# Patient Record
Sex: MALE | Race: BLACK OR AFRICAN AMERICAN | Employment: OTHER | URBAN - METROPOLITAN AREA
[De-identification: names, ages, dates, MRNs, and addresses within clinical notes are randomized per-mention and may not be internally consistent; named-entity substitution may affect disease eponyms.]

---

## 2017-02-08 PROCEDURE — 86255 FLUORESCENT ANTIBODY SCREEN: CPT | Performed by: OTHER

## 2017-02-08 PROCEDURE — 82607 VITAMIN B-12: CPT | Performed by: OTHER

## 2017-02-08 PROCEDURE — 84238 ASSAY NONENDOCRINE RECEPTOR: CPT | Performed by: OTHER

## 2017-02-08 PROCEDURE — 83516 IMMUNOASSAY NONANTIBODY: CPT | Performed by: OTHER

## 2017-02-08 PROCEDURE — 86038 ANTINUCLEAR ANTIBODIES: CPT | Performed by: OTHER

## 2017-02-08 PROCEDURE — 83519 RIA NONANTIBODY: CPT | Performed by: OTHER

## 2017-02-08 PROCEDURE — 82746 ASSAY OF FOLIC ACID SERUM: CPT | Performed by: OTHER

## 2017-02-08 PROCEDURE — 82085 ASSAY OF ALDOLASE: CPT | Performed by: OTHER

## 2017-05-02 PROBLEM — M65.331 TRIGGER FINGER, RIGHT MIDDLE FINGER: Status: ACTIVE | Noted: 2017-05-02

## 2017-07-25 ENCOUNTER — HOSPITAL ENCOUNTER (EMERGENCY)
Facility: HOSPITAL | Age: 62
Discharge: HOME | End: 2017-07-25
Attending: EMERGENCY MEDICINE
Payer: MEDICARE

## 2017-07-25 ENCOUNTER — APPOINTMENT (OUTPATIENT)
Dept: ULTRASOUND IMAGING | Facility: HOSPITAL | Age: 62
End: 2017-07-25
Attending: EMERGENCY MEDICINE
Payer: MEDICARE

## 2017-07-25 ENCOUNTER — APPOINTMENT (OUTPATIENT)
Dept: NUCLEAR MEDICINE | Facility: HOSPITAL | Age: 62
End: 2017-07-25
Attending: EMERGENCY MEDICINE
Payer: MEDICARE

## 2017-07-25 ENCOUNTER — APPOINTMENT (OUTPATIENT)
Dept: GENERAL RADIOLOGY | Facility: HOSPITAL | Age: 62
End: 2017-07-25
Attending: EMERGENCY MEDICINE
Payer: MEDICARE

## 2017-07-25 VITALS
RESPIRATION RATE: 18 BRPM | HEART RATE: 60 BPM | BODY MASS INDEX: 23.82 KG/M2 | HEIGHT: 65 IN | WEIGHT: 143 LBS | DIASTOLIC BLOOD PRESSURE: 52 MMHG | SYSTOLIC BLOOD PRESSURE: 146 MMHG | OXYGEN SATURATION: 100 % | TEMPERATURE: 97 F

## 2017-07-25 DIAGNOSIS — R20.2 ARM PARESTHESIA, LEFT: ICD-10-CM

## 2017-07-25 DIAGNOSIS — N28.9 RENAL INSUFFICIENCY: ICD-10-CM

## 2017-07-25 DIAGNOSIS — E86.0 DEHYDRATION: Primary | ICD-10-CM

## 2017-07-25 LAB
ANION GAP SERPL CALC-SCNC: 11 MMOL/L (ref 0–18)
BASOPHILS # BLD: 0 K/UL (ref 0–0.2)
BASOPHILS NFR BLD: 1 %
BNP SERPL-MCNC: 20 PG/ML (ref 0–100)
BUN SERPL-MCNC: 31 MG/DL (ref 8–20)
BUN/CREAT SERPL: 13 (ref 10–20)
CALCIUM SERPL-MCNC: 9.1 MG/DL (ref 8.5–10.5)
CHLORIDE SERPL-SCNC: 105 MMOL/L (ref 95–110)
CO2 SERPL-SCNC: 17 MMOL/L (ref 22–32)
CREAT SERPL-MCNC: 2.38 MG/DL (ref 0.5–1.5)
EOSINOPHIL # BLD: 0.1 K/UL (ref 0–0.7)
EOSINOPHIL NFR BLD: 1 %
ERYTHROCYTE [DISTWIDTH] IN BLOOD BY AUTOMATED COUNT: 18.6 % (ref 11–15)
GLUCOSE SERPL-MCNC: 100 MG/DL (ref 70–99)
HCT VFR BLD AUTO: 35.4 % (ref 41–52)
HGB BLD-MCNC: 11.3 G/DL (ref 13.5–17.5)
LYMPHOCYTES # BLD: 1.3 K/UL (ref 1–4)
LYMPHOCYTES NFR BLD: 18 %
MCH RBC QN AUTO: 24.3 PG (ref 27–32)
MCHC RBC AUTO-ENTMCNC: 31.9 G/DL (ref 32–37)
MCV RBC AUTO: 76.2 FL (ref 80–100)
MONOCYTES # BLD: 0.6 K/UL (ref 0–1)
MONOCYTES NFR BLD: 9 %
NEUTROPHILS # BLD AUTO: 5.2 K/UL (ref 1.8–7.7)
NEUTROPHILS NFR BLD: 71 %
OSMOLALITY UR CALC.SUM OF ELEC: 283 MOSM/KG (ref 275–295)
PLATELET # BLD AUTO: 305 K/UL (ref 140–400)
PMV BLD AUTO: 8.5 FL (ref 7.4–10.3)
POTASSIUM SERPL-SCNC: 4 MMOL/L (ref 3.3–5.1)
RBC # BLD AUTO: 4.65 M/UL (ref 4.5–5.9)
SODIUM SERPL-SCNC: 133 MMOL/L (ref 136–144)
TROPONIN I SERPL-MCNC: 0.01 NG/ML (ref ?–0.03)
WBC # BLD AUTO: 7.3 K/UL (ref 4–11)

## 2017-07-25 PROCEDURE — 83880 ASSAY OF NATRIURETIC PEPTIDE: CPT | Performed by: EMERGENCY MEDICINE

## 2017-07-25 PROCEDURE — 71010 XR CHEST AP PORTABLE  (CPT=71010): CPT | Performed by: EMERGENCY MEDICINE

## 2017-07-25 PROCEDURE — 93971 EXTREMITY STUDY: CPT | Performed by: EMERGENCY MEDICINE

## 2017-07-25 PROCEDURE — 93010 ELECTROCARDIOGRAM REPORT: CPT | Performed by: EMERGENCY MEDICINE

## 2017-07-25 PROCEDURE — 99285 EMERGENCY DEPT VISIT HI MDM: CPT

## 2017-07-25 PROCEDURE — 96361 HYDRATE IV INFUSION ADD-ON: CPT

## 2017-07-25 PROCEDURE — 93005 ELECTROCARDIOGRAM TRACING: CPT

## 2017-07-25 PROCEDURE — 96374 THER/PROPH/DIAG INJ IV PUSH: CPT

## 2017-07-25 PROCEDURE — 80048 BASIC METABOLIC PNL TOTAL CA: CPT | Performed by: EMERGENCY MEDICINE

## 2017-07-25 PROCEDURE — 85025 COMPLETE CBC W/AUTO DIFF WBC: CPT | Performed by: EMERGENCY MEDICINE

## 2017-07-25 PROCEDURE — 78582 LUNG VENTILAT&PERFUS IMAGING: CPT | Performed by: EMERGENCY MEDICINE

## 2017-07-25 PROCEDURE — 84484 ASSAY OF TROPONIN QUANT: CPT | Performed by: EMERGENCY MEDICINE

## 2017-07-25 RX ORDER — MORPHINE SULFATE 4 MG/ML
4 INJECTION, SOLUTION INTRAMUSCULAR; INTRAVENOUS ONCE
Status: COMPLETED | OUTPATIENT
Start: 2017-07-25 | End: 2017-07-25

## 2017-07-25 NOTE — ED PROVIDER NOTES
Patient Seen in: [facility] Emergency Department    History   Patient presents with:  Dyspnea STAN SOB (respiratory)    Stated Complaint: sob    HPI    62year old male with past medical history of CAD s/p stent, high cholesterol, history of spine 10/14/15: OTHER SURGICAL HISTORY Right      Comment: right arthrotomy, debridement, synovectomy of                the joints w/biopsy right index and long finger               MP joint  10/14/2015: PATIENT DOCUMENTED NOT TO HAVE EXPERIENCED ANY* Right Bisoprolol Fumarate (ZEBETA) 5 MG Oral Tab,  Take 5 mg by mouth daily. Levothyroxine Sodium (SYNTHROID, LEVOTHROID) 25 MCG Oral Tab,  Take 25 mcg by mouth before breakfast.   clopidogrel (PLAVIX) 75 MG Oral Tab,  Take 75 mg by mouth daily.    FLUTICASONE Abdominal: Soft. He exhibits no distension. There is no guarding. Musculoskeletal: Normal range of motion. Left upper arm: He exhibits tenderness and swelling (mild compared to right, compartments soft). He exhibits no edema and no deformity. Rate: 62  Rhythm: Sinus Rhythm  Reading: NSR           ============================================================  ED Course  ------------------------------------------------------------  MDM     Pulse Ox: 100%, Normal, RA    Cardiac Monitor: Pulse Readi

## 2017-07-25 NOTE — ED INITIAL ASSESSMENT (HPI)
Patient c/o sob pta, July 6th was here for a gastric stent her patient and a left arm blood clot, contunues to have numbness to the left arm, denies chest pain or recent sickness

## 2017-09-13 PROBLEM — G90.512 COMPLEX REGIONAL PAIN SYNDROME TYPE 1 OF LEFT UPPER EXTREMITY: Status: ACTIVE | Noted: 2017-09-13

## 2017-09-29 PROBLEM — M79.602 LEFT ARM PAIN: Status: ACTIVE | Noted: 2017-09-29

## 2018-03-20 ENCOUNTER — APPOINTMENT (OUTPATIENT)
Dept: OCCUPATIONAL MEDICINE | Facility: HOSPITAL | Age: 63
End: 2018-03-20
Attending: PAIN MEDICINE
Payer: MEDICARE

## 2018-03-22 ENCOUNTER — APPOINTMENT (OUTPATIENT)
Dept: OCCUPATIONAL MEDICINE | Facility: HOSPITAL | Age: 63
End: 2018-03-22
Attending: PAIN MEDICINE
Payer: MEDICARE

## 2018-03-27 ENCOUNTER — TELEPHONE (OUTPATIENT)
Dept: OCCUPATIONAL MEDICINE | Facility: HOSPITAL | Age: 63
End: 2018-03-27

## 2018-03-29 ENCOUNTER — APPOINTMENT (OUTPATIENT)
Dept: OCCUPATIONAL MEDICINE | Facility: HOSPITAL | Age: 63
End: 2018-03-29
Attending: PAIN MEDICINE
Payer: MEDICARE

## 2018-04-02 ENCOUNTER — TELEPHONE (OUTPATIENT)
Dept: PHYSICAL THERAPY | Facility: HOSPITAL | Age: 63
End: 2018-04-02

## 2018-04-03 ENCOUNTER — APPOINTMENT (OUTPATIENT)
Dept: OCCUPATIONAL MEDICINE | Facility: HOSPITAL | Age: 63
End: 2018-04-03
Attending: PAIN MEDICINE
Payer: MEDICARE

## 2018-04-05 ENCOUNTER — TELEPHONE (OUTPATIENT)
Dept: OCCUPATIONAL MEDICINE | Facility: HOSPITAL | Age: 63
End: 2018-04-05

## 2018-04-11 ENCOUNTER — APPOINTMENT (OUTPATIENT)
Dept: OCCUPATIONAL MEDICINE | Facility: HOSPITAL | Age: 63
End: 2018-04-11
Attending: PAIN MEDICINE
Payer: MEDICARE

## 2018-04-13 ENCOUNTER — APPOINTMENT (OUTPATIENT)
Dept: OCCUPATIONAL MEDICINE | Facility: HOSPITAL | Age: 63
End: 2018-04-13
Attending: PAIN MEDICINE
Payer: MEDICARE

## 2018-04-17 ENCOUNTER — APPOINTMENT (OUTPATIENT)
Dept: OCCUPATIONAL MEDICINE | Facility: HOSPITAL | Age: 63
End: 2018-04-17
Attending: PAIN MEDICINE
Payer: MEDICARE

## 2018-04-19 ENCOUNTER — APPOINTMENT (OUTPATIENT)
Dept: OCCUPATIONAL MEDICINE | Facility: HOSPITAL | Age: 63
End: 2018-04-19
Attending: PAIN MEDICINE
Payer: MEDICARE

## 2018-04-24 ENCOUNTER — APPOINTMENT (OUTPATIENT)
Dept: OCCUPATIONAL MEDICINE | Facility: HOSPITAL | Age: 63
End: 2018-04-24
Attending: PAIN MEDICINE
Payer: MEDICARE

## 2018-04-26 ENCOUNTER — APPOINTMENT (OUTPATIENT)
Dept: OCCUPATIONAL MEDICINE | Facility: HOSPITAL | Age: 63
End: 2018-04-26
Attending: PAIN MEDICINE
Payer: MEDICARE

## 2021-09-23 ENCOUNTER — APPOINTMENT (OUTPATIENT)
Dept: CT IMAGING | Facility: HOSPITAL | Age: 66
End: 2021-09-23
Attending: EMERGENCY MEDICINE
Payer: MEDICARE

## 2021-09-23 ENCOUNTER — HOSPITAL ENCOUNTER (OUTPATIENT)
Facility: HOSPITAL | Age: 66
Setting detail: OBSERVATION
LOS: 1 days | Discharge: HOME | End: 2021-09-24
Attending: EMERGENCY MEDICINE | Admitting: HOSPITALIST
Payer: MEDICARE

## 2021-09-23 DIAGNOSIS — N17.9 AKI (ACUTE KIDNEY INJURY) (HCC): Primary | ICD-10-CM

## 2021-09-23 DIAGNOSIS — R51.9 ACUTE NONINTRACTABLE HEADACHE, UNSPECIFIED HEADACHE TYPE: ICD-10-CM

## 2021-09-23 LAB
ANION GAP SERPL CALC-SCNC: 11 MMOL/L (ref 0–18)
BASOPHILS # BLD AUTO: 0.03 X10(3) UL (ref 0–0.2)
BASOPHILS NFR BLD AUTO: 0.4 %
BUN BLD-MCNC: 49 MG/DL (ref 7–18)
BUN/CREAT SERPL: 12.9 (ref 10–20)
CALCIUM BLD-MCNC: 9 MG/DL (ref 8.5–10.1)
CHLORIDE SERPL-SCNC: 103 MMOL/L (ref 98–112)
CO2 SERPL-SCNC: 19 MMOL/L (ref 21–32)
CREAT BLD-MCNC: 3.81 MG/DL
CRP SERPL-MCNC: 0.5 MG/DL (ref ?–0.3)
DEPRECATED RDW RBC AUTO: 42.9 FL (ref 35.1–46.3)
EOSINOPHIL # BLD AUTO: 0.03 X10(3) UL (ref 0–0.7)
EOSINOPHIL NFR BLD AUTO: 0.4 %
ERYTHROCYTE [DISTWIDTH] IN BLOOD BY AUTOMATED COUNT: 15.6 % (ref 11–15)
ERYTHROCYTE [SEDIMENTATION RATE] IN BLOOD: 53 MM/HR
GLUCOSE BLD-MCNC: 152 MG/DL (ref 70–99)
HCT VFR BLD AUTO: 40.4 %
HGB BLD-MCNC: 13.2 G/DL
IMM GRANULOCYTES # BLD AUTO: 0.25 X10(3) UL (ref 0–1)
IMM GRANULOCYTES NFR BLD: 3 %
LYMPHOCYTES # BLD AUTO: 1.58 X10(3) UL (ref 1–4)
LYMPHOCYTES NFR BLD AUTO: 19 %
MCH RBC QN AUTO: 25.2 PG (ref 26–34)
MCHC RBC AUTO-ENTMCNC: 32.7 G/DL (ref 31–37)
MCV RBC AUTO: 77.1 FL
MONOCYTES # BLD AUTO: 0.26 X10(3) UL (ref 0.1–1)
MONOCYTES NFR BLD AUTO: 3.1 %
NEUTROPHILS # BLD AUTO: 6.17 X10 (3) UL (ref 1.5–7.7)
NEUTROPHILS # BLD AUTO: 6.17 X10(3) UL (ref 1.5–7.7)
NEUTROPHILS NFR BLD AUTO: 74.1 %
OSMOLALITY SERPL CALC.SUM OF ELEC: 292 MOSM/KG (ref 275–295)
PLATELET # BLD AUTO: 274 10(3)UL (ref 150–450)
POTASSIUM SERPL-SCNC: 3.9 MMOL/L (ref 3.5–5.1)
RBC # BLD AUTO: 5.24 X10(6)UL
S PYO AG THROAT QL: NEGATIVE
SARS-COV-2 RNA RESP QL NAA+PROBE: NOT DETECTED
SODIUM SERPL-SCNC: 133 MMOL/L (ref 136–145)
WBC # BLD AUTO: 8.3 X10(3) UL (ref 4–11)

## 2021-09-23 PROCEDURE — 99223 1ST HOSP IP/OBS HIGH 75: CPT | Performed by: INTERNAL MEDICINE

## 2021-09-23 PROCEDURE — 70490 CT SOFT TISSUE NECK W/O DYE: CPT | Performed by: EMERGENCY MEDICINE

## 2021-09-23 PROCEDURE — 99220 INITIAL OBSERVATION CARE,LEVL III: CPT | Performed by: HOSPITALIST

## 2021-09-23 PROCEDURE — 70450 CT HEAD/BRAIN W/O DYE: CPT | Performed by: EMERGENCY MEDICINE

## 2021-09-23 RX ORDER — METOPROLOL TARTRATE 50 MG/1
50 TABLET, FILM COATED ORAL
Status: DISCONTINUED | OUTPATIENT
Start: 2021-09-23 | End: 2021-09-24

## 2021-09-23 RX ORDER — AMLODIPINE BESYLATE 5 MG/1
5 TABLET ORAL DAILY
Status: DISCONTINUED | OUTPATIENT
Start: 2021-09-24 | End: 2021-09-24

## 2021-09-23 RX ORDER — HYDROCODONE BITARTRATE AND ACETAMINOPHEN 5; 325 MG/1; MG/1
1 TABLET ORAL EVERY 6 HOURS PRN
COMMUNITY

## 2021-09-23 RX ORDER — SODIUM CHLORIDE 9 MG/ML
INJECTION, SOLUTION INTRAVENOUS CONTINUOUS
Status: DISCONTINUED | OUTPATIENT
Start: 2021-09-23 | End: 2021-09-24

## 2021-09-23 RX ORDER — ALENDRONATE SODIUM 70 MG/1
70 TABLET ORAL WEEKLY
COMMUNITY

## 2021-09-23 RX ORDER — METOCLOPRAMIDE HYDROCHLORIDE 5 MG/ML
5 INJECTION INTRAMUSCULAR; INTRAVENOUS EVERY 8 HOURS PRN
Status: DISCONTINUED | OUTPATIENT
Start: 2021-09-23 | End: 2021-09-24

## 2021-09-23 RX ORDER — SODIUM CHLORIDE 9 MG/ML
INJECTION, SOLUTION INTRAVENOUS CONTINUOUS
Status: DISCONTINUED | OUTPATIENT
Start: 2021-09-23 | End: 2021-09-23

## 2021-09-23 RX ORDER — ACETAMINOPHEN 325 MG/1
650 TABLET ORAL EVERY 6 HOURS PRN
Status: DISCONTINUED | OUTPATIENT
Start: 2021-09-23 | End: 2021-09-24

## 2021-09-23 RX ORDER — NIFEDIPINE 30 MG/1
60 TABLET, EXTENDED RELEASE ORAL 2 TIMES DAILY
Status: DISCONTINUED | OUTPATIENT
Start: 2021-09-23 | End: 2021-09-24

## 2021-09-23 RX ORDER — DIPHENHYDRAMINE HYDROCHLORIDE 50 MG/ML
25 INJECTION INTRAMUSCULAR; INTRAVENOUS ONCE
Status: COMPLETED | OUTPATIENT
Start: 2021-09-23 | End: 2021-09-23

## 2021-09-23 RX ORDER — CLOPIDOGREL BISULFATE 75 MG/1
75 TABLET ORAL DAILY
Status: DISCONTINUED | OUTPATIENT
Start: 2021-09-23 | End: 2021-09-24

## 2021-09-23 RX ORDER — MORPHINE SULFATE 2 MG/ML
2 INJECTION, SOLUTION INTRAMUSCULAR; INTRAVENOUS EVERY 2 HOUR PRN
Status: DISCONTINUED | OUTPATIENT
Start: 2021-09-23 | End: 2021-09-24

## 2021-09-23 RX ORDER — ONDANSETRON 2 MG/ML
4 INJECTION INTRAMUSCULAR; INTRAVENOUS EVERY 6 HOURS PRN
Status: DISCONTINUED | OUTPATIENT
Start: 2021-09-23 | End: 2021-09-24

## 2021-09-23 RX ORDER — METOCLOPRAMIDE HYDROCHLORIDE 5 MG/ML
10 INJECTION INTRAMUSCULAR; INTRAVENOUS ONCE
Status: COMPLETED | OUTPATIENT
Start: 2021-09-23 | End: 2021-09-23

## 2021-09-23 RX ORDER — PREDNISONE 20 MG/1
20 TABLET ORAL 2 TIMES DAILY
COMMUNITY

## 2021-09-23 RX ORDER — PREGABALIN 75 MG/1
75 CAPSULE ORAL DAILY
Status: DISCONTINUED | OUTPATIENT
Start: 2021-09-23 | End: 2021-09-24

## 2021-09-23 RX ORDER — PANTOPRAZOLE SODIUM 20 MG/1
20 TABLET, DELAYED RELEASE ORAL
Status: DISCONTINUED | OUTPATIENT
Start: 2021-09-24 | End: 2021-09-24

## 2021-09-23 RX ORDER — LEVOTHYROXINE SODIUM 0.03 MG/1
25 TABLET ORAL
Status: DISCONTINUED | OUTPATIENT
Start: 2021-09-23 | End: 2021-09-24

## 2021-09-23 RX ORDER — CYCLOBENZAPRINE HCL 10 MG
10 TABLET ORAL 3 TIMES DAILY PRN
COMMUNITY

## 2021-09-23 RX ORDER — ISOSORBIDE DINITRATE 30 MG/1
30 TABLET ORAL
COMMUNITY

## 2021-09-23 RX ORDER — HYDROCODONE BITARTRATE AND ACETAMINOPHEN 5; 325 MG/1; MG/1
1 TABLET ORAL EVERY 6 HOURS PRN
Status: DISCONTINUED | OUTPATIENT
Start: 2021-09-23 | End: 2021-09-24

## 2021-09-23 RX ORDER — AMLODIPINE BESYLATE 5 MG/1
5 TABLET ORAL DAILY
COMMUNITY

## 2021-09-23 RX ORDER — MORPHINE SULFATE 2 MG/ML
1 INJECTION, SOLUTION INTRAMUSCULAR; INTRAVENOUS EVERY 2 HOUR PRN
Status: DISCONTINUED | OUTPATIENT
Start: 2021-09-23 | End: 2021-09-24

## 2021-09-23 RX ORDER — MORPHINE SULFATE 4 MG/ML
4 INJECTION, SOLUTION INTRAMUSCULAR; INTRAVENOUS EVERY 2 HOUR PRN
Status: DISCONTINUED | OUTPATIENT
Start: 2021-09-23 | End: 2021-09-24

## 2021-09-23 RX ORDER — CYCLOBENZAPRINE HCL 10 MG
10 TABLET ORAL 3 TIMES DAILY PRN
Status: DISCONTINUED | OUTPATIENT
Start: 2021-09-23 | End: 2021-09-24

## 2021-09-23 RX ORDER — MORPHINE SULFATE 4 MG/ML
4 INJECTION, SOLUTION INTRAMUSCULAR; INTRAVENOUS ONCE
Status: COMPLETED | OUTPATIENT
Start: 2021-09-23 | End: 2021-09-23

## 2021-09-23 RX ORDER — NIFEDIPINE 60 MG/1
60 TABLET, EXTENDED RELEASE ORAL DAILY
Status: ON HOLD | COMMUNITY
End: 2021-09-23

## 2021-09-23 RX ORDER — PREDNISONE 20 MG/1
20 TABLET ORAL 2 TIMES DAILY
Status: DISCONTINUED | OUTPATIENT
Start: 2021-09-23 | End: 2021-09-24

## 2021-09-23 NOTE — H&P
[facility]    PATIENT'S NAME: Celestino Morales PHYSICIAN: Mallika Bird MD   PATIENT ACCOUNT#:   [de-identified]    LOCATION:  [place] [address]  MEDICAL RECORD #:   [id number]       YOB: 1955  ADMISSION DATE: SURGICAL HISTORY:  Lumbar laminectomy and right temporal artery biopsy. MEDICATIONS:  Please see medication reconciliation list.    ALLERGIES:  Penicillin and shrimp. He has side effects to aspirin. FAMILY HISTORY:  Mother had hypertension.   Father intake and acute on chronic renal insufficiency. 3.   Hypertension. 4.   History of temporal arteritis. Inflammatory markers are elevated but not impressively. No visual changes from baseline.     PLAN:  The patient will be admitted to [facility] medical f

## 2021-09-23 NOTE — ED INITIAL ASSESSMENT (HPI)
Patient complains of r side temple pain which migrates to his neck, hx of temporal arthritis, states this began yesterday

## 2021-09-23 NOTE — CONSULTS
[facility]    Report of Consultation    Jaciel Silva.  Patient Status:  Emergency    1955 MRN [id number]   Location [address] Attending Vivek Durbin MD   Hosp Day # 0 PCP Lori Garcia MD JOINT INTERPHALANGEAL;  Surgeon: Maynor Ramirez MD;  Location: [place] [address]   • OTHER SURGICAL HISTORY  2000,2002, 2004, 2006    back surgery    • OTHER SURGICAL HISTORY  1982    \"ulcer surgery\"   • OTHER SURGICAL HISTORY Right 10/14/15    right arthr respiratory effort  CV: Heart with regular rate and rhythm, no edema  Abd: Abdomen soft, nontender, nondistended, no organomegaly, bowel sounds present  Skin: no symptoms reported  Psych: alert and oriented        Results:     Laboratory Data:  Recent Labs Dictated by (CST): Ashanti Tate MD on 9/23/2021 at 1:40 PM     Finalized by (CST): Ashanti Tate MD on 9/23/2021 at 1:51 PM                    Impression/Receommendations:       1 - PIERCE  Possible volume depletion  Agree with gentle hydration    2 - HT

## 2021-09-23 NOTE — PROGRESS NOTES
[facility] Pharmacy Note:  Renal Dose Adjustment for Metoclopramide (REGLAN)    Maximo Rothman. has been prescribed Metoclopramide (REGLAN) 10 mg every 8 hours as needed for nausea/vomiting,.     Estimated Creatinine Clearance: 16.8 mL/min (A) (based on SCr

## 2021-09-23 NOTE — ED PROVIDER NOTES
Patient Seen in: [facility] AND [facility] Emergency Department      History   Patient presents with:  Pain    Stated Complaint: pain on right side of head to neck    Subjective:   HPI    29-year-old male with history of hypertension, CAD, transarteritis, curren finger MP joint   • PATIENT DOCUMENTED NOT TO HAVE EXPERIENCED ANY OF THE FOLLOWING EVENTS Right 10/14/2015    Procedure: ARTHROTOMY OF FINGER JOINT INTERPHALANGEAL;  Surgeon: Garrett Fang MD;  Location: [facility]   • PATIENT WITH PREOPERATIVE Moira Lopezack Cardiovascular:      Rate and Rhythm: Regular rhythm. Pulmonary:      Effort: Pulmonary effort is normal.   Abdominal:      Palpations: Abdomen is soft. Musculoskeletal:         General: No deformity. Cervical back: Neck supple.    Skin:     Gene 17.5 g/dL    HCT 40.4 39.0 - 53.0 %    MCV 77.1 (L) 80.0 - 100.0 fL    MCH 25.2 (L) 26.0 - 34.0 pg    MCHC 32.7 31.0 - 37.0 g/dL    RDW-SD 42.9 35.1 - 46.3 fL    RDW 15.6 (H) 11.0 - 15.0 %    .0 150.0 - 450.0 10(3)uL    Neutrophil Absolute Prelim 6. inferior parotid sialoliths. 3. Scant aerated secretions in the right sphenoid sinus. 4. Mild-to-moderate bilateral carotid bifurcation atherosclerosis. 5. Partially imaged presumed stimulator electrodes in the lower cervical spine.  6. Subpleural 5 mm and obtaining the patient's history, performing the physical exam and reviewing the diagnostics, multiple initial diagnoses were considered based on the presenting problem including temporal arteritis.                            Disposition and Plan     [facility]

## 2021-09-23 NOTE — ED QUICK NOTES
Orders for admission, patient is aware of plan and ready to go upstairs. Any questions, please call ED RN Qing Justin at extension [phone].    Type of COVID test sent:Rapid  COVID Suspicion level: Low    Titratable drug(s) infusing:  Rate:    LOC at time of transpor

## 2021-09-23 NOTE — CONSULTS
[facility] - [facility]    Report of Consultation    Kendy Sagastume.  Patient Status:  Emergency    1955 MRN [id number]   Location [address] Attending Lucia Roman MD   Hosp Day # 0 PCP Dev Patel MD peripheral vision in both eyes due to this diagnosis. Blood work showed elevated ESR 53 and CRP of 0.5. His creatinine was also elevated 3.81. Creatinine in July it was 1.7. CT of the brain was done showing chronic white matter ischemia.   CT of the n PREOPERATIVE ORDER FOR IV ANTIBIOTIC SURGICAL SITE INFECT Right 10/14/2015    Procedure: ARTHROTOMY OF FINGER JOINT INTERPHALANGEAL;  Surgeon: Lopez Carl MD;  Location: [facility]       Family History  Family History   Problem Relation Age of Onse grossly. 5/5 strength throughout in both upper and lower extremities, sensation intact.   PSYCH: normal mood      Results:     Laboratory Data:  Lab Results   Component Value Date    WBC 8.3 09/23/2021    HGB 13.2 09/23/2021    HCT 40.4 09/23/2021    PLT 27 comparison with any available chest CT imaging to assess stability. If unavailable, 6 month follow-up chest CT is suggested.  7. Lesser incidental findings as above.   elm-remote  Dictated by (CST): Jennifer Forte MD on 9/23/2021 at 1:40 PM     Finalized

## 2021-09-23 NOTE — PLAN OF CARE
Pt admitted from ED. Med rec complete with wife present at bedside. On clear liquid diet with IV fluids infusing. All safety measures in place. Pt aware to call before getting up. Continue to monitor.      Problem: PAIN - ADULT  Goal: Verbalizes/displays ad activity based on assessment  - Modify environment to reduce risk of injury  - Provide assistive devices as appropriate  - Consider OT/PT consult to assist with strengthening/mobility  - Encourage toileting schedule  Outcome: Progressing     Problem: [facility]

## 2021-09-24 VITALS
TEMPERATURE: 98 F | HEART RATE: 74 BPM | SYSTOLIC BLOOD PRESSURE: 150 MMHG | WEIGHT: 156.5 LBS | BODY MASS INDEX: 26.08 KG/M2 | RESPIRATION RATE: 20 BRPM | HEIGHT: 65 IN | DIASTOLIC BLOOD PRESSURE: 60 MMHG | OXYGEN SATURATION: 95 %

## 2021-09-24 LAB
ALBUMIN SERPL-MCNC: 3.1 G/DL (ref 3.4–5)
ANION GAP SERPL CALC-SCNC: 8 MMOL/L (ref 0–18)
BASOPHILS # BLD AUTO: 0.01 X10(3) UL (ref 0–0.2)
BASOPHILS NFR BLD AUTO: 0.2 %
BUN BLD-MCNC: 28 MG/DL (ref 7–18)
BUN/CREAT SERPL: 14 (ref 10–20)
CALCIUM BLD-MCNC: 8.4 MG/DL (ref 8.5–10.1)
CHLORIDE SERPL-SCNC: 107 MMOL/L (ref 98–112)
CO2 SERPL-SCNC: 22 MMOL/L (ref 21–32)
CREAT BLD-MCNC: 2 MG/DL
DEPRECATED RDW RBC AUTO: 44.3 FL (ref 35.1–46.3)
EOSINOPHIL # BLD AUTO: 0 X10(3) UL (ref 0–0.7)
EOSINOPHIL NFR BLD AUTO: 0 %
ERYTHROCYTE [DISTWIDTH] IN BLOOD BY AUTOMATED COUNT: 15.5 % (ref 11–15)
GLUCOSE BLD-MCNC: 126 MG/DL (ref 70–99)
HCT VFR BLD AUTO: 37.5 %
HGB BLD-MCNC: 12.1 G/DL
IMM GRANULOCYTES # BLD AUTO: 0.08 X10(3) UL (ref 0–1)
IMM GRANULOCYTES NFR BLD: 1.3 %
LYMPHOCYTES # BLD AUTO: 1.38 X10(3) UL (ref 1–4)
LYMPHOCYTES NFR BLD AUTO: 21.6 %
MCH RBC QN AUTO: 25.4 PG (ref 26–34)
MCHC RBC AUTO-ENTMCNC: 32.3 G/DL (ref 31–37)
MCV RBC AUTO: 78.8 FL
MONOCYTES # BLD AUTO: 0.29 X10(3) UL (ref 0.1–1)
MONOCYTES NFR BLD AUTO: 4.5 %
NEUTROPHILS # BLD AUTO: 4.63 X10 (3) UL (ref 1.5–7.7)
NEUTROPHILS # BLD AUTO: 4.63 X10(3) UL (ref 1.5–7.7)
NEUTROPHILS NFR BLD AUTO: 72.4 %
OSMOLALITY SERPL CALC.SUM OF ELEC: 291 MOSM/KG (ref 275–295)
PHOSPHATE SERPL-MCNC: 2.5 MG/DL (ref 2.5–4.9)
PLATELET # BLD AUTO: 241 10(3)UL (ref 150–450)
POTASSIUM SERPL-SCNC: 3.5 MMOL/L (ref 3.5–5.1)
RBC # BLD AUTO: 4.76 X10(6)UL
SODIUM SERPL-SCNC: 137 MMOL/L (ref 136–145)
WBC # BLD AUTO: 6.4 X10(3) UL (ref 4–11)

## 2021-09-24 PROCEDURE — 99232 SBSQ HOSP IP/OBS MODERATE 35: CPT | Performed by: INTERNAL MEDICINE

## 2021-09-24 PROCEDURE — 99217 OBSERVATION CARE DISCHARGE: CPT | Performed by: HOSPITALIST

## 2021-09-24 NOTE — PROGRESS NOTES
[facility]    Progress Note    Sol Days.  Patient Status:  Inpatient    1955 MRN [id number]   [facility] 5SW/SE Attending Willard Shah, [address] [facility] Day # 1 PCP Josiane Fernandes MD       Subjective:   Manasa Dorantes hemorrhage. No acute intracranial CT abnormalities. 3. No intracranial mass lesion, mass-effect or midline shift.     Dictated by (CST): Hi Lubin MD on 9/23/2021 at 12:52 PM     Finalized by (CST): Hi Lubin MD on 9/23/2021 at 12:56 PM

## 2021-09-24 NOTE — CM/SW NOTE
Patient failed inpatient criteria. Second level of review completed and supports observation. UR committee in agreement. Discussed with Dr. Henry Rainey who approves observation status. MOON  notice explained and  provided  to the patient .  Copy placed in [place]

## 2021-09-24 NOTE — PROGRESS NOTES
Discharge RN Summary: Patient has discharge order in. Patient to discharge home with family. IV removed by this Rn. Understands to follow up with PCP in 1 week. Patient understands no new meds.        Scripts sent with pt: none  Electronically sent prescri

## 2021-09-24 NOTE — PROGRESS NOTES
[place] [facility]    Progress Note    Jessee Reed.  Patient Status:  Inpatient    1955 MRN [id number]   [facility] 5SW/SE Attending Tj Goodson, [address] [facility] Day # 1 PCP Vern Connell MD     Subjective:     Pa Dictated by (CST): Jenni Cardozo MD on 9/23/2021 at 12:52 PM     Finalized by (CST): Jenni Cardozo MD on 9/23/2021 at 12:56 PM          CT SOFT TISSUE OF NECK (CPT=70490)    Result Date: 9/23/2021  CONCLUSION:  1.  No acute noncontrast neck CT f negative     PIERCE on CKD stage 3  - nephrology following , Cr improved to 2.00     Patient can follow-up with a dermatologist outpatient.   Will sign off    Maggi Soto MD  9/24/2021

## 2021-09-30 NOTE — DISCHARGE SUMMARY
[place] [facility]    Discharge Summary    Usman Hill.  Patient Status:  Observation    1955 MRN [id number]   [facility] 5SW/SE Attending No att. providers found   [facility] Day # 1 PCP Tanya Mixon MD     Date of no cyanosis or edema  Psychiatric: calm        History of Present Illness:       HISTORY OF PRESENT ILLNESS:  The patient is a 71-year-old [facility] American male with history of chronic kidney disease and hypertension and also history of temporal arteritis bisoprolol 5 MG Tabs  Commonly known as: ZEBETA      Take 5 mg by mouth 2 (two) times a day. Refills: 0     clopidogrel 75 MG Tabs  Commonly known as: PLAVIX      Take 75 mg by mouth daily. Refills: 0     CoQ10 100 MG Caps      Take by mouth.    Refil

## 2021-10-09 ENCOUNTER — HOSPITAL ENCOUNTER (EMERGENCY)
Facility: HOSPITAL | Age: 66
Discharge: HOME | End: 2021-10-09
Attending: EMERGENCY MEDICINE
Payer: MEDICARE

## 2021-10-09 ENCOUNTER — APPOINTMENT (OUTPATIENT)
Dept: GENERAL RADIOLOGY | Facility: HOSPITAL | Age: 66
End: 2021-10-09
Attending: EMERGENCY MEDICINE
Payer: MEDICARE

## 2021-10-09 VITALS
SYSTOLIC BLOOD PRESSURE: 153 MMHG | HEIGHT: 65 IN | BODY MASS INDEX: 27.66 KG/M2 | RESPIRATION RATE: 25 BRPM | WEIGHT: 166 LBS | TEMPERATURE: 97 F | HEART RATE: 68 BPM | OXYGEN SATURATION: 96 % | DIASTOLIC BLOOD PRESSURE: 71 MMHG

## 2021-10-09 DIAGNOSIS — A08.4 VIRAL GASTROENTERITIS: Primary | ICD-10-CM

## 2021-10-09 DIAGNOSIS — N17.9 AKI (ACUTE KIDNEY INJURY) (HCC): ICD-10-CM

## 2021-10-09 PROCEDURE — 96361 HYDRATE IV INFUSION ADD-ON: CPT

## 2021-10-09 PROCEDURE — 96375 TX/PRO/DX INJ NEW DRUG ADDON: CPT

## 2021-10-09 PROCEDURE — 80048 BASIC METABOLIC PNL TOTAL CA: CPT | Performed by: EMERGENCY MEDICINE

## 2021-10-09 PROCEDURE — 80076 HEPATIC FUNCTION PANEL: CPT | Performed by: EMERGENCY MEDICINE

## 2021-10-09 PROCEDURE — C9113 INJ PANTOPRAZOLE SODIUM, VIA: HCPCS | Performed by: EMERGENCY MEDICINE

## 2021-10-09 PROCEDURE — 85007 BL SMEAR W/DIFF WBC COUNT: CPT | Performed by: EMERGENCY MEDICINE

## 2021-10-09 PROCEDURE — 83690 ASSAY OF LIPASE: CPT | Performed by: EMERGENCY MEDICINE

## 2021-10-09 PROCEDURE — 99284 EMERGENCY DEPT VISIT MOD MDM: CPT

## 2021-10-09 PROCEDURE — 85027 COMPLETE CBC AUTOMATED: CPT | Performed by: EMERGENCY MEDICINE

## 2021-10-09 PROCEDURE — 85025 COMPLETE CBC W/AUTO DIFF WBC: CPT | Performed by: EMERGENCY MEDICINE

## 2021-10-09 PROCEDURE — 71045 X-RAY EXAM CHEST 1 VIEW: CPT | Performed by: EMERGENCY MEDICINE

## 2021-10-09 PROCEDURE — 96374 THER/PROPH/DIAG INJ IV PUSH: CPT

## 2021-10-09 RX ORDER — ONDANSETRON 2 MG/ML
4 INJECTION INTRAMUSCULAR; INTRAVENOUS ONCE
Status: COMPLETED | OUTPATIENT
Start: 2021-10-09 | End: 2021-10-09

## 2021-10-09 RX ORDER — ONDANSETRON 4 MG/1
4 TABLET, ORALLY DISINTEGRATING ORAL EVERY 4 HOURS PRN
Qty: 10 TABLET | Refills: 0 | Status: SHIPPED | OUTPATIENT
Start: 2021-10-09 | End: 2021-10-16

## 2021-10-09 RX ORDER — ONDANSETRON 2 MG/ML
INJECTION INTRAMUSCULAR; INTRAVENOUS
Status: COMPLETED
Start: 2021-10-09 | End: 2021-10-09

## 2021-10-09 NOTE — ED INITIAL ASSESSMENT (HPI)
Patient aox3 to ed via private vheicle patient co of vomiting x 2 days with some diarrhea denies abd pain

## 2021-10-09 NOTE — ED PROVIDER NOTES
Patient Seen in: [facility] AND [facility] Emergency Department      History   Patient presents with:  Nausea/Vomiting/Diarrhea    Stated Complaint: vomiting     Subjective:   HPI    79-year-old male with past medical history significant for high blood pressure, EVENTS Right 10/14/2015    Procedure: ARTHROTOMY OF FINGER JOINT INTERPHALANGEAL;  Surgeon: Houston Marquis MD;  Location: [facility]   • PATIENT WITH PREOPERATIVE ORDER FOR IV ANTIBIOTIC SURGICAL SITE INFECT Right 10/14/2015    Procedure: ARTHROTOMY O components:       Result Value    Chloride 113 (*)     CO2 14.0 (*)     BUN 41 (*)     Creatinine 2.73 (*)     Calculated Osmolality 303 (*)     GFR, Non- 23 (*)     GFR, -American 27 (*)     All other components within normal limits I believe he likely just has gastroenteritis causing his symptoms.     Critical care time exclusive of procedure time spent on this patient was 31 min for taking history from patient examining patient, medical decision-making, reviewing lab work and radiol

## 2021-10-11 ENCOUNTER — HOSPITAL ENCOUNTER (EMERGENCY)
Facility: HOSPITAL | Age: 66
Discharge: HOME | End: 2021-10-11
Attending: EMERGENCY MEDICINE
Payer: MEDICARE

## 2021-10-11 ENCOUNTER — APPOINTMENT (OUTPATIENT)
Dept: CT IMAGING | Facility: HOSPITAL | Age: 66
End: 2021-10-11
Attending: EMERGENCY MEDICINE
Payer: MEDICARE

## 2021-10-11 VITALS
WEIGHT: 166 LBS | HEIGHT: 65 IN | TEMPERATURE: 97 F | DIASTOLIC BLOOD PRESSURE: 57 MMHG | HEART RATE: 60 BPM | OXYGEN SATURATION: 98 % | RESPIRATION RATE: 23 BRPM | SYSTOLIC BLOOD PRESSURE: 149 MMHG | BODY MASS INDEX: 27.66 KG/M2

## 2021-10-11 DIAGNOSIS — R05.3 CHRONIC COUGH: Primary | ICD-10-CM

## 2021-10-11 DIAGNOSIS — R10.9 ABDOMINAL PAIN OF UNKNOWN ETIOLOGY: ICD-10-CM

## 2021-10-11 DIAGNOSIS — R11.2 NON-INTRACTABLE VOMITING WITH NAUSEA, UNSPECIFIED VOMITING TYPE: ICD-10-CM

## 2021-10-11 PROCEDURE — 96375 TX/PRO/DX INJ NEW DRUG ADDON: CPT

## 2021-10-11 PROCEDURE — 96361 HYDRATE IV INFUSION ADD-ON: CPT

## 2021-10-11 PROCEDURE — 96374 THER/PROPH/DIAG INJ IV PUSH: CPT

## 2021-10-11 PROCEDURE — 74177 CT ABD & PELVIS W/CONTRAST: CPT | Performed by: EMERGENCY MEDICINE

## 2021-10-11 PROCEDURE — 84484 ASSAY OF TROPONIN QUANT: CPT | Performed by: EMERGENCY MEDICINE

## 2021-10-11 PROCEDURE — 84145 PROCALCITONIN (PCT): CPT | Performed by: EMERGENCY MEDICINE

## 2021-10-11 PROCEDURE — 93005 ELECTROCARDIOGRAM TRACING: CPT

## 2021-10-11 PROCEDURE — 71260 CT THORAX DX C+: CPT | Performed by: EMERGENCY MEDICINE

## 2021-10-11 PROCEDURE — 85379 FIBRIN DEGRADATION QUANT: CPT | Performed by: EMERGENCY MEDICINE

## 2021-10-11 PROCEDURE — 93010 ELECTROCARDIOGRAM REPORT: CPT | Performed by: EMERGENCY MEDICINE

## 2021-10-11 PROCEDURE — 80048 BASIC METABOLIC PNL TOTAL CA: CPT | Performed by: EMERGENCY MEDICINE

## 2021-10-11 PROCEDURE — 81001 URINALYSIS AUTO W/SCOPE: CPT | Performed by: EMERGENCY MEDICINE

## 2021-10-11 PROCEDURE — 83880 ASSAY OF NATRIURETIC PEPTIDE: CPT | Performed by: EMERGENCY MEDICINE

## 2021-10-11 PROCEDURE — 85025 COMPLETE CBC W/AUTO DIFF WBC: CPT | Performed by: EMERGENCY MEDICINE

## 2021-10-11 PROCEDURE — 83690 ASSAY OF LIPASE: CPT | Performed by: EMERGENCY MEDICINE

## 2021-10-11 PROCEDURE — 99285 EMERGENCY DEPT VISIT HI MDM: CPT

## 2021-10-11 PROCEDURE — 80076 HEPATIC FUNCTION PANEL: CPT | Performed by: EMERGENCY MEDICINE

## 2021-10-11 PROCEDURE — C9113 INJ PANTOPRAZOLE SODIUM, VIA: HCPCS | Performed by: EMERGENCY MEDICINE

## 2021-10-11 RX ORDER — ONDANSETRON 4 MG/1
4 TABLET, ORALLY DISINTEGRATING ORAL EVERY 8 HOURS PRN
Qty: 6 TABLET | Refills: 0 | Status: SHIPPED | OUTPATIENT
Start: 2021-10-11

## 2021-10-11 RX ORDER — ALBUTEROL SULFATE 90 UG/1
2 AEROSOL, METERED RESPIRATORY (INHALATION) EVERY 4 HOURS PRN
Qty: 18 G | Refills: 0 | Status: SHIPPED | OUTPATIENT
Start: 2021-10-11 | End: 2021-11-10

## 2021-10-11 RX ORDER — PREDNISONE 20 MG/1
40 TABLET ORAL DAILY
Qty: 10 TABLET | Refills: 0 | Status: SHIPPED | OUTPATIENT
Start: 2021-10-11 | End: 2021-10-11

## 2021-10-11 RX ORDER — MORPHINE SULFATE 4 MG/ML
4 INJECTION, SOLUTION INTRAMUSCULAR; INTRAVENOUS ONCE
Status: COMPLETED | OUTPATIENT
Start: 2021-10-11 | End: 2021-10-11

## 2021-10-11 NOTE — ED PROVIDER NOTES
Patient Seen in: [facility] AND [facility] Emergency Department      History   Patient presents with:  Cough/URI  Abdominal Pain    Stated Complaint: cough, sob     Subjective:   HPI    72 y/o male here 2 days ago w/ vomiting which has continued also w/ cough fo Never Smoker      Smokeless tobacco: Never Used    Vaping Use      Vaping Use: Never used    Alcohol use: No    Drug use: No             Review of Systems    Positive for stated complaint: cough, sob   Other systems are as noted in HPI.   Constitutional and Abnormal; Notable for the following components:    AST 65 (*)      (*)     Alkaline Phosphatase 34 (*)     Albumin 3.2 (*)     All other components within normal limits   URINALYSIS WITH CULTURE REFLEX - Abnormal; Notable for the following component History temporal arteritis. TECHNIQUE: CT images were obtained without contrast material.  Automated exposure control for dose reduction was used.   Dose information is transmitted to the ACR ([address] [place] [address] of Radiology) Kieran Bruce [facility] Radiology Data Re reduction was employed. Multiplanar reformats were created. FINDINGS:  COMMENT: Delineation of soft tissue planes and detection of pathology is suboptimal in the absence of contrast infusion. The vasculature is not optimally assessed.  NASO/OROPHARYNX: No vascular structures are not well assessed on this noncontrast exam.  No significant inflammatory stranding and/or drainable fluid collection adjacent to the imaged temporalis musculature bilaterally.  2. Punctate non-obstructing left greater than right infe AORTA (IV ONLY) (CPT=71260)    Result Date: 10/11/2021  PROCEDURE: CT CHEST PE AORTA (IV ONLY) (CPT=71260)  COMPARISON: [facility], CT ABDOMEN PELVIS IV CONTRAST NO ORAL (ER), 10/11/2021, 6:58 PM.  [facility], XR CHEST AP PO report for further details. BONES: No bony lesion or fracture is seen. There are mild spondylotic changes throughout the thoracic spine.   Partially imaged spinal cord stimulator leads ascending in the subcutaneous fat and posterior paraspinal musculature BILIARY: There is vicarious excretion of contrast throughout the gallbladder from the preceding chest CT. The gallbladder is nondilated. There is no biliary ductal dilatation. PANCREAS: No lesion, fluid collection, ductal dilatation, or atrophy.   SPLEEN: contrast CT or MRI of the abdomen on a nonemergent outpatient basis to attempt more definitive characterization (MRI may be contraindicated due to the patient's spinal stimulator device).   Additionally, a few subcentimeter circumscribed hypodense lesions a as needed for Nausea. Qty: 6 tablet Refills: 0    albuterol 108 (90 Base) MCG/ACT Inhalation Aero Soln  Inhale 2 puffs into the lungs every 4 (four) hours as needed for Wheezing.   Qty: 18 g Refills: 0    !! predniSONE 20 MG Oral Tab  Take 2 tablets (40 mg

## 2021-10-11 NOTE — ED INITIAL ASSESSMENT (HPI)
Cough x 1 month  Notes shortness of breath due to cough, 99% room air    Notes abdominal pain, mid x weeks  Seen here 10/9
How Severe Is Your Skin Lesion?: mild
Has Your Skin Lesion Been Treated?: not been treated
Is This A New Presentation, Or A Follow-Up?: Growth

## 2021-10-12 ENCOUNTER — TELEPHONE (OUTPATIENT)
Dept: CASE MANAGEMENT | Facility: HOSPITAL | Age: 66
End: 2021-10-12

## 2021-10-12 NOTE — CM/SW NOTE
ERCM WAS ASKED BY DR. MCUHGH TO HELP PT GET AN APPOINTMENT SOONER WITH  [facility] (PULMONOLOGY). FOR THIS PATIENT.    WILL FOLLOW UP WITH ERCM IN THE MORNING ON 10- TO EXPEDITE THIS REQUEST.    [facility] [phone]

## 2021-10-12 NOTE — PROGRESS NOTES
Per patient, he had to cancel his September appointment because he was sick. Patient confirms he currently does not have an appointment scheduled. Patient will call Dr. Clovis Barron office for an appointment and will call [facility] if he needs help getting a sooner appointment.
Yes

## 2021-11-15 ENCOUNTER — APPOINTMENT (OUTPATIENT)
Dept: CT IMAGING | Facility: HOSPITAL | Age: 66
End: 2021-11-15
Attending: EMERGENCY MEDICINE
Payer: MEDICARE

## 2021-11-15 ENCOUNTER — HOSPITAL ENCOUNTER (EMERGENCY)
Facility: HOSPITAL | Age: 66
Discharge: HOME | End: 2021-11-15
Attending: EMERGENCY MEDICINE
Payer: MEDICARE

## 2021-11-15 VITALS
BODY MASS INDEX: 26.66 KG/M2 | DIASTOLIC BLOOD PRESSURE: 70 MMHG | HEART RATE: 76 BPM | RESPIRATION RATE: 25 BRPM | OXYGEN SATURATION: 99 % | TEMPERATURE: 98 F | SYSTOLIC BLOOD PRESSURE: 145 MMHG | HEIGHT: 65 IN | WEIGHT: 160 LBS

## 2021-11-15 DIAGNOSIS — R07.81 RIB PAIN ON RIGHT SIDE: Primary | ICD-10-CM

## 2021-11-15 PROCEDURE — 93005 ELECTROCARDIOGRAM TRACING: CPT

## 2021-11-15 PROCEDURE — 84484 ASSAY OF TROPONIN QUANT: CPT | Performed by: EMERGENCY MEDICINE

## 2021-11-15 PROCEDURE — 99285 EMERGENCY DEPT VISIT HI MDM: CPT

## 2021-11-15 PROCEDURE — 80048 BASIC METABOLIC PNL TOTAL CA: CPT | Performed by: EMERGENCY MEDICINE

## 2021-11-15 PROCEDURE — 74177 CT ABD & PELVIS W/CONTRAST: CPT | Performed by: EMERGENCY MEDICINE

## 2021-11-15 PROCEDURE — 71260 CT THORAX DX C+: CPT | Performed by: EMERGENCY MEDICINE

## 2021-11-15 PROCEDURE — 85027 COMPLETE CBC AUTOMATED: CPT | Performed by: EMERGENCY MEDICINE

## 2021-11-15 PROCEDURE — 80076 HEPATIC FUNCTION PANEL: CPT | Performed by: EMERGENCY MEDICINE

## 2021-11-15 PROCEDURE — 85007 BL SMEAR W/DIFF WBC COUNT: CPT | Performed by: EMERGENCY MEDICINE

## 2021-11-15 PROCEDURE — 96374 THER/PROPH/DIAG INJ IV PUSH: CPT

## 2021-11-15 PROCEDURE — 93010 ELECTROCARDIOGRAM REPORT: CPT | Performed by: EMERGENCY MEDICINE

## 2021-11-15 PROCEDURE — 85379 FIBRIN DEGRADATION QUANT: CPT | Performed by: EMERGENCY MEDICINE

## 2021-11-15 PROCEDURE — 83690 ASSAY OF LIPASE: CPT | Performed by: EMERGENCY MEDICINE

## 2021-11-15 PROCEDURE — 85025 COMPLETE CBC W/AUTO DIFF WBC: CPT | Performed by: EMERGENCY MEDICINE

## 2021-11-15 RX ORDER — MORPHINE SULFATE 4 MG/ML
4 INJECTION, SOLUTION INTRAMUSCULAR; INTRAVENOUS ONCE
Status: COMPLETED | OUTPATIENT
Start: 2021-11-15 | End: 2021-11-15

## 2021-11-15 NOTE — ED PROVIDER NOTES
Patient Seen in: [facility] AND [facility] Emergency Department      History   Patient presents with:  Cough  Pain    Stated Complaint: coughing, everette, pain on right ribs when inhaling     Subjective:   HPI  73 yoM history of CKD, hypertension, presents for evalu rate and regular rhythm. Heart sounds: Normal heart sounds. Comments:  There is tenderness to palpation of the right distal anterior ribs without gross deformity or paradoxical movement  Pulmonary:      Effort: Pulmonary effort is normal.      Charu Notable for the following components:    WBC 13.9 (*)     HGB 12.1 (*)     HCT 37.8 (*)     RDW-SD 57.9 (*)     RDW 19.8 (*)     Neutrophil Absolute Prelim 10.27 (*)     All other components within normal limits   LIPASE - Normal   TROPONIN I - Normal   CB adenopathy. VASCULATURE: Mild calcific atherosclerosis thoracic aorta which is normal in caliber. No evidence of thoracic aortic dissection. No evidence of pulmonary artery filling defect.  CARDIAC: No enlargement, pericardial thickening, or significant a flash filling hemangioma and nonemergent follow-up CT or MRI of the abdomen without with contrast can be performed for more definitive characterization (MRI again may be contraindicated given the patient's spinal stimulator device).   Lesser incidental fi

## 2021-11-15 NOTE — ED INITIAL ASSESSMENT (HPI)
Patient from home with c/o right sided rib pain after a dry cough that began Thursday. C/o difficulty breathing for the past 2 months.

## 2021-11-15 NOTE — ED QUICK NOTES
Provided discharge instructions, pt. And family member verbalized understanding. Ambulated from ER with steady gate.

## 2021-12-01 ENCOUNTER — HOSPITAL ENCOUNTER (OUTPATIENT)
Dept: LAB | Age: 66
Discharge: HOME | End: 2021-12-01
Attending: OTOLARYNGOLOGY

## 2021-12-01 PROCEDURE — 88305 TISSUE EXAM BY PATHOLOGIST: CPT | Performed by: CLINICAL MEDICAL LABORATORY

## 2021-12-07 LAB
ASR DISCLAIMER: NORMAL
CASE RPRT: NORMAL
CLINICAL INFO: NORMAL
PATH REPORT.FINAL DX SPEC: NORMAL
PATH REPORT.GROSS SPEC: NORMAL

## 2023-12-05 ENCOUNTER — HOSPITAL ENCOUNTER (EMERGENCY)
Facility: HOSPITAL | Age: 68
Discharge: HOME | End: 2023-12-05
Attending: EMERGENCY MEDICINE
Payer: MEDICARE

## 2023-12-05 VITALS
SYSTOLIC BLOOD PRESSURE: 157 MMHG | TEMPERATURE: 98 F | DIASTOLIC BLOOD PRESSURE: 58 MMHG | RESPIRATION RATE: 27 BRPM | OXYGEN SATURATION: 98 % | HEART RATE: 59 BPM

## 2023-12-05 DIAGNOSIS — G43.801 OTHER MIGRAINE WITH STATUS MIGRAINOSUS, NOT INTRACTABLE: Primary | ICD-10-CM

## 2023-12-05 LAB
ALBUMIN SERPL-MCNC: 4.2 G/DL (ref 3.2–4.8)
ALP LIVER SERPL-CCNC: 34 U/L
ALT SERPL-CCNC: 42 U/L
ANION GAP SERPL CALC-SCNC: 12 MMOL/L (ref 0–18)
AST SERPL-CCNC: 35 U/L (ref ?–34)
ATRIAL RATE: 61 BPM
BASOPHILS # BLD AUTO: 0.03 X10(3) UL (ref 0–0.2)
BASOPHILS NFR BLD AUTO: 0.3 %
BILIRUB DIRECT SERPL-MCNC: <0.1 MG/DL (ref ?–0.3)
BILIRUB SERPL-MCNC: 0.4 MG/DL (ref 0.2–1.1)
BUN BLD-MCNC: 20 MG/DL (ref 9–23)
BUN/CREAT SERPL: 13 (ref 10–20)
CALCIUM BLD-MCNC: 9.2 MG/DL (ref 8.7–10.4)
CHLORIDE SERPL-SCNC: 109 MMOL/L (ref 98–112)
CO2 SERPL-SCNC: 19 MMOL/L (ref 21–32)
CREAT BLD-MCNC: 1.54 MG/DL
DEPRECATED RDW RBC AUTO: 50.1 FL (ref 35.1–46.3)
EGFRCR SERPLBLD CKD-EPI 2021: 49 ML/MIN/1.73M2 (ref 60–?)
EOSINOPHIL # BLD AUTO: 0.02 X10(3) UL (ref 0–0.7)
EOSINOPHIL NFR BLD AUTO: 0.2 %
ERYTHROCYTE [DISTWIDTH] IN BLOOD BY AUTOMATED COUNT: 16.9 % (ref 11–15)
GLUCOSE BLD-MCNC: 90 MG/DL (ref 70–99)
HCT VFR BLD AUTO: 38.8 %
HGB BLD-MCNC: 12.4 G/DL
IMM GRANULOCYTES # BLD AUTO: 0.11 X10(3) UL (ref 0–1)
IMM GRANULOCYTES NFR BLD: 1.3 %
LYMPHOCYTES # BLD AUTO: 1.71 X10(3) UL (ref 1–4)
LYMPHOCYTES NFR BLD AUTO: 19.5 %
MCH RBC QN AUTO: 26.4 PG (ref 26–34)
MCHC RBC AUTO-ENTMCNC: 32 G/DL (ref 31–37)
MCV RBC AUTO: 82.7 FL
MONOCYTES # BLD AUTO: 0.65 X10(3) UL (ref 0.1–1)
MONOCYTES NFR BLD AUTO: 7.4 %
NEUTROPHILS # BLD AUTO: 6.27 X10 (3) UL (ref 1.5–7.7)
NEUTROPHILS # BLD AUTO: 6.27 X10(3) UL (ref 1.5–7.7)
NEUTROPHILS NFR BLD AUTO: 71.3 %
OSMOLALITY SERPL CALC.SUM OF ELEC: 292 MOSM/KG (ref 275–295)
P AXIS: 75 DEGREES
P-R INTERVAL: 166 MS
PLATELET # BLD AUTO: 219 10(3)UL (ref 150–450)
POTASSIUM SERPL-SCNC: 4.6 MMOL/L (ref 3.5–5.1)
PROT SERPL-MCNC: 7 G/DL (ref 5.7–8.2)
Q-T INTERVAL: 382 MS
QRS DURATION: 78 MS
QTC CALCULATION (BEZET): 384 MS
R AXIS: 63 DEGREES
RBC # BLD AUTO: 4.69 X10(6)UL
SODIUM SERPL-SCNC: 140 MMOL/L (ref 136–145)
T AXIS: 173 DEGREES
VENTRICULAR RATE: 61 BPM
WBC # BLD AUTO: 8.8 X10(3) UL (ref 4–11)

## 2023-12-05 PROCEDURE — 85025 COMPLETE CBC W/AUTO DIFF WBC: CPT | Performed by: EMERGENCY MEDICINE

## 2023-12-05 PROCEDURE — 80048 BASIC METABOLIC PNL TOTAL CA: CPT | Performed by: EMERGENCY MEDICINE

## 2023-12-05 PROCEDURE — 93005 ELECTROCARDIOGRAM TRACING: CPT

## 2023-12-05 PROCEDURE — 96374 THER/PROPH/DIAG INJ IV PUSH: CPT

## 2023-12-05 PROCEDURE — 99284 EMERGENCY DEPT VISIT MOD MDM: CPT

## 2023-12-05 PROCEDURE — 80076 HEPATIC FUNCTION PANEL: CPT | Performed by: EMERGENCY MEDICINE

## 2023-12-05 PROCEDURE — 96375 TX/PRO/DX INJ NEW DRUG ADDON: CPT

## 2023-12-05 PROCEDURE — 96361 HYDRATE IV INFUSION ADD-ON: CPT

## 2023-12-05 PROCEDURE — 99285 EMERGENCY DEPT VISIT HI MDM: CPT

## 2023-12-05 PROCEDURE — 93010 ELECTROCARDIOGRAM REPORT: CPT

## 2023-12-05 RX ORDER — DIPHENHYDRAMINE HYDROCHLORIDE 50 MG/ML
25 INJECTION INTRAMUSCULAR; INTRAVENOUS ONCE
Status: COMPLETED | OUTPATIENT
Start: 2023-12-05 | End: 2023-12-05

## 2023-12-05 RX ORDER — KETOROLAC TROMETHAMINE 15 MG/ML
15 INJECTION, SOLUTION INTRAMUSCULAR; INTRAVENOUS ONCE
Status: DISCONTINUED | OUTPATIENT
Start: 2023-12-05 | End: 2023-12-05

## 2023-12-05 RX ORDER — METOCLOPRAMIDE HYDROCHLORIDE 5 MG/ML
10 INJECTION INTRAMUSCULAR; INTRAVENOUS ONCE
Status: COMPLETED | OUTPATIENT
Start: 2023-12-05 | End: 2023-12-05

## 2023-12-05 RX ORDER — MORPHINE SULFATE 4 MG/ML
4 INJECTION, SOLUTION INTRAMUSCULAR; INTRAVENOUS ONCE
Status: COMPLETED | OUTPATIENT
Start: 2023-12-05 | End: 2023-12-05

## 2023-12-05 NOTE — ED QUICK NOTES
Pt reporting minimal improvement in headache after meds. V/o w/ read back received for 4 mg IVP Morphine from Dr. Philip Molina.

## 2023-12-05 NOTE — ED INITIAL ASSESSMENT (HPI)
Patient aox3 to ed via private vehicle patient reported was at infusion center when patient's bp found to be high. Patient admits to losing mother, which may be causing patient's bp to be high per patient. +dizziness. +right sided headache.

## 2024-02-25 ENCOUNTER — APPOINTMENT (OUTPATIENT)
Dept: MRI IMAGING | Facility: HOSPITAL | Age: 69
End: 2024-02-25
Attending: EMERGENCY MEDICINE
Payer: MEDICARE

## 2024-02-25 ENCOUNTER — APPOINTMENT (OUTPATIENT)
Dept: CT IMAGING | Facility: HOSPITAL | Age: 69
End: 2024-02-25
Attending: EMERGENCY MEDICINE
Payer: MEDICARE

## 2024-02-25 ENCOUNTER — APPOINTMENT (OUTPATIENT)
Dept: GENERAL RADIOLOGY | Facility: HOSPITAL | Age: 69
End: 2024-02-25
Attending: EMERGENCY MEDICINE
Payer: MEDICARE

## 2024-02-25 ENCOUNTER — HOSPITAL ENCOUNTER (INPATIENT)
Facility: HOSPITAL | Age: 69
LOS: 5 days | Discharge: HOME | End: 2024-03-01
Attending: EMERGENCY MEDICINE | Admitting: EMERGENCY MEDICINE
Payer: MEDICARE

## 2024-02-25 DIAGNOSIS — S70.02XA CONTUSION OF LEFT HIP, INITIAL ENCOUNTER: ICD-10-CM

## 2024-02-25 DIAGNOSIS — R29.898 WEAKNESS OF BOTH LOWER EXTREMITIES: ICD-10-CM

## 2024-02-25 DIAGNOSIS — G90.512 COMPLEX REGIONAL PAIN SYNDROME TYPE 1 OF LEFT UPPER EXTREMITY: ICD-10-CM

## 2024-02-25 DIAGNOSIS — G61.0 GBS (GUILLAIN BARRE SYNDROME) (HCC): ICD-10-CM

## 2024-02-25 DIAGNOSIS — S39.012A STRAIN OF LUMBAR REGION, INITIAL ENCOUNTER: Primary | ICD-10-CM

## 2024-02-25 LAB
ANION GAP SERPL CALC-SCNC: 8 MMOL/L (ref 0–18)
BASOPHILS # BLD AUTO: 0.03 X10(3) UL (ref 0–0.2)
BASOPHILS NFR BLD AUTO: 0.4 %
BASOPHILS NFR CSF: 0 %
BUN BLD-MCNC: 40 MG/DL (ref 9–23)
BUN/CREAT SERPL: 20.4 (ref 10–20)
CALCIUM BLD-MCNC: 9.2 MG/DL (ref 8.7–10.4)
CHLORIDE SERPL-SCNC: 110 MMOL/L (ref 98–112)
CK SERPL-CCNC: 106 U/L
CO2 SERPL-SCNC: 22 MMOL/L (ref 21–32)
COLOR CSF: COLORLESS
CREAT BLD-MCNC: 1.96 MG/DL
DEPRECATED RDW RBC AUTO: 47.5 FL (ref 35.1–46.3)
EGFRCR SERPLBLD CKD-EPI 2021: 37 ML/MIN/1.73M2 (ref 60–?)
EOSINOPHIL # BLD AUTO: 0 X10(3) UL (ref 0–0.7)
EOSINOPHIL NFR BLD AUTO: 0 %
EOSINOPHIL NFR CSF: 0 %
ERYTHROCYTE [DISTWIDTH] IN BLOOD BY AUTOMATED COUNT: 16.2 % (ref 11–15)
GLUCOSE BLD-MCNC: 126 MG/DL (ref 70–99)
GLUCOSE BLDC GLUCOMTR-MCNC: 129 MG/DL (ref 70–99)
GLUCOSE CSF-MCNC: 89 MG/DL (ref 40–70)
HCT VFR BLD AUTO: 40.7 %
HGB BLD-MCNC: 12.9 G/DL
IMM GRANULOCYTES # BLD AUTO: 0.29 X10(3) UL (ref 0–1)
IMM GRANULOCYTES NFR BLD: 3.7 %
LYMPHOCYTES # BLD AUTO: 1.63 X10(3) UL (ref 1–4)
LYMPHOCYTES NFR BLD AUTO: 21 %
LYMPHOCYTES NFR CSF: 59 % (ref 40–80)
MCH RBC QN AUTO: 25.7 PG (ref 26–34)
MCHC RBC AUTO-ENTMCNC: 31.7 G/DL (ref 31–37)
MCV RBC AUTO: 81.1 FL
MONOCYTES # BLD AUTO: 0.29 X10(3) UL (ref 0.1–1)
MONOCYTES NFR BLD AUTO: 3.7 %
MONOS+MACROS NFR CSF: 35 % (ref 15–45)
NEUTROPHILS # BLD AUTO: 5.52 X10 (3) UL (ref 1.5–7.7)
NEUTROPHILS # BLD AUTO: 5.52 X10(3) UL (ref 1.5–7.7)
NEUTROPHILS NFR BLD AUTO: 71.2 %
NEUTROPHILS NFR CSF: 6 % (ref 0–6)
OSMOLALITY SERPL CALC.SUM OF ELEC: 301 MOSM/KG (ref 275–295)
PLATELET # BLD AUTO: 281 10(3)UL (ref 150–450)
POTASSIUM SERPL-SCNC: 3.4 MMOL/L (ref 3.5–5.1)
PROT PATTERN CSF ELPH-IMP: 53.9 MG/DL (ref 15–45)
RBC # BLD AUTO: 5.02 X10(6)UL
RBC # CSF: 48 /CUMM (ref ?–1)
SODIUM SERPL-SCNC: 140 MMOL/L (ref 136–145)
TOTAL CELLS COUNTED CSF: 7 /CUMM (ref 0–5)
TOTAL CELLS COUNTED FLD: 85
TOTAL VOLUME CSF: 1 ML
TSI SER-ACNC: 1.61 MIU/ML (ref 0.55–4.78)
TUBE # CSF: 3
TURBIDITY CSF QL: CLEAR
WBC # BLD AUTO: 7.8 X10(3) UL (ref 4–11)
WBC # CSF: 7 /CUMM

## 2024-02-25 PROCEDURE — B01B1ZZ FLUOROSCOPY OF SPINAL CORD USING LOW OSMOLAR CONTRAST: ICD-10-PCS | Performed by: EMERGENCY MEDICINE

## 2024-02-25 PROCEDURE — 009U3ZX DRAINAGE OF SPINAL CANAL, PERCUTANEOUS APPROACH, DIAGNOSTIC: ICD-10-PCS | Performed by: EMERGENCY MEDICINE

## 2024-02-25 PROCEDURE — 70450 CT HEAD/BRAIN W/O DYE: CPT | Performed by: EMERGENCY MEDICINE

## 2024-02-25 PROCEDURE — 99223 1ST HOSP IP/OBS HIGH 75: CPT | Performed by: HOSPITALIST

## 2024-02-25 PROCEDURE — 30233S1 TRANSFUSION OF NONAUTOLOGOUS GLOBULIN INTO PERIPHERAL VEIN, PERCUTANEOUS APPROACH: ICD-10-PCS | Performed by: HOSPITALIST

## 2024-02-25 PROCEDURE — 72100 X-RAY EXAM L-S SPINE 2/3 VWS: CPT | Performed by: EMERGENCY MEDICINE

## 2024-02-25 PROCEDURE — 73502 X-RAY EXAM HIP UNI 2-3 VIEWS: CPT | Performed by: EMERGENCY MEDICINE

## 2024-02-25 RX ORDER — MORPHINE SULFATE 2 MG/ML
1 INJECTION, SOLUTION INTRAMUSCULAR; INTRAVENOUS EVERY 2 HOUR PRN
Status: DISCONTINUED | OUTPATIENT
Start: 2024-02-25 | End: 2024-03-01

## 2024-02-25 RX ORDER — SENNOSIDES 8.6 MG
17.2 TABLET ORAL NIGHTLY PRN
Status: DISCONTINUED | OUTPATIENT
Start: 2024-02-25 | End: 2024-03-01

## 2024-02-25 RX ORDER — MORPHINE SULFATE 2 MG/ML
2 INJECTION, SOLUTION INTRAMUSCULAR; INTRAVENOUS EVERY 2 HOUR PRN
Status: DISCONTINUED | OUTPATIENT
Start: 2024-02-25 | End: 2024-03-01

## 2024-02-25 RX ORDER — LIDOCAINE 50 MG/G
1 PATCH TOPICAL EVERY 24 HOURS
Qty: 10 PATCH | Refills: 0 | Status: ON HOLD | OUTPATIENT
Start: 2024-02-25 | End: 2024-03-06

## 2024-02-25 RX ORDER — PREDNISONE 10 MG/1
10 TABLET ORAL 2 TIMES DAILY
Status: DISCONTINUED | OUTPATIENT
Start: 2024-02-25 | End: 2024-03-01

## 2024-02-25 RX ORDER — KETOROLAC TROMETHAMINE 15 MG/ML
15 INJECTION, SOLUTION INTRAMUSCULAR; INTRAVENOUS ONCE
Status: COMPLETED | OUTPATIENT
Start: 2024-02-25 | End: 2024-02-25

## 2024-02-25 RX ORDER — METOPROLOL TARTRATE 50 MG/1
50 TABLET, FILM COATED ORAL
Status: DISCONTINUED | OUTPATIENT
Start: 2024-02-25 | End: 2024-02-25

## 2024-02-25 RX ORDER — POTASSIUM CHLORIDE 20 MEQ/1
40 TABLET, EXTENDED RELEASE ORAL EVERY 4 HOURS
Status: COMPLETED | OUTPATIENT
Start: 2024-02-25 | End: 2024-02-25

## 2024-02-25 RX ORDER — ACETAMINOPHEN 500 MG
500 TABLET ORAL EVERY 4 HOURS PRN
Status: DISCONTINUED | OUTPATIENT
Start: 2024-02-25 | End: 2024-03-01

## 2024-02-25 RX ORDER — LABETALOL HYDROCHLORIDE 5 MG/ML
10 INJECTION, SOLUTION INTRAVENOUS EVERY 4 HOURS PRN
Status: DISCONTINUED | OUTPATIENT
Start: 2024-02-25 | End: 2024-03-01

## 2024-02-25 RX ORDER — CLOPIDOGREL BISULFATE 75 MG/1
75 TABLET ORAL DAILY
Status: DISCONTINUED | OUTPATIENT
Start: 2024-02-25 | End: 2024-03-01

## 2024-02-25 RX ORDER — METOPROLOL TARTRATE 50 MG/1
100 TABLET, FILM COATED ORAL 2 TIMES DAILY
Status: DISCONTINUED | OUTPATIENT
Start: 2024-02-25 | End: 2024-03-01

## 2024-02-25 RX ORDER — ALFUZOSIN HYDROCHLORIDE 10 MG/1
10 TABLET, EXTENDED RELEASE ORAL DAILY
Status: ON HOLD | COMMUNITY

## 2024-02-25 RX ORDER — ISOSORBIDE DINITRATE 10 MG/1
30 TABLET ORAL
Status: DISCONTINUED | OUTPATIENT
Start: 2024-02-25 | End: 2024-02-25

## 2024-02-25 RX ORDER — HEPARIN SODIUM 5000 [USP'U]/ML
5000 INJECTION, SOLUTION INTRAVENOUS; SUBCUTANEOUS EVERY 12 HOURS SCHEDULED
Status: DISCONTINUED | OUTPATIENT
Start: 2024-02-25 | End: 2024-03-01

## 2024-02-25 RX ORDER — POLYETHYLENE GLYCOL 3350 17 G/17G
17 POWDER, FOR SOLUTION ORAL DAILY PRN
Status: DISCONTINUED | OUTPATIENT
Start: 2024-02-25 | End: 2024-03-01

## 2024-02-25 RX ORDER — CYCLOBENZAPRINE HCL 10 MG
10 TABLET ORAL 3 TIMES DAILY PRN
Status: DISCONTINUED | OUTPATIENT
Start: 2024-02-25 | End: 2024-03-01

## 2024-02-25 RX ORDER — PREDNISONE 20 MG/1
40 TABLET ORAL DAILY
Qty: 10 TABLET | Refills: 0 | Status: SHIPPED | OUTPATIENT
Start: 2024-02-25 | End: 2024-03-01

## 2024-02-25 RX ORDER — ONDANSETRON 2 MG/ML
4 INJECTION INTRAMUSCULAR; INTRAVENOUS EVERY 6 HOURS PRN
Status: DISCONTINUED | OUTPATIENT
Start: 2024-02-25 | End: 2024-03-01

## 2024-02-25 RX ORDER — MORPHINE SULFATE 4 MG/ML
4 INJECTION, SOLUTION INTRAMUSCULAR; INTRAVENOUS EVERY 2 HOUR PRN
Status: DISCONTINUED | OUTPATIENT
Start: 2024-02-25 | End: 2024-03-01

## 2024-02-25 RX ORDER — HYDROCODONE BITARTRATE AND ACETAMINOPHEN 5; 325 MG/1; MG/1
1 TABLET ORAL EVERY 6 HOURS PRN
Status: DISCONTINUED | OUTPATIENT
Start: 2024-02-25 | End: 2024-02-27

## 2024-02-25 RX ORDER — MORPHINE SULFATE 2 MG/ML
2 INJECTION, SOLUTION INTRAMUSCULAR; INTRAVENOUS ONCE
Status: COMPLETED | OUTPATIENT
Start: 2024-02-25 | End: 2024-02-25

## 2024-02-25 RX ORDER — AMLODIPINE BESYLATE 5 MG/1
5 TABLET ORAL 2 TIMES DAILY
Status: DISCONTINUED | OUTPATIENT
Start: 2024-02-25 | End: 2024-02-29

## 2024-02-25 RX ORDER — MIDAZOLAM HYDROCHLORIDE 1 MG/ML
2 INJECTION INTRAMUSCULAR; INTRAVENOUS ONCE
Status: COMPLETED | OUTPATIENT
Start: 2024-02-25 | End: 2024-02-25

## 2024-02-25 RX ORDER — LEVOTHYROXINE SODIUM 0.03 MG/1
25 TABLET ORAL
Status: DISCONTINUED | OUTPATIENT
Start: 2024-02-25 | End: 2024-03-01

## 2024-02-25 RX ORDER — CYCLOBENZAPRINE HCL 10 MG
10 TABLET ORAL 2 TIMES DAILY
Qty: 12 TABLET | Refills: 0 | Status: SHIPPED | OUTPATIENT
Start: 2024-02-25 | End: 2024-03-01

## 2024-02-25 RX ORDER — PANTOPRAZOLE SODIUM 40 MG/1
40 TABLET, DELAYED RELEASE ORAL
Status: DISCONTINUED | OUTPATIENT
Start: 2024-02-25 | End: 2024-03-01

## 2024-02-25 RX ORDER — TAMSULOSIN HYDROCHLORIDE 0.4 MG/1
0.4 CAPSULE ORAL
Status: DISCONTINUED | OUTPATIENT
Start: 2024-02-25 | End: 2024-03-01

## 2024-02-25 NOTE — ED INITIAL ASSESSMENT (HPI)
Patient is here with 2 falls since yesterday after he lost his balance & tripped outside while falling on concrete. Patient uses walker. Patient states hitting the head though denied loss of consciousness. Patient is on Plavix. Patient complains also hitting his pelvis and complains of bilateral pelvis pain.   Patient is AXOX4 per EMS. . /70.

## 2024-02-25 NOTE — H&P
[facility]  History & Physical     Maximo Digsg Jr.  : 1955    Status: Emergency  Day #: 0    Attending: Erasmo Armstrong MD  PCP: Lito Pedroza MD     Date of Encounter:  2024  Date of Admission:  2024     Chief Complaint:  leg weakness with fall      History of Present Illness:     Maximo Diggs Jr. is a(n) 68 year old male with a h/o CAD, HL, HTN, retinitis pigmentosa, neuropathy, work related spinal fractures in  s/p rods L3-5. He has had b/l hip pain the past 2 weeks, and legs have been weak and giving out. He came to ED after a fall. CT brain and XR lumbar spine were unremarkable. LP was done in ED. He is being admitted.      Past Medical History:   Diagnosis Date    PIERCE (acute kidney injury) ([facility]) 2021    Arthritis     CAD (coronary artery disease)     High cholesterol     Neuropathy     Retinal pigmentation     Spine fracture     L3, L4, L5.  Rods placed    Thyroid disease     Ulcer     Unspecified essential hypertension      Past Surgical History:   Procedure Laterality Date    ANGIOPLASTY (CORONARY)      with stent placement x 1    BACK SURGERY      EXCIS JT LINING,PROX I-P JT,EACH Right 10/14/2015    Procedure: ARTHROTOMY OF FINGER JOINT INTERPHALANGEAL;  Surgeon: Olvin Vences MD;  Location: [facility]    EXCIS JT LINING,PROX I-P JT,EACH Right 10/14/2015    Procedure: ARTHROTOMY OF FINGER JOINT INTERPHALANGEAL;  Surgeon: Olvin Vences MD;  Location: [facility]    OTHER SURGICAL HISTORY  ,, ,     back surgery     OTHER SURGICAL HISTORY      \"ulcer surgery\"    OTHER SURGICAL HISTORY Right 10/14/15    right arthrotomy, debridement, synovectomy of the joints w/biopsy right index and long finger MP joint    PATIENT DOCUMENTED NOT TO HAVE EXPERIENCED ANY OF THE FOLLOWING EVENTS Right 10/14/2015    Procedure: ARTHROTOMY OF FINGER JOINT INTERPHALANGEAL;  Surgeon: Olvin Vences MD;  Location: [facility]    PATIENT WITH  PREOPERATIVE ORDER FOR IV ANTIBIOTIC SURGICAL SITE INFECT Right 10/14/2015    Procedure: ARTHROTOMY OF FINGER JOINT INTERPHALANGEAL;  Surgeon: Olvin Vences MD;  Location: [facility]       Family History   Problem Relation Age of Onset    Hypertension Father     Cancer Father         prostate cancer    Hypertension Mother     Diabetes Sister        Social History:  Social History     Socioeconomic History    Marital status:    Tobacco Use    Smoking status: Never    Smokeless tobacco: Never   Vaping Use    Vaping Use: Never used   Substance and Sexual Activity    Alcohol use: No    Drug use: No       Allergies:   Allergies   Allergen Reactions    Aspirin NAUSEA AND VOMITING    Clavulanic Acid HIVES    Losartan MYALGIA    Penicillins HIVES    Potassium HIVES    Seafood ANAPHYLAXIS    Hydralazine UNKNOWN    Iodine (Topical) UNKNOWN    Pregabalin OTHER (SEE COMMENTS)    Seasonal           ROS/Exam       A comprehensive 12 point review of systems was negative. See History of Present Illness for other pertinent details.     Physical Exam:     Temp:  [97.9 °F (36.6 °C)] 97.9 °F (36.6 °C)  Pulse:  [47-60] 50  Resp:  [11-20] 11  BP: (146-193)/(50-63) 163/57  SpO2:  [97 %-100 %] 99 %  General:  Alert, no distress  HEENT:  Normocephalic, atraumatic  Neck:  Supple, symmetrical  Cardiac:  Regular rate, regular rhythm  Pulmonary:  Clear to auscultation bilaterally, respirations unlabored  Gastrointestinal:  Soft, non-tender, normal bowel sounds  Musculoskeletal:  No joint swelling  Extremities:  No edema, no cyanosis, no clubbing  Neurologic:  nonfocal  Psychiatric:  Normal affect, calm and appropriate  Skin:  No rash, no lesion     Results:       Recent Labs   Lab 02/25/24  0938   WBC 7.8   HGB 12.9*   HCT 40.7   .0   RBC 5.02   MCV 81.1   MCH 25.7*   MCHC 31.7   RDW 16.2*   NEPRELIM 5.52     Recent Labs   Lab 02/25/24  0939   BUN 40*   CREATSERUM 1.96*   CA 9.2      K 3.4*      CO2 22.0   GLU  126*   TSH 1.606     CT BRAIN OR HEAD (31676)    Result Date: 2/25/2024  CONCLUSION: No acute finding    Dictated by (CST): Ken Chapa MD on 2/25/2024 at 10:21 AM     Finalized by (CST): Ken Chapa MD on 2/25/2024 at 10:22 AM          XR LUMBAR SPINE (MIN 2 VIEWS) (CPT=72100)    Result Date: 2/25/2024  CONCLUSION:   No acute fracture or malalignment.  Mild chronic appearing compression deformities T12, L1 and L2.  Postsurgical change lumbar spine as above with moderate lumbar spine degenerative change.    Dictated by (CST): Lokesh Boland MD on 2/25/2024 at 8:26 AM     Finalized by (CST): Lokesh Boland MD on 2/25/2024 at 8:30 AM          XR HIP W OR WO PELVIS 2 OR 3 VIEWS, LEFT (CPT=73502)    Result Date: 2/25/2024  CONCLUSION:   No acute fracture or dislocation.  Mild left hip osteoarthritis.    Dictated by (CST): Lokesh Boland MD on 2/25/2024 at 8:25 AM     Finalized by (CST): Lokesh Boland MD on 2/25/2024 at 8:26 AM                Assessment and Plan:     B/l LE weakness with falls  -r/o autoimmune neuro process  -CT brain ok  -XR lumbar spine no fracture  -neurology consulted  -f/u CSF  -MRI lumbar spine pending  -CK aldolase, myasthenia panel pending  -consideration of IVIG per neuro    H/o temporal arteritis  -cont prednisone taper    Back and hip pain  -norco, morphine IV prn    HTN urgency  -resume home meds  -IV med prn    Other:  -CAD with h/o distant PCI/stent  -neuropathy  -HL  -decreased peripheral vision from retinitis pigmentosa    DVT ppx:  heparin sq     **Certification      PHYSICIAN Certification of Need for Inpatient Hospitalization - Initial Certification    Patient will require inpatient services that will reasonably be expected to span two midnight's based on the clinical documentation in H+P.   Based on patients current state of illness, I anticipate that, after discharge, patient will require TBD.    Erasmo Armstrong MD

## 2024-02-25 NOTE — ED QUICK NOTES
Orders for admission, patient is aware of plan and ready to go upstairs. Any questions, please call ED RN JURATE at extension [phone].     Patient Covid vaccination status: Fully vaccinated     COVID Test Ordered in ED: None    COVID Suspicion at Admission: N/A    Running Infusions:  None    Mental Status/LOC at time of transport: AXOX4    Other pertinent information:   CIWA score: N/A   NIH score:  N/A

## 2024-02-25 NOTE — ED PROVIDER NOTES
Patient Seen in: [facility] Emergency Department    History     Chief Complaint   Patient presents with    Fall     Stated Complaint:     HPI    Patient complains of mechanical fall that occurred  yesterday.  Patient complains of injury to l hip and lower back.  Pain rated 10/10.  Patient is  on blood thinners.  Denies head neck chest or abdominal injury,.  When asking about the fall patient reports that he normally walks with a cane due to visual issues but had no trouble with strength.  States about 2 weeks ago suddenly noticed that his legs were weak.  No preceding viral illness, no gastroenteritis no inciting event.  And over the past couple of weeks he has gotten progressively weaker can barely walk to the bathroom without using a walker.  This is a drastic change from a month ago had no pain in his back at all up until this morning when he fell.  He does have history of spinal surgery and does have a spinal stimulator.      Alleviating factors: not moving  Exacerbating factors: moving    Past Medical History:   Diagnosis Date    PIERCE (acute kidney injury) ([facility]) 9/23/2021    Arthritis     CAD (coronary artery disease)     High cholesterol     Neuropathy     Retinal pigmentation     Spine fracture 1998    L3, L4, L5.  Rods placed    Thyroid disease     Ulcer     Unspecified essential hypertension        Past Surgical History:   Procedure Laterality Date    ANGIOPLASTY (CORONARY)  2012    with stent placement x 1    BACK SURGERY      EXCIS JT LINING,PROX I-P JT,EACH Right 10/14/2015    Procedure: ARTHROTOMY OF FINGER JOINT INTERPHALANGEAL;  Surgeon: Olvin Vences MD;  Location: [facility]    EXCIS JT LINING,PROX I-P JT,EACH Right 10/14/2015    Procedure: ARTHROTOMY OF FINGER JOINT INTERPHALANGEAL;  Surgeon: Olvin Vences MD;  Location: [facility]    OTHER SURGICAL HISTORY  2000,2002, 2004, 2006    back surgery     OTHER SURGICAL HISTORY  1982    \"ulcer surgery\"    OTHER SURGICAL HISTORY Right  10/14/15    right arthrotomy, debridement, synovectomy of the joints w/biopsy right index and long finger MP joint    PATIENT DOCUMENTED NOT TO HAVE EXPERIENCED ANY OF THE FOLLOWING EVENTS Right 10/14/2015    Procedure: ARTHROTOMY OF FINGER JOINT INTERPHALANGEAL;  Surgeon: Olvin Vences MD;  Location: [facility]    PATIENT WITH PREOPERATIVE ORDER FOR IV ANTIBIOTIC SURGICAL SITE INFECT Right 10/14/2015    Procedure: ARTHROTOMY OF FINGER JOINT INTERPHALANGEAL;  Surgeon: Olvin Vences MD;  Location: [facility]            Family History   Problem Relation Age of Onset    Hypertension Father     Cancer Father         prostate cancer    Hypertension Mother     Diabetes Sister        Social History     Socioeconomic History    Marital status:    Tobacco Use    Smoking status: Never    Smokeless tobacco: Never   Vaping Use    Vaping Use: Never used   Substance and Sexual Activity    Alcohol use: No    Drug use: No       Review of Systems    Positive for stated complaint:   Other systems are as noted in HPI.  Constitutional and vital signs reviewed.      All other systems reviewed and negative except as noted above.    PSFH elements reviewed from today and agreed except as otherwise stated in HPI.    Physical Exam     ED Triage Vitals [02/25/24 0726]   BP (!) 193/63   Pulse 60   Resp 11   Temp 97.9 °F (36.6 °C)   Temp src    SpO2 98 %   O2 Device        Current:/50   Pulse (!) 47   Temp 97.9 °F (36.6 °C)   Resp 14   Ht 165.1 cm (5' 5\")   Wt 69.4 kg   SpO2 99%   BMI 25.46 kg/m²    PULSE OX nl  GENERAL: awake in pain  HEAD: normocephalic, atraumatic,   EYES: PERRLA, EOMI, conj sclera clear  THROAT: mmm, no lesions  NECK: supple, no midline tenderness, no pain with axial load, ROM intact   LUNGS: no resp distress, cta bilateral  CARDIO: RRR without murmur  GI: abdomen is soft and non tender, no masses, nl bowel sounds   EXTREMITIES: l lower and right lower back tenderness, l lat hip tendenress  distal nvs intact  NEURO: alert and oiented *3, 2-12 intact, decreased dtr b knees  SKIN: good skin turgor, no  rashes  PSYCH: calm, cooperative,    Differential includes:hip fx vs contusion from the fall though he does have about a 2-week history of bilateral lower extremity weakness, consider neuropathy will check thyroid, has been on chronic low-dose steroids but does not have upper extremity weakness.  No pain to suggest spinal cord lesion causing the weakness though he does have history of spinal cord surgery.  Concern for progressive ascending process consider GBS    ED Course     Labs Reviewed   BASIC METABOLIC PANEL (8) - Abnormal; Notable for the following components:       Result Value    Glucose 126 (*)     Potassium 3.4 (*)     BUN 40 (*)     Creatinine 1.96 (*)     BUN/CREA Ratio 20.4 (*)     Calculated Osmolality 301 (*)     eGFR-Cr 37 (*)     All other components within normal limits   POCT GLUCOSE - Abnormal; Notable for the following components:    POC Glucose  129 (*)     All other components within normal limits   CBC W/ DIFFERENTIAL - Abnormal; Notable for the following components:    HGB 12.9 (*)     MCH 25.7 (*)     RDW-SD 47.5 (*)     RDW 16.2 (*)     All other components within normal limits   TSH W REFLEX TO FREE T4 - Normal   CBC WITH DIFFERENTIAL WITH PLATELET    Narrative:     The following orders were created for panel order CBC With Differential With Platelet.  Procedure                               Abnormality         Status                     ---------                               -----------         ------                     CBC W/ DIFFERENTIAL[[id number]]          Abnormal            Final result                 Please view results for these tests on the individual orders.   PROTEIN, TOTAL, CSF   GLUCOSE, CEREBROSPINAL FLUID   CELL COUNT, CSF   CSF CULTURE       [facility]       Cardiac Monitor:   Pulse Readings from Last 1 Encounters:   02/25/24 (!) 47   , sinus,      Radiology  findings: CT BRAIN OR HEAD (86035)    Result Date: 2/25/2024  CONCLUSION: No acute finding    Dictated by (CST): Ken Chapa MD on 2/25/2024 at 10:21 AM     Finalized by (CST): Ken Chapa MD on 2/25/2024 at 10:22 AM          XR LUMBAR SPINE (MIN 2 VIEWS) (CPT=72100)    Result Date: 2/25/2024  CONCLUSION:   No acute fracture or malalignment.  Mild chronic appearing compression deformities T12, L1 and L2.  Postsurgical change lumbar spine as above with moderate lumbar spine degenerative change.    Dictated by (CST): Lokesh Boland MD on 2/25/2024 at 8:26 AM     Finalized by (CST): Lokesh Boland MD on 2/25/2024 at 8:30 AM          XR HIP W OR WO PELVIS 2 OR 3 VIEWS, LEFT (CPT=73502)    Result Date: 2/25/2024  CONCLUSION:   No acute fracture or dislocation.  Mild left hip osteoarthritis.    Dictated by (CST): Lokesh Boland MD on 2/25/2024 at 8:25 AM     Finalized by (CST): Lokesh Boland MD on 2/25/2024 at 8:26 AM         CT BRAIN OR HEAD (95960)    Result Date: 2/25/2024  CONCLUSION: No acute finding    Dictated by (CST): Ken Chapa MD on 2/25/2024 at 10:21 AM     Finalized by (CST): Ken Chapa MD on 2/25/2024 at 10:22 AM          XR LUMBAR SPINE (MIN 2 VIEWS) (CPT=72100)    Result Date: 2/25/2024  CONCLUSION:   No acute fracture or malalignment.  Mild chronic appearing compression deformities T12, L1 and L2.  Postsurgical change lumbar spine as above with moderate lumbar spine degenerative change.    Dictated by (CST): Lokesh Boland MD on 2/25/2024 at 8:26 AM     Finalized by (CST): Lokesh Boland MD on 2/25/2024 at 8:30 AM          XR HIP W OR WO PELVIS 2 OR 3 VIEWS, LEFT (CPT=73502)    Result Date: 2/25/2024  CONCLUSION:   No acute fracture or dislocation.  Mild left hip osteoarthritis.    Dictated by (CST): Lokesh Boland MD on 2/25/2024 at 8:25 AM     Finalized by (CST): Lokesh Boland MD on 2/25/2024 at 8:26 AM           I reviewed CT and noted no intracranial bleeding  I  reviewed xray noted no fracture or dislocation      Medical Decision Making  Problems Addressed:  Contusion of left hip, initial encounter: acute illness or injury  Strain of lumbar region, initial encounter: acute illness or injury  Weakness of both lower extremities: acute illness or injury with systemic symptoms that poses a threat to life or bodily functions    Amount and/or Complexity of Data Reviewed  Independent Historian: spouse  Labs: ordered. Decision-making details documented in ED Course.  Radiology: ordered and independent interpretation performed. Decision-making details documented in ED Course.  Discussion of management or test interpretation with external provider(s): Consulted Dr. Agrawal from neurology.  Admit to Dr. Sadler hospitalist service    Risk  Decision regarding hospitalization.      Procedure:  Lumbar puncture:    After verbal informed consent from patient explaining the risks including infection, bleeding, and neurologic damage, and standard timeout procedure completed, a lumbar puncture was performed after the patient was prepped and draped in the usual fashion.  The back was anesthetized with 1% lidocaine.  Approximately 4 cc of clear fluid was obtained.  There were no complications.  The procedure was performed by myself.      Disposition and Plan     Clinical Impression:  1. Strain of lumbar region, initial encounter    2. Contusion of left hip, initial encounter    3. Weakness of both lower extremities        Disposition:  There is no disposition on file for this visit.    Follow-up:  Lito Pedroza MD  [address] [address] [phone]    Follow up        Medications Prescribed:  Current Discharge Medication List        START taking these medications    Details   !! cyclobenzaprine 10 MG Oral Tab Take 1 tablet (10 mg total) by mouth in the morning and 1 tablet (10 mg total) before bedtime. Do all this for 7 days.  Qty: 12 tablet, Refills: 0       lidocaine 5 % External Patch Place 1 patch onto the skin daily for 10 days.  Qty: 10 patch, Refills: 0      !! predniSONE 20 MG Oral Tab Take 2 tablets (40 mg total) by mouth daily for 5 days.  Qty: 10 tablet, Refills: 0       !! - Potential duplicate medications found. Please discuss with provider.

## 2024-02-25 NOTE — CONSULTS
[facility] [address], [address]  [facility] [address]  [phone]          INPATIENT NEUROLOGY   INITIAL CONSULT NOTE       [facility]    Report of Consultation    Maximo Diggs Jr. Patient Status:  Emergency     1955 MRN [id number]    Location [facility] EMERGENCY DEPARTMENT Attending Chilo Carr MD    Hosp Day # 0 PCP Lito Pedroza MD      Date of Admission:  2024  Date of Consult:  2024    HPI:     Maximo Diggs Jr. 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  2 weeks ago he noticed his legs were weak without a provoking illness prior.  His gait has become abnormal to the point where he cannot ambulate without a walker.  Emergency room he was hypertensive 193/63 and was found to be areflexic in his lower extremities.  CT brain with no acute findings.  X-ray of lumbar spine with no fractures.  Spinal fluid was obtained in the ER.    No preceding vaccines.  Lower extremity weakness involved entire leg, not ascending or descending.  No pain or sensory changes.  Now feels pain in his hips after a fall.  Usually uses a cane to ambulate due to low vision.      CURRENT MEDICATIONS  Current Outpatient Medications   Medication Sig Dispense Refill    cyclobenzaprine 10 MG Oral Tab Take 1 tablet (10 mg total) by mouth in the morning and 1 tablet (10 mg total) before bedtime. Do all this for 7 days. 12 tablet 0    lidocaine 5 % External Patch Place 1 patch onto the skin daily for 10 days. 10 patch 0    predniSONE 20 MG Oral Tab Take 2 tablets (40 mg total) by mouth daily for 5 days. 10 tablet 0    ondansetron 4 MG Oral Tablet Dispersible Take 1 tablet (4 mg total) by mouth every 8 (eight) hours  as needed for Nausea. 6 tablet 0    predniSONE 20 MG Oral Tab Take 20 mg by mouth 2 (two) times daily.      Esomeprazole Magnesium 20 MG Oral Capsule Delayed Release Take 20 mg by mouth every morning before breakfast.      amLODIPine 5 MG Oral Tab Take 5 mg by mouth daily.      isosorbide dinitrate 30 MG Oral Tab Take 30 mg by mouth daily with dinner.      alendronate 70 MG Oral Tab Take 70 mg by mouth once a week. On SUNDAYS only      cyclobenzaprine 10 MG Oral Tab Take 10 mg by mouth 3 (three) times daily as needed for Muscle spasms.      HYDROcodone-acetaminophen 5-325 MG Oral Tab Take 1 tablet by mouth every 6 (six) hours as needed for Pain.      pregabalin 75 MG Oral Cap Take 1 capsule (75 mg total) by mouth daily. 15 capsule 1    NIFEdipine 60 MG Oral Tablet 24 Hr Take 60 mg by mouth 2 (two) times a day.      Vitamin D3 1000 units Oral Tab Take 1,000 Units by mouth daily.      Coenzyme Q10 (COQ10) 100 MG Oral Cap Take by mouth.      Bisoprolol Fumarate (ZEBETA) 5 MG Oral Tab Take 5 mg by mouth 2 (two) times a day.      Levothyroxine Sodium (SYNTHROID, LEVOTHROID) 25 MCG Oral Tab Take 25 mcg by mouth before breakfast.      clopidogrel 75 MG Oral Tab Take 75 mg by mouth daily.         OUTPATIENT MEDICATIONS  No current facility-administered medications on file prior to encounter.     Current Outpatient Medications on File Prior to Encounter   Medication Sig Dispense Refill    ondansetron 4 MG Oral Tablet Dispersible Take 1 tablet (4 mg total) by mouth every 8 (eight) hours as needed for Nausea. 6 tablet 0    predniSONE 20 MG Oral Tab Take 20 mg by mouth 2 (two) times daily.      Esomeprazole Magnesium 20 MG Oral Capsule Delayed Release Take 20 mg by mouth every morning before breakfast.      amLODIPine 5 MG Oral Tab Take 5 mg by mouth daily.      isosorbide dinitrate 30 MG Oral Tab Take 30 mg by mouth daily with dinner.      alendronate 70 MG Oral Tab Take 70 mg by mouth once a week. On SUNDAYS only       cyclobenzaprine 10 MG Oral Tab Take 10 mg by mouth 3 (three) times daily as needed for Muscle spasms.      HYDROcodone-acetaminophen 5-325 MG Oral Tab Take 1 tablet by mouth every 6 (six) hours as needed for Pain.      pregabalin 75 MG Oral Cap Take 1 capsule (75 mg total) by mouth daily. 15 capsule 1    NIFEdipine 60 MG Oral Tablet 24 Hr Take 60 mg by mouth 2 (two) times a day.      Vitamin D3 1000 units Oral Tab Take 1,000 Units by mouth daily.      Coenzyme Q10 (COQ10) 100 MG Oral Cap Take by mouth.      Bisoprolol Fumarate (ZEBETA) 5 MG Oral Tab Take 5 mg by mouth 2 (two) times a day.      Levothyroxine Sodium (SYNTHROID, LEVOTHROID) 25 MCG Oral Tab Take 25 mcg by mouth before breakfast.      clopidogrel 75 MG Oral Tab Take 75 mg by mouth daily.          MEDICAL HISTORY  Past Medical History:   Diagnosis Date    PIERCE (acute kidney injury) ([facility]) 9/23/2021    Arthritis     CAD (coronary artery disease)     High cholesterol     Neuropathy     Retinal pigmentation     Spine fracture 1998    L3, L4, L5.  Rods placed    Thyroid disease     Ulcer     Unspecified essential hypertension        ?SURGICAL HISTORY  Past Surgical History:   Procedure Laterality Date    ANGIOPLASTY (CORONARY)  2012    with stent placement x 1    BACK SURGERY      EXCIS JT LINING,PROX I-P JT,EACH Right 10/14/2015    Procedure: ARTHROTOMY OF FINGER JOINT INTERPHALANGEAL;  Surgeon: Olvin Vences MD;  Location: [facility]    EXCIS JT LINING,PROX I-P JT,EACH Right 10/14/2015    Procedure: ARTHROTOMY OF FINGER JOINT INTERPHALANGEAL;  Surgeon: Olvin Vences MD;  Location: [facility]    OTHER SURGICAL HISTORY  2000,2002, 2004, 2006    back surgery     OTHER SURGICAL HISTORY  1982    \"ulcer surgery\"    OTHER SURGICAL HISTORY Right 10/14/15    right arthrotomy, debridement, synovectomy of the joints w/biopsy right index and long finger MP joint    PATIENT DOCUMENTED NOT TO HAVE EXPERIENCED ANY OF THE FOLLOWING EVENTS Right 10/14/2015     Procedure: ARTHROTOMY OF FINGER JOINT INTERPHALANGEAL;  Surgeon: Olvin Vences MD;  Location: [facility]    PATIENT WITH PREOPERATIVE ORDER FOR IV ANTIBIOTIC SURGICAL SITE INFECT Right 10/14/2015    Procedure: ARTHROTOMY OF FINGER JOINT INTERPHALANGEAL;  Surgeon: Olvin Vences MD;  Location: [facility]       SOCIAL HISTORY  Social History     Socioeconomic History    Marital status:    Tobacco Use    Smoking status: Never    Smokeless tobacco: Never   Vaping Use    Vaping Use: Never used   Substance and Sexual Activity    Alcohol use: No    Drug use: No       FAMILY HISTORY  Family History   Problem Relation Age of Onset    Hypertension Father     Cancer Father         prostate cancer    Hypertension Mother     Diabetes Sister        ALLERGIES  Allergies   Allergen Reactions    Aspirin NAUSEA AND VOMITING    Clavulanic Acid HIVES    Losartan MYALGIA    Penicillins HIVES    Potassium HIVES    Seafood ANAPHYLAXIS    Hydralazine UNKNOWN    Iodine (Topical) UNKNOWN    Pregabalin OTHER (SEE COMMENTS)    Seasonal        ?REVIEW OF SYSTEMS:   13-point review of systems was done and is negative unless otherwise stated in HPI.     ?PHYSICAL EXAM:     /50   Pulse (!) 47   Temp 97.9 °F (36.6 °C)   Resp 14   Ht 65\"   Wt 153 lb (69.4 kg)   SpO2 99%   BMI 25.46 kg/m²   General appearance: Well appearing, and in no acute distress  Skin: skin color, texture normal.  No rashes or lesions.    Head: Normocephalic, atraumatic.    Neurological exam:  Mental Status: Alert, oriented to person, place and time, Follows commands, and Speech fluent and appropriate.  Cranial Nerves: low visual acuity - can only see midline bilaterally, extraocular movements intact, facial sensation intact, face symmetric, no facial droop or ptosis, normal bedside auditory acuity bilaterally, no dysarthria  Motor:   Strength:       Deltoid  C5 Biceps  C5-6  Triceps  C7 Wrist extensors  C7-8 Wrist flexors  C7 Finger flexors  C6-8  Finger extensors   C8 Finger abductors  C8   Thumb abductors  T1    R 5 5 5 5 5 5 5 5 5   L 5 5 5 5 5 5 5 5 5        Hip flexion (Iliopsoas) L2-4  Hip extension   S1 Knee flexion  (Hamstrings)  L5-S1   Knee Extension (Quadriceps)  L3, L4  Ankle plantarflexion  S1 Ankle Dorsiflexion  Tib Anterior   L5   R 4 5 4+ to 5- 5 5 5   L 4- 5 5 5 5 5     Pain with hip flexion bilaterally in hips   Reflexes: Biceps 2+, no reflexes in patellas or ankles   Sensation: intact to light touch  Coordination: Finger-to- nose-finger intact bilaterally   Gait: not assessed       LABS/DATA:    Lab Results   Component Value Date    WBC 7.8 02/25/2024    HGB 12.9 02/25/2024    HCT 40.7 02/25/2024    .0 02/25/2024    CREATSERUM 1.96 02/25/2024    BUN 40 02/25/2024     02/25/2024    K 3.4 02/25/2024     02/25/2024    CO2 22.0 02/25/2024     02/25/2024    CA 9.2 02/25/2024    TSH 1.606 02/25/2024       HGBA1C:  No results found for: \"A1C\", \"EAG\"             IMAGING:  CT BRAIN OR HEAD (09015)    Result Date: 2/25/2024  CONCLUSION: No acute finding    Dictated by (CST): Ken Chapa MD on 2/25/2024 at 10:21 AM     Finalized by (CST): Ken Chapa MD on 2/25/2024 at 10:22 AM          XR LUMBAR SPINE (MIN 2 VIEWS) (CPT=72100)    Result Date: 2/25/2024  CONCLUSION:   No acute fracture or malalignment.  Mild chronic appearing compression deformities T12, L1 and L2.  Postsurgical change lumbar spine as above with moderate lumbar spine degenerative change.    Dictated by (CST): Lokesh Boland MD on 2/25/2024 at 8:26 AM     Finalized by (CST): Lokesh Boland MD on 2/25/2024 at 8:30 AM          XR HIP W OR WO PELVIS 2 OR 3 VIEWS, LEFT (CPT=73502)    Result Date: 2/25/2024  CONCLUSION:   No acute fracture or dislocation.  Mild left hip osteoarthritis.    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  CT brain with no acute findings.  X-ray of lumbar spine with no fractures.  Spinal fluid was obtained in the ER.      His neurological examination is significant for hip flexor and right knee flexion weakness with hip pain and lower extremity areflexia.      Acute lower extremity weakness with areflexia worrisome for Guillain-Barré syndrome vs myasthenia gravis versus spinal shock less likely; pain occurred in his hips after his fall.  Low reflexes could be due to his history of neuropathy as well.    -Follow-up CSF routine analysis   - Follow-up MRI lumbar spine  - Discussed trial of IVIG   -Check CK, aldolase, myasthenia panel  -Depending on clinical course, can do MRI T spine   -PT, OT   -Telemetry     Addendum 1:12 PM CSF worrisome for GBS.  Mild pleocytosis can be seen in GBS and his elevated protein supports this.  Recommend IVIG x 5 days     This note was prepared using Dragon [employer] voice recognition dictation software and as a result, errors may occur. When identified, these errors have been corrected. While every attempt is made to correct errors during dictation, discrepancies may still exist    TERRY Orr DO   Staff Neurologist   2/25/2024  10:51 AM

## 2024-02-26 LAB
ANION GAP SERPL CALC-SCNC: 3 MMOL/L (ref 0–18)
BASOPHILS # BLD AUTO: 0.03 X10(3) UL (ref 0–0.2)
BASOPHILS NFR BLD AUTO: 0.4 %
BUN BLD-MCNC: 39 MG/DL (ref 9–23)
BUN/CREAT SERPL: 20.6 (ref 10–20)
CALCIUM BLD-MCNC: 8.7 MG/DL (ref 8.7–10.4)
CHLORIDE SERPL-SCNC: 114 MMOL/L (ref 98–112)
CO2 SERPL-SCNC: 23 MMOL/L (ref 21–32)
CREAT BLD-MCNC: 1.89 MG/DL
DEPRECATED RDW RBC AUTO: 49.2 FL (ref 35.1–46.3)
EGFRCR SERPLBLD CKD-EPI 2021: 38 ML/MIN/1.73M2 (ref 60–?)
EOSINOPHIL # BLD AUTO: 0 X10(3) UL (ref 0–0.7)
EOSINOPHIL NFR BLD AUTO: 0 %
ERYTHROCYTE [DISTWIDTH] IN BLOOD BY AUTOMATED COUNT: 16.3 % (ref 11–15)
GLUCOSE BLD-MCNC: 133 MG/DL (ref 70–99)
HCT VFR BLD AUTO: 39 %
HGB BLD-MCNC: 12 G/DL
IMM GRANULOCYTES # BLD AUTO: 0.17 X10(3) UL (ref 0–1)
IMM GRANULOCYTES NFR BLD: 2.4 %
LYMPHOCYTES # BLD AUTO: 1.06 X10(3) UL (ref 1–4)
LYMPHOCYTES NFR BLD AUTO: 15.3 %
MCH RBC QN AUTO: 25.6 PG (ref 26–34)
MCHC RBC AUTO-ENTMCNC: 30.8 G/DL (ref 31–37)
MCV RBC AUTO: 83.2 FL
MONOCYTES # BLD AUTO: 0.29 X10(3) UL (ref 0.1–1)
MONOCYTES NFR BLD AUTO: 4.2 %
NEUTROPHILS # BLD AUTO: 5.39 X10 (3) UL (ref 1.5–7.7)
NEUTROPHILS # BLD AUTO: 5.39 X10(3) UL (ref 1.5–7.7)
NEUTROPHILS NFR BLD AUTO: 77.7 %
OSMOLALITY SERPL CALC.SUM OF ELEC: 301 MOSM/KG (ref 275–295)
PLATELET # BLD AUTO: 241 10(3)UL (ref 150–450)
POTASSIUM SERPL-SCNC: 5 MMOL/L (ref 3.5–5.1)
POTASSIUM SERPL-SCNC: 5 MMOL/L (ref 3.5–5.1)
RBC # BLD AUTO: 4.69 X10(6)UL
SODIUM SERPL-SCNC: 140 MMOL/L (ref 136–145)
WBC # BLD AUTO: 6.9 X10(3) UL (ref 4–11)

## 2024-02-26 PROCEDURE — 99233 SBSQ HOSP IP/OBS HIGH 50: CPT | Performed by: HOSPITALIST

## 2024-02-26 NOTE — PLAN OF CARE
Patient is A&Ox4. VSS. RA. PT worked with patient this morning. Up x1 assist and walker. IV Gammagard administered today. Pain managed with norco and IV morphine. Neuro stimulator make and model information was received from [employer] and was relayed to MRI dept. MRI confirmed that MRI of brain/lumbar spine could not be completed due to incompatibility. Dr. Orr was notified. Heparin administered for VTE prophylaxis. Call light is within reach, non skid socks are on, all needs met at this time.   Problem: Patient Centered Care  Goal: Patient preferences are identified and integrated in the patient's plan of care  Description: Interventions:  - What would you like us to know as we care for you?   - Provide timely, complete, and accurate information to patient/family  - Incorporate patient and family knowledge, values, beliefs, and cultural backgrounds into the planning and delivery of care  - Encourage patient/family to participate in care and decision-making at the level they choose  - Honor patient and family perspectives and choices  Outcome: Progressing     Problem: PAIN - ADULT  Goal: Verbalizes/displays adequate comfort level or patient's stated pain goal  Description: INTERVENTIONS:  - Encourage pt to monitor pain and request assistance  - Assess pain using appropriate pain scale  - Administer analgesics based on type and severity of pain and evaluate response  - Implement non-pharmacological measures as appropriate and evaluate response  - Consider cultural and social influences on pain and pain management  - Manage/alleviate anxiety  - Utilize distraction and/or relaxation techniques  - Monitor for opioid side effects  - Notify MD/LIP if interventions unsuccessful or patient reports new pain  - Anticipate increased pain with activity and pre-medicate as appropriate  Outcome: Progressing     Problem: RISK FOR INFECTION - ADULT  Goal: Absence of fever/infection during anticipated neutropenic  period  Description: INTERVENTIONS  - Monitor WBC  - Administer growth factors as ordered  - Implement neutropenic guidelines  Outcome: Progressing     Problem: SAFETY ADULT - FALL  Goal: Free from fall injury  Description: INTERVENTIONS:  - Assess pt frequently for physical needs  - Identify cognitive and physical deficits and behaviors that affect risk of falls.  - [place] fall precautions as indicated by assessment.  - Educate pt/family on patient safety including physical limitations  - Instruct pt to call for assistance with activity based on assessment  - Modify environment to reduce risk of injury  - Provide assistive devices as appropriate  - Consider OT/PT consult to assist with strengthening/mobility  - Encourage toileting schedule  Outcome: Progressing     Problem: DISCHARGE PLANNING  Goal: Discharge to home or other facility with appropriate resources  Description: INTERVENTIONS:  - Identify barriers to discharge w/pt and caregiver  - Include patient/family/discharge partner in discharge planning  - Arrange for needed discharge resources and transportation as appropriate  - Identify discharge learning needs (meds, wound care, etc)  - Arrange for interpreters to assist at discharge as needed  - Consider post-discharge preferences of patient/family/discharge partner  - Complete POLST form as appropriate  - Assess patient's ability to be responsible for managing their own health  - Refer to Case Management Department for coordinating discharge planning if the patient needs post-hospital services based on physician/LIP order or complex needs related to functional status, cognitive ability or social support system  Outcome: Progressing     Problem: Altered Communication/Language Barrier  Goal: Patient/Family is able to understand and participate in their care  Description: Interventions:  - Assess communication ability and preferred communication style  - Implement communication aides and strategies  - Use  visual cues when possible  - Listen attentively, be patient, do not interrupt  - Minimize distractions  - Allow time for understanding and response  - Establish method for patient to ask for assistance (call light)  - Provide an  as needed  - Communicate barriers and strategies to overcome with those who interact with patient  Outcome: Progressing

## 2024-02-26 NOTE — PROGRESS NOTES
Per MRI: \"we will not be able to perform MRI on Maximo until we have information of the device that was seen on his pelvic xray yesterday\"   Dr. Medina's (pain management doctor) office ([phone]) called for information of make/model of patient's neuro stimulator. Buchanan informed me Linda will call me back for information.     1205 per [employer]: [employer]DR[employer] reference #[id number]. Leads reference #[id number] x2 at levels C7 and C1  For further questions call [phone]

## 2024-02-26 NOTE — PROGRESS NOTES
[facility]  Progress Note     Maximo Diggs Jr.  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     10 point ROS completed and was negative, except for pertinent positive and negatives stated in subjective.      Objective:      Temp:  [97.5 °F (36.4 °C)-98.1 °F (36.7 °C)] 97.5 °F (36.4 °C)  Pulse:  [50-56] 53  Resp:  [16-20] 17  BP: (144-197)/(45-61) 146/56  SpO2:  [94 %-100 %] 100 %  General:  Alert, no distress  HEENT:  Normocephalic, atraumatic  Cardiac:  Regular rate, regular rhythm  Pulmonary:  Clear to auscultation bilaterally, respirations unlabored  Gastrointestinal:  Soft, non-tender, normal bowel sounds  Musculoskeletal:  No joint swelling  Extremities:  No edema, no cyanosis, no clubbing  Neurologic:  hip flexion a little better today  Psychiatric:  Normal affect, calm and appropriate    Intake/Output Summary (Last 24 hours) at 2024 1304  Last data filed at 2024 0600  Gross per 24 hour   Intake --   Output 1125 ml   Net -1125 ml         Recent Labs   Lab 24  0938 24  0159   WBC 7.8 6.9   HGB 12.9* 12.0*   HCT 40.7 39.0   .0 241.0   RBC 5.02 4.69   MCV 81.1 83.2   MCH  25.7* 25.6*   MCHC 31.7 30.8*   RDW 16.2* 16.3*   NEPRELIM 5.52 5.39     Recent Labs   Lab 02/25/24  0939 02/26/24  0159   BUN 40* 39*   CREATSERUM 1.96* 1.89*   CA 9.2 8.7    140   K 3.4* 5.0  5.0    114*   CO2 22.0 23.0   * 133*   TSH 1.606  --      --        CT BRAIN OR HEAD (91657)    Result Date: 2/25/2024  CONCLUSION: No acute finding    Dictated by (CST): Ken Chapa MD on 2/25/2024 at 10:21 AM     Finalized by (CST): Ken Chapa MD on 2/25/2024 at 10:22 AM          XR LUMBAR SPINE (MIN 2 VIEWS) (CPT=72100)    Result Date: 2/25/2024  CONCLUSION:   No acute fracture or malalignment.  Mild chronic appearing compression deformities T12, L1 and L2.  Postsurgical change lumbar spine as above with moderate lumbar spine degenerative change.    Dictated by (CST): Lokesh Boland MD on 2/25/2024 at 8:26 AM     Finalized by (CST): Lokesh Boland MD on 2/25/2024 at 8:30 AM          XR HIP W OR WO PELVIS 2 OR 3 VIEWS, LEFT (CPT=73502)    Result Date: 2/25/2024  CONCLUSION:   No acute fracture or dislocation.  Mild left hip osteoarthritis.    Dictated by (CST): Lokesh Boland MD on 2/25/2024 at 8:25 AM     Finalized by (CST): Lokesh Boland MD on 2/25/2024 at 8:26 AM             Medications:   heparin  5,000 Units Subcutaneous 2 times per day    immune globulin  0.4 g/kg Intravenous Daily    amLODIPine  5 mg Oral BID    tamsulosin  0.4 mg Oral Daily @ 0700    clopidogrel  75 mg Oral Daily    pantoprazole  40 mg Oral QAM AC    levothyroxine  25 mcg Oral Before breakfast    predniSONE  10 mg Oral BID    metoprolol tartrate  100 mg Oral BID      PRN Meds: acetaminophen, ondansetron, polyethylene glycol (PEG 3350), sennosides, cyclobenzaprine, HYDROcodone-acetaminophen, labetalol, morphINE **OR** morphINE **OR** morphINE    Supplementary Documentation:        MDM High. Time spent on chart/note review, review of labs/imaging, discussion with patient, physical exam, discussion with  staff, consultants, coordinating care, writing progress note, discussion of plan of care.      Erasmo Armstrong MD

## 2024-02-26 NOTE — PHYSICAL THERAPY NOTE
PHYSICAL THERAPY EVALUATION - INPATIENT     Room Number: 409/409-A  Evaluation Date: 2/26/2024  Type of Evaluation: Initial   Physician Order: PT Eval and Treat    Presenting Problem: strain of lumbar region     Reason for Therapy: Mobility Dysfunction and Discharge Planning    PHYSICAL THERAPY ASSESSMENT   Patient is a 68 year old male admitted 2/25/2024 for lumbar strain, ongoing spine/muscle weakness workup.  Prior to admission, patient's baseline is independent with cane which pt uses primarily due to LOB from low vision status.  Patient is currently functioning below baseline with bed mobility, transfers, gait, and stair negotiation.  Patient is requiring contact guard assist and minimal assist as a result of the following impairments: decreased functional strength, decreased endurance/aerobic capacity, and pain.  Physical Therapy will continue to follow for duration of hospitalization.    Patient will benefit from continued skilled PT Services to promote return to prior level of function and safety with continuous assistance and gradual rehabilitative therapy .    PLAN  PT Treatment Plan: Bed mobility;Body mechanics;Endurance;Energy conservation;Family education;Gait training;Neuromuscular re-educate;Stoop training;Stair training;Transfer training;Balance training  Rehab Potential : Fair  Frequency (Obs): 5x/week    PHYSICAL THERAPY MEDICAL/SOCIAL HISTORY   History related to current admission: LE weakness and falls     Problem List  Principal Problem:    Strain of lumbar region, initial encounter  Active Problems:    Weakness of both lower extremities    Contusion of left hip, initial encounter      HOME SITUATION  Home Situation  Type of Home: House  Home Layout: One level (+ basement with 8 stairs to basement)  Stairs to Enter : 4  Railing: Yes  Lives With: Spouse     SUBJECTIVE  \"I want to walk but my legs are cramping.\"     PHYSICAL THERAPY EXAMINATION   OBJECTIVE  Precautions: Bed/chair alarm;Low vision  (tunnel vision)  Fall Risk: High fall risk    WEIGHT BEARING RESTRICTION  Weight Bearing Restriction: None                PAIN ASSESSMENT     Location: quad and back pain unrated  Management Techniques: Activity promotion;Body mechanics;Breathing techniques;Relaxation;Repositioning    COGNITION  Overall Cognitive Status:  WFL - within functional limits    BALANCE  Static Sitting: Fair +  Dynamic Sitting: Fair +  Static Standing: Fair -  Dynamic Standing: Poor +    AM-PAC '6-Clicks' INPATIENT SHORT FORM - BASIC MOBILITY  How much difficulty does the patient currently have...  Patient Difficulty: Turning over in bed (including adjusting bedclothes, sheets and blankets)?: A Little   Patient Difficulty: Sitting down on and standing up from a chair with arms (e.g., wheelchair, bedside commode, etc.): A Little   Patient Difficulty: Moving from lying on back to sitting on the side of the bed?: A Little   How much help from another person does the patient currently need...   Help from Another: Moving to and from a bed to a chair (including a wheelchair)?: A Little   Help from Another: Need to walk in hospital room?: A Little   Help from Another: Climbing 3-5 steps with a railing?: Total     AM-PAC Score:  Raw Score: 16   Approx Degree of Impairment: 54.16%   Standardized Score (AM-PAC Scale): 40.78   CMS Modifier (G-Code): CK    FUNCTIONAL ABILITY STATUS  Functional Mobility/Gait Assessment  Gait Assistance: Minimum assistance  Distance (ft): 15'  Assistive Device: Rolling walker  Pattern: Shuffle  Rolling: minimal assist  Supine to Sit: minimal assist  Sit to Supine: minimal assist  Sit to Stand: minimal assist    Exercise/Education Provided:  Bed mobility  Body mechanics  Functional activity tolerated  Gait training  Transfer training    The patient's Approx Degree of Impairment: 54.16% has been calculated based on documentation in the [facility] '6 clicks' Inpatient Basic Mobility Short Form.  Research supports that patients  with this level of impairment may benefit from KRISTI.  Final disposition will be made by interdisciplinary medical team.    Patient End of Session: Up in chair;Needs met;Call light within reach;RN aware of session/findings;All patient questions and concerns addressed    CURRENT GOALS  Goals to be met by: 3/15  Patient Goal Patient's self-stated goal is: unstated   Goal #1 Patient is able to demonstrate supine - sit EOB @ level: supervision     Goal #1   Current Status    Goal #2 Patient is able to demonstrate transfers Sit to/from Stand at assistance level: supervision with walker - rolling     Goal #2  Current Status    Goal #3 Patient is able to ambulate 150 feet with assist device: walker - rolling at assistance level: supervision   Goal #3   Current Status    Goal #4 Patient will negotiate 4 stairs/one curb w/ assistive device and supervision   Goal #4   Current Status    Goal #5 Patient to demonstrate independence with home activity/exercise instructions provided to patient in preparation for discharge.   Goal #5   Current Status    Goal #6    Goal #6  Current Status      Patient Evaluation Complexity Level:  History Moderate - 1 or 2 personal factors and/or co-morbidities   Examination of body systems Moderate - addressing a total of 3 or more elements   Clinical Presentation  Moderate - Evolving   Clinical Decision Making  Moderate Complexity     Gait Trainin minutes

## 2024-02-26 NOTE — OCCUPATIONAL THERAPY NOTE
OCCUPATIONAL THERAPY EVALUATION - INPATIENT     Room Number: 409/409-A  Evaluation Date: 2/26/2024  Type of Evaluation: Initial       Physician Order: IP Consult to Occupational Therapy  Reason for Therapy: ADL/IADL Dysfunction and Discharge Planning    OCCUPATIONAL THERAPY ASSESSMENT     Patient is a 68 year old male admitted 2/25/2024 for fall; BLE weakness.  Prior to admission, pt was independent.  Patient is currently requiring up to CG A for ADLs overall along with sup for supine to sit, sup for sitting at EOB, min for STS, and min A for functional transfer at RW level. Pt tolerated about 1 minutes of standing in supported standing.  Pt has the following impairments: decreased functional strength, decreased endurance, and pain.    RN cleared pt for participation in OT session, which was completed in collaboration with PT. Upon arrival, pt was supine in bed and agreeable to activity. No visitors present during session. Gait belt used. Pt was left in chair.Call light and all needs left in reach. Handoff given to RN.    Education provided  Educated pt about role of OT and hospital therapy process as well as proper safety techniques with use of RW. Pt verbalized/demonstrated fair carryover.    Patient will benefit from continued skilled OT Services at discharge to promote functional independence in home.  Anticipate patient will return home with home health OT    PLAN  OT Treatment Plan: Balance activities;Energy conservation/work simplification techniques;Continued evaluation;Compensatory technique education;Fine motor coordination activities;Neuromuscluar reeducation;Equipment eval/education;Patient/Family training;Patient/Family education;Cognitive reorientation;Endurance training;UE strengthening/ROM;Functional transfer training;Visual perceptual training;IADL training;ADL training      OCCUPATIONAL THERAPY MEDICAL/SOCIAL HISTORY     Problem List  Principal Problem:    Strain of lumbar region, initial  encounter  Active Problems:    Weakness of both lower extremities    Contusion of left hip, initial encounter      Past Medical History  Past Medical History:   Diagnosis Date    PIERCE (acute kidney injury) ([facility]) 9/23/2021    Arthritis     CAD (coronary artery disease)     High cholesterol     Neuropathy     Retinal pigmentation     Spine fracture 1998    L3, L4, L5.  Rods placed    Thyroid disease     Ulcer     Unspecified essential hypertension        Past Surgical History  Past Surgical History:   Procedure Laterality Date    ANGIOPLASTY (CORONARY)  2012    with stent placement x 1    BACK SURGERY      EXCIS JT LINING,PROX I-P JT,EACH Right 10/14/2015    Procedure: ARTHROTOMY OF FINGER JOINT INTERPHALANGEAL;  Surgeon: Olvin Vences MD;  Location: [facility]    EXCIS JT LINING,PROX I-P JT,EACH Right 10/14/2015    Procedure: ARTHROTOMY OF FINGER JOINT INTERPHALANGEAL;  Surgeon: Olvin Vences MD;  Location: [facility]    OTHER SURGICAL HISTORY  2000,2002, 2004, 2006    back surgery     OTHER SURGICAL HISTORY  1982    \"ulcer surgery\"    OTHER SURGICAL HISTORY Right 10/14/15    right arthrotomy, debridement, synovectomy of the joints w/biopsy right index and long finger MP joint    PATIENT DOCUMENTED NOT TO HAVE EXPERIENCED ANY OF THE FOLLOWING EVENTS Right 10/14/2015    Procedure: ARTHROTOMY OF FINGER JOINT INTERPHALANGEAL;  Surgeon: Olvin Vences MD;  Location: [facility]    PATIENT WITH PREOPERATIVE ORDER FOR IV ANTIBIOTIC SURGICAL SITE INFECT Right 10/14/2015    Procedure: ARTHROTOMY OF FINGER JOINT INTERPHALANGEAL;  Surgeon: Olvin Vences MD;  Location: [facility]       HOME SITUATION  Type of Home: House  Home Layout: One level (+ basement with 8 stairs to basement)  Lives With: Spouse                              SUBJECTIVE  \"My legs are weak but I wanna do it.\"    OCCUPATIONAL THERAPY EXAMINATION      OBJECTIVE  Precautions: Bed/chair alarm;Low vision (tunnel vision)  Fall Risk: High fall  risk    PAIN ASSESSMENT  Rating: Unable to rate  Location: low back  Management Techniques: Heat       RANGE OF MOTION   Upper extremity ROM is within functional limits     STRENGTH ASSESSMENT  Upper extremity strength is within functional limits     COORDINATION  Gross Motor: WFL   Fine Motor: WFL     ACTIVITIES OF DAILY LIVING ASSESSMENT  AM-PAC ‘6-Clicks’ Inpatient Daily Activity Short Form  How much help from another person does the patient currently need…  -   Putting on and taking off regular lower body clothing?: A Little  -   Bathing (including washing, rinsing, drying)?: A Little  -   Toileting, which includes using toilet, bedpan or urinal? : A Little  -   Putting on and taking off regular upper body clothing?: A Little  -   Taking care of personal grooming such as brushing teeth?: A Little  -   Eating meals?: None    AM-PAC Score:  Score: 19  Approx Degree of Impairment: 42.8%  Standardized Score (AM-PAC Scale): 40.22  CMS Modifier (G-Code): CK      BED MOBILITY  supervision       FUNCTIONAL TRANSFER ASSESSMENT        Sit to stand: min A  Toilet Transfer: min A  Chair Transfer: min A    FUNCTIONAL ADL ASSESSMENT  Overall setup/CGA    The patient's Approx Degree of Impairment: 42.8% has been calculated based on documentation in the [facility] '6 clicks' Inpatient Daily Activity Short Form.  Research supports that patients with this level of impairment may benefit from home health. Final disposition will be made by interdisciplinary medical team.    OT Goals  Patients self stated goal is: to go home     Patient will complete functional transfer with mod I  Comment:     Patient will complete toileting with mod I  Comment:     Patient will tolerate standing for 3 minutes in prep for adls with mod I   Comment:    Patient will complete item retrieval with mod I  Comment:          Goals  on: 3/26  Frequency: 3-5x/wk    Patient Evaluation Complexity Level:   Occupational Profile/Medical History MODERATE -  Expanded review of history including review of medical or therapy record   Specific performance deficits impacting engagement in ADL/IADL MODERATE  3 - 5 performance deficits   Client Assessment/Performance Deficits MODERATE - Comorbidities and min to mod modifications of tasks    Clinical Decision Making MODERATE - Analysis of occupational profile, detailed assessments, several treatment options    Overall Complexity MODERATE     OT Session Time: 30 minutes  Self-Care Home Management: 15 minutes           Jade Rios OT  [facility]  Inpatient Rehabilitation  Occupational Therapy  [phone]

## 2024-02-26 NOTE — PROGRESS NOTES
History of Presenting Illness:    Patient seen in detail.  States that his strength in lower extremities improving.  Has no numbness, paresthesias or dysesthesias.  Denies any bladder or bowel incontinence.  Patient states that since steroids have been started symptoms have improved.  No new symptoms in upper extremity, diplopia dysarthria dysphagia or facial weakness.    Vitals:   Vitals:    02/26/24 1610   BP: (!) 172/65   Pulse: 75   Resp: 18   Temp:         Examination:    Awake , alert, non-dysarthric, expressive an receptive language normal.   Pupils round and reactive to light, extra ocular movement normal, no facial weakness, hearing normal.  Motor strength and reflexes symmetrical normal in upper extremities and slightly decreased in lower extremities.  Patient is able to bear weight.  Finger to Nose testing normal.     Investigations:    CT BRAIN OR HEAD (01931)    Result Date: 2/25/2024  CONCLUSION: No acute finding    Dictated by (CST): Ken Chapa MD on 2/25/2024 at 10:21 AM     Finalized by (CST): Ken Chapa MD on 2/25/2024 at 10:22 AM          XR LUMBAR SPINE (MIN 2 VIEWS) (CPT=72100)    Result Date: 2/25/2024  CONCLUSION:   No acute fracture or malalignment.  Mild chronic appearing compression deformities T12, L1 and L2.  Postsurgical change lumbar spine as above with moderate lumbar spine degenerative change.    Dictated by (CST): Lokesh Boland MD on 2/25/2024 at 8:26 AM     Finalized by (CST): Lokesh Boland MD on 2/25/2024 at 8:30 AM          XR HIP W OR WO PELVIS 2 OR 3 VIEWS, LEFT (CPT=73502)    Result Date: 2/25/2024  CONCLUSION:   No acute fracture or dislocation.  Mild left hip osteoarthritis.    Dictated by (CST): Lokesh Boland MD on 2/25/2024 at 8:25 AM     Finalized by (CST): Lokesh Boland MD on 2/25/2024 at 8:26 AM             No results found for: \"A1C\"     No results found for: \"LDL\", \"HDL\", \"TRIG\"     Recent Labs   Lab 02/25/24  0938 02/26/24  0159   RBC 5.02 4.69    HGB 12.9* 12.0*   HCT 40.7 39.0   MCV 81.1 83.2   MCH 25.7* 25.6*   MCHC 31.7 30.8*   RDW 16.2* 16.3*   NEPRELIM 5.52 5.39   WBC 7.8 6.9   .0 241.0       Recent Labs   Lab 02/25/24  0939 02/26/24  0159   * 133*   BUN 40* 39*   CREATSERUM 1.96* 1.89*   EGFRCR 37* 38*   CA 9.2 8.7    140   K 3.4* 5.0  5.0    114*   CO2 22.0 23.0         Impression/MDM.    Concern for Guillain-Barré syndrome.  CSF studies showed borderline elevated protein.  Myasthenia gravis profile is pending.  Creatinine kinase was normal.  Patient unable to get MRI of the lumbosacral spine due to stimulator.  Patient was counseled regarding the use of IVIG for 5 days.  Benefits and side effects were discussed with patient.  Patient feels that she is improving at this time.  He wants to discuss this further with his wife before agreeing to the medication.  All questions answered.  Will follow  Lower extremity weakness.  Patient currently improving with steroids in tapering doses.  Also advised OT/PT/rehab.

## 2024-02-27 ENCOUNTER — APPOINTMENT (OUTPATIENT)
Dept: PICC SERVICES | Facility: HOSPITAL | Age: 69
End: 2024-02-27
Attending: HOSPITALIST
Payer: MEDICARE

## 2024-02-27 PROBLEM — G61.0 GBS (GUILLAIN BARRE SYNDROME) (HCC): Status: ACTIVE | Noted: 2024-02-27

## 2024-02-27 LAB
ALDOLASE: 5.2 U/L
ANION GAP SERPL CALC-SCNC: 6 MMOL/L (ref 0–18)
BUN BLD-MCNC: 39 MG/DL (ref 9–23)
BUN/CREAT SERPL: 22.4 (ref 10–20)
CALCIUM BLD-MCNC: 9.2 MG/DL (ref 8.7–10.4)
CHLORIDE SERPL-SCNC: 108 MMOL/L (ref 98–112)
CO2 SERPL-SCNC: 22 MMOL/L (ref 21–32)
CREAT BLD-MCNC: 1.74 MG/DL
DEPRECATED RDW RBC AUTO: 47.5 FL (ref 35.1–46.3)
EGFRCR SERPLBLD CKD-EPI 2021: 42 ML/MIN/1.73M2 (ref 60–?)
ERYTHROCYTE [DISTWIDTH] IN BLOOD BY AUTOMATED COUNT: 16.4 % (ref 11–15)
GLUCOSE BLD-MCNC: 127 MG/DL (ref 70–99)
HCT VFR BLD AUTO: 37.1 %
HGB BLD-MCNC: 12.4 G/DL
MCH RBC QN AUTO: 27 PG (ref 26–34)
MCHC RBC AUTO-ENTMCNC: 33.4 G/DL (ref 31–37)
MCV RBC AUTO: 80.8 FL
OSMOLALITY SERPL CALC.SUM OF ELEC: 293 MOSM/KG (ref 275–295)
PLATELET # BLD AUTO: 240 10(3)UL (ref 150–450)
POTASSIUM SERPL-SCNC: 4.5 MMOL/L (ref 3.5–5.1)
RBC # BLD AUTO: 4.59 X10(6)UL
SODIUM SERPL-SCNC: 136 MMOL/L (ref 136–145)
WBC # BLD AUTO: 9.1 X10(3) UL (ref 4–11)

## 2024-02-27 PROCEDURE — 99233 SBSQ HOSP IP/OBS HIGH 50: CPT | Performed by: HOSPITALIST

## 2024-02-27 RX ORDER — HYDROCODONE BITARTRATE AND ACETAMINOPHEN 5; 325 MG/1; MG/1
1 TABLET ORAL EVERY 4 HOURS PRN
Status: DISCONTINUED | OUTPATIENT
Start: 2024-02-27 | End: 2024-03-01

## 2024-02-27 NOTE — PROGRESS NOTES
[facility]  [address] [place] [address], [address]  [facility] [address]  [phone]            Maximo Diggs Jr. Patient Status:  Inpatient    1955 MRN [id number]   Location [facility] 4W/SW/SE Attending Erasmo Armstrong MD   Hosp Day # 2 PCP Lito Pedroza MD     Subjective:  Maximo Diggs Jr. is a(n) 68 year old male.    Hospital course to date: Patient apparently presented to the hospital with mechanical fall complaining of left hip and lower back pain.  Couple weeks prior he was diagnosed that his legs are getting weaker.  No provoking illness preceding, no waxing preceding event.  He was found to be areflexic.  CSF was obtained, protein was elevated.  Guillain-Barré syndrome was entertained.  IVIG was started.  Some improvement of the strength was noticed by the next couple days, however continued significant hip pain.    Current Facility-Administered Medications   Medication Dose Route Frequency    HYDROcodone-acetaminophen (Norco) 5-325 MG per tab 1 tablet  1 tablet Oral Q4H PRN    heparin (Porcine) 5000 UNIT/ML injection 5,000 Units  5,000 Units Subcutaneous 2 times per day    acetaminophen (Tylenol Extra Strength) tab 500 mg  500 mg Oral Q4H PRN    ondansetron (Zofran) 4 MG/2ML injection 4 mg  4 mg Intravenous Q6H PRN    polyethylene glycol (PEG 3350) (Miralax) 17 g oral packet 17 g  17 g Oral Daily PRN    sennosides (Senokot) tab 17.2 mg  17.2 mg Oral Nightly PRN    immune globulin (Gammagard) 10% infusion 30 g  0.4 g/kg Intravenous Daily    amLODIPine (Norvasc) tab 5 mg  5 mg Oral BID    tamsulosin (Flomax) cap 0.4 mg  0.4 mg Oral Daily @ 0700    clopidogrel (Plavix) tab 75 mg  75 mg Oral Daily    cyclobenzaprine (Flexeril) tab 10 mg  10 mg Oral TID PRN    pantoprazole (Protonix) DR tab 40 mg  40 mg Oral QAM AC    levothyroxine (Synthroid) tab 25 mcg  25 mcg Oral Before breakfast    predniSONE (Deltasone) tab 10 mg  10 mg Oral BID    labetalol (Trandate) 5  mg/mL injection 10 mg  10 mg Intravenous Q4H PRN    morphINE PF 2 MG/ML injection 1 mg  1 mg Intravenous Q2H PRN    Or    morphINE PF 2 MG/ML injection 2 mg  2 mg Intravenous Q2H PRN    Or    morphINE PF 4 MG/ML injection 4 mg  4 mg Intravenous Q2H PRN    metoprolol tartrate (Lopressor) tab 100 mg  100 mg Oral BID       Objective:  Blood pressure 154/50, pulse 71, temperature 98.3 °F (36.8 °C), temperature source Oral, resp. rate 18, height 65\", weight 153 lb (69.4 kg), SpO2 98%.    Physical Exam:  Vitals:    02/27/24 0824 02/27/24 0949 02/27/24 1202 02/27/24 1412   BP: (!) 172/60 154/58 (!) 179/57 154/50   Pulse: 55  66 71   Resp: 18  18 18   Temp: 97.8 °F (36.6 °C)  97.8 °F (36.6 °C) 98.3 °F (36.8 °C)   TempSrc: Oral  Oral Oral   SpO2: 99%  98% 98%   Weight:       Height:           General: No apparent distress, well nourished, well groomed.  Head- Normocephalic, atraumatic  Eyes- No redness or swelling  Neck- No masses or adenopathy  CV: pulses were palpable and normal, no cyanosis or edema     Neurological:     Mental Status- Alert and oriented x3.  Normal attention span and concentration  Thought process intact  Memory intact- recent and remote  Mood intact  Fund of knowledge appropriate for education and age    Language intact including: comprehension, naming, repetition, vocabulary    Cranial Nerves:    VII. Face symmetric, no facial weakness  VIII. Hearing intact to whisper, tuning fork or finger rub.  IX. Pallet elevates symmetrically.  XI. Shoulder shrug is intact  XII. Tongue is midline    Motor Exam:  Muscle tone normal  No atrophy or fasciculations  Strength- upper extremities 5/5 proximally and distally                  - lower  extremities 5/5 proximally and distally    Sensory Exam:  Light touch sensation- intact in all 4 extremities    Deep Tendon Reflexes:    Patellar absent bilaterally  Ankle jerk 1+ bilateral symmetric    No clonus  No Babinski sign    Coordination:  Finger to nose intact  Rapid  alternating movements intact      Lab Results   Component Value Date    WBC 9.1 02/27/2024    HGB 12.4 (L) 02/27/2024    HCT 37.1 (L) 02/27/2024    .0 02/27/2024    CREATSERUM 1.74 (H) 02/27/2024    BUN 39 (H) 02/27/2024     02/27/2024    K 4.5 02/27/2024     02/27/2024    CO2 22.0 02/27/2024     (H) 02/27/2024    CA 9.2 02/27/2024    ALB 4.2 12/05/2023    ALKPHO 34 (L) 12/05/2023    BILT 0.4 12/05/2023    TP 7.0 12/05/2023    AST 35 (H) 12/05/2023    ALT 42 12/05/2023    TSH 1.606 02/25/2024     11/15/2021    DDIMER 0.65 (H) 11/15/2021    ESRML 53 (H) 09/23/2021    CRP 0.50 (H) 09/23/2021    PHOS 2.5 09/24/2021    TROP <0.045 11/15/2021     02/25/2024    B12 527 02/08/2017       No results found.          Assessment:  Patient Active Problem List   Diagnosis    ESR raised    Cramps, extremity    Enthesopathy of wrist and carpus    Calcium pyrophosphate arthropathy    Stiffness of finger joint, right    SX/GLOBAL/  /SCSC/ARTHROTOMY DEBRIDEMENT AND BIOPSY FOR CRYSTALS RIGHT INDEX AND LONG FINGERS MP / DOS 10-14-15 / EXP 1-12-16    Chondrocostal junction syndrome (tietze)    Anterior chest wall pain    Trigger finger, right middle finger    Complex regional pain syndrome type 1 of left upper extremity    Left arm pain    PIERCE (acute kidney injury) (HCC)    Acute nonintractable headache, unspecified headache type    Strain of lumbar region, initial encounter    Weakness of both lower extremities    Contusion of left hip, initial encounter     Patient with presumed Guillain-Barré syndrome, there was elevation of protein but relatively borderline.  However also some reappearance of reflexes at the ankles with 3-day treatment of IVIG.  2 more days remaining.  Also history of temporal arteritis in the past couple years back, he is doing a tapering dose of steroids.      Vj Marc MD  2/27/2024  2:45 PM

## 2024-02-27 NOTE — PROGRESS NOTES
[facility]  Progress Note     Maximo Diggs Jr.  : 1955    Status: Inpatient  Day #: 2    Attending: Erasmo Armstrong MD  PCP: Lito Pedroza MD     Assessment and Plan:    B/l LE weakness with falls  -suspected Guillain-[place]  -myasthenia panel pending  -CT brain ok  -XR lumbar spine no fracture  -neurology consulted  -s/p LP  -MRI lumbar spine pending  -IVIG started (day 3 of 5)  -LE strength improving     H/o temporal arteritis  -cont prednisone taper (was on outpatient)     Back and hip pain  -norco, morphine IV prn  -improved     HTN urgency  -cont amlodipine, metoprolol  -IV med prn     Other:  -CAD with h/o distant PCI/stent  -neuropathy  -HL  -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     10 point ROS completed and was negative, except for pertinent positive and negatives stated in subjective.      Objective:      Temp:  [97.5 °F (36.4 °C)-97.8 °F (36.6 °C)] 97.8 °F (36.6 °C)  Pulse:  [55-75] 66  Resp:  [16-18] 18  BP: (154-196)/(45-65) 179/57  SpO2:  [98 %-100 %] 98 %  General:  Alert, no distress  HEENT:  Normocephalic, atraumatic  Cardiac:  Regular rate, regular rhythm  Pulmonary:  Clear to auscultation bilaterally, respirations unlabored  Gastrointestinal:  Soft, non-tender, normal bowel sounds  Musculoskeletal:  No joint swelling  Extremities:  No edema, no cyanosis, no clubbing  Neurologic:  LE strength improving  Psychiatric:  Normal affect, calm and appropriate  No intake or output data in the 24 hours ending 24 1244        Recent Labs   Lab 24  0938 24  0159 24  0507   WBC 7.8 6.9 9.1   HGB 12.9* 12.0* 12.4*   HCT 40.7 39.0 37.1*   .0 241.0 240.0   RBC 5.02  4.69 4.59   MCV 81.1 83.2 80.8   MCH 25.7* 25.6* 27.0   MCHC 31.7 30.8* 33.4   RDW 16.2* 16.3* 16.4*   NEPRELIM 5.52 5.39  --      Recent Labs   Lab 02/25/24  0939 02/26/24  0159 02/27/24  0507   BUN 40* 39* 39*   CREATSERUM 1.96* 1.89* 1.74*   CA 9.2 8.7 9.2    140 136   K 3.4* 5.0  5.0 4.5    114* 108   CO2 22.0 23.0 22.0   * 133* 127*   TSH 1.606  --   --      --   --        No results found.      Medications:   heparin  5,000 Units Subcutaneous 2 times per day    immune globulin  0.4 g/kg Intravenous Daily    amLODIPine  5 mg Oral BID    tamsulosin  0.4 mg Oral Daily @ 0700    clopidogrel  75 mg Oral Daily    pantoprazole  40 mg Oral QAM AC    levothyroxine  25 mcg Oral Before breakfast    predniSONE  10 mg Oral BID    metoprolol tartrate  100 mg Oral BID      PRN Meds: acetaminophen, ondansetron, polyethylene glycol (PEG 3350), sennosides, cyclobenzaprine, HYDROcodone-acetaminophen, labetalol, morphINE **OR** morphINE **OR** morphINE    Supplementary Documentation:        [facility]. Time spent on chart/note review, review of labs/imaging, discussion with patient, physical exam, discussion with staff, consultants, coordinating care, writing progress note, discussion of plan of care.      Erasmo Armstrong MD

## 2024-02-27 NOTE — PHYSICAL THERAPY NOTE
Pt was seen resting in bed and declined participation with therapy due to c/o 10/10 pain at this time. Encouragement education provided regarding importance of frequent movement while hospitalized; pt verbalized understanding. Will follow up at later date as appropriate and able. RN was made aware.

## 2024-02-28 PROBLEM — I1A.0 RESISTANT HYPERTENSION: Status: ACTIVE | Noted: 2024-02-28

## 2024-02-28 LAB
BILIRUB UR QL: NEGATIVE
CLARITY UR: CLEAR
GLUCOSE UR-MCNC: NORMAL MG/DL
HGB UR QL STRIP.AUTO: NEGATIVE
KETONES UR-MCNC: NEGATIVE MG/DL
LEUKOCYTE ESTERASE UR QL STRIP.AUTO: NEGATIVE
NITRITE UR QL STRIP.AUTO: NEGATIVE
PH UR: 5.5 [PH] (ref 5–8)
PROT UR-MCNC: 30 MG/DL
SODIUM SERPL-SCNC: 140 MMOL/L
SP GR UR STRIP: 1.02 (ref 1–1.03)
UROBILINOGEN UR STRIP-ACNC: NORMAL

## 2024-02-28 PROCEDURE — 99233 SBSQ HOSP IP/OBS HIGH 50: CPT | Performed by: HOSPITALIST

## 2024-02-28 PROCEDURE — 99223 1ST HOSP IP/OBS HIGH 75: CPT | Performed by: INTERNAL MEDICINE

## 2024-02-28 RX ORDER — HYDRALAZINE HYDROCHLORIDE 25 MG/1
100 TABLET, FILM COATED ORAL EVERY 8 HOURS SCHEDULED
Status: DISCONTINUED | OUTPATIENT
Start: 2024-02-28 | End: 2024-03-01

## 2024-02-28 RX ORDER — HYDRALAZINE HYDROCHLORIDE 10 MG/1
10 TABLET, FILM COATED ORAL EVERY 6 HOURS SCHEDULED
Status: DISCONTINUED | OUTPATIENT
Start: 2024-02-28 | End: 2024-02-28

## 2024-02-28 RX ORDER — HYDRALAZINE HYDROCHLORIDE 50 MG/1
50 TABLET, FILM COATED ORAL EVERY 8 HOURS SCHEDULED
Status: DISCONTINUED | OUTPATIENT
Start: 2024-02-28 | End: 2024-02-28

## 2024-02-28 RX ORDER — HYDRALAZINE HYDROCHLORIDE 25 MG/1
50 TABLET, FILM COATED ORAL ONCE
Status: COMPLETED | OUTPATIENT
Start: 2024-02-28 | End: 2024-02-28

## 2024-02-28 RX ORDER — HYDRALAZINE HYDROCHLORIDE 20 MG/ML
10 INJECTION INTRAMUSCULAR; INTRAVENOUS EVERY 6 HOURS PRN
Status: DISCONTINUED | OUTPATIENT
Start: 2024-02-28 | End: 2024-03-01

## 2024-02-28 NOTE — PROGRESS NOTES
[facility]    Progress Note    Maximo Diggs Jr. Patient Status:  Inpatient    1955 MRN [id number]   Location [facility] 4W/SW/SE Attending Sampson Rob MD   Hosp Day # 3 PCP Lito Pedrzoa MD       Subjective:     Pt notes back pain with movement.  He feels his legs are stronger.  BP has been elevated.    Objective:   Blood pressure (!) 170/66, pulse 86, temperature 97.5 °F (36.4 °C), temperature source Oral, resp. rate 18, height 5' 5\" (1.651 m), weight 153 lb (69.4 kg), SpO2 99%.    Gen:   NAD.  A and O x 3  CV:   RRR, no m/g/r  Pulm:   CTA bilat  Abd:   +bs, soft, NT, ND  LE:   No c/c/e  Neuro:   nonfocal    Results:     Lab Results   Component Value Date    WBC 9.1 2024    HGB 12.4 (L) 2024    HCT 37.1 (L) 2024    .0 2024    CREATSERUM 1.74 (H) 2024    BUN 39 (H) 2024     2024    K 4.5 2024     2024    CO2 22.0 2024     (H) 2024    CA 9.2 2024    ALB 4.2 2023    ALKPHO 34 (L) 2023    BILT 0.4 2023    TP 7.0 2023    AST 35 (H) 2023    ALT 42 2023    TSH 1.606 2024     11/15/2021    DDIMER 0.65 (H) 11/15/2021    ESRML 53 (H) 2021    CRP 0.50 (H) 2021    PHOS 2.5 2021    TROP <0.045 11/15/2021     2024    B12 527 2017       No results found.        Assessment and Plan:     Bilateral LE weakness with falls  - suspected Guillain-[place]  - myasthenia panel pending  - CT brain ok  - XR lumbar spine no fracture  - neurology on consult  - s/p LP  - MRI lumbar spine cannot be done due to pt's nerve stimulator  - Cont IVIG started (day 4 of 5)  - LE strength improving    Persistent HTN  Hypertensive urgency  - renal consult  - PO hydralazine added  - cont IV prn hydralazine and IV prn labetalol  - cont po norvasc bid, po metoprolol bid        H/o temporal arteritis  - cont prednisone taper (was on  outpatient)     Back and hip pain  - norco, morphine IV prn  - improved     Other:  - CAD with h/o distant PCI/stent  - neuropathy  - HL  - 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

## 2024-02-28 NOTE — PHYSICAL THERAPY NOTE
PHYSICAL THERAPY TREATMENT NOTE - INPATIENT     Room Number: 409/409-A       Presenting Problem: strain of lumbar region       Problem List  Principal Problem:    Strain of lumbar region, initial encounter  Active Problems:    Weakness of both lower extremities    Contusion of left hip, initial encounter    GBS (Guillain [place] syndrome) ([facility])    Resistant hypertension      PHYSICAL THERAPY ASSESSMENT   Patient demonstrates good  progress this session, goals  progressing with bed mobility, transfers and ambulation this session, but all goals remain in progress.    Patient continues to function below baseline with bed mobility, transfers, gait, and stair negotiation, requiring CGA and Min A for mobility, but also requiring seated rest breaks while ambulating household distances 2/2 pain in BLE.  Contributing factors to remaining limitations include decreased functional strength, pain, impaired static and dynamic seated and standing balance, decreased muscular endurance, and medical status.  Next session anticipate patient to progress bed mobility, transfers, gait, and stair negotiation.  Physical Therapy will continue to follow patient for duration of hospitalization.    Patient continues to benefit from continued skilled PT services: to promote return to prior level of function and safety with continuous assistance and gradual rehabilitative therapy .    PLAN  PT Treatment Plan: Bed mobility;Body mechanics;Endurance;Energy conservation;Family education;Gait training;Neuromuscular re-educate;Stoop training;Stair training;Transfer training;Balance training  Frequency (Obs): 5x/week    SUBJECTIVE  I want to do it    OBJECTIVE  Precautions: Bed/chair alarm;Low vision (tunnel vision)        PAIN ASSESSMENT   Rating:  (did not quantify)  Location: BLE (L > R, LUE)  Management Techniques: Activity promotion;Breathing techniques;Body mechanics    BALANCE  Static Sitting: Fair +  Dynamic Sitting: Fair +  Static Standing:  Fair  Dynamic Standing: Poor +    ACTIVITY TOLERANCE  Pulse: 67  Heart Rate Source: Monitor     BP: (!) 188/58  BP Location: Left arm  BP Method: Automatic  Patient Position: Semi-Mason     O2 WALK  Oxygen Therapy  SPO2% on Room Air at Rest: 100    AM-PAC '6-Clicks' INPATIENT SHORT FORM - BASIC MOBILITY  How much difficulty does the patient currently have...  Patient Difficulty: Turning over in bed (including adjusting bedclothes, sheets and blankets)?: A Little   Patient Difficulty: Sitting down on and standing up from a chair with arms (e.g., wheelchair, bedside commode, etc.): A Little   Patient Difficulty: Moving from lying on back to sitting on the side of the bed?: A Little   How much help from another person does the patient currently need...   Help from Another: Moving to and from a bed to a chair (including a wheelchair)?: A Little   Help from Another: Need to walk in hospital room?: A Little   Help from Another: Climbing 3-5 steps with a railing?: A Lot     AM-PAC Score:  Raw Score: 17   Approx Degree of Impairment: 50.57%   Standardized Score (AM-PAC Scale): 42.13   CMS Modifier (G-Code): CK    FUNCTIONAL ABILITY STATUS  Functional Mobility/Gait Assessment  Gait Assistance: Minimum assistance  Distance (ft): 50 ft x2  Assistive Device: Rolling walker  Pattern: Shuffle (frequent standing rest breaks and 1 seated rest break 2/2 BLE pain)  Rolling:  NT  Supine to Sit: stand-by assist  Sit to Supine:  NT  Sit to Stand: contact guard assist with RW    Additional information: Pt agreeable to therapy, identified by name and , gait belt donned for mobility. Vitals monitored during session, see values above. Pt reporting pain in BLE (L>R) and in LUE during ambulation, states the pain increases the longer the distance, noting spasms and intense pain requiring standing and seated rest breaks. Pt with difficulty ambulating household distance without rest breaks 2/2 pain.PT ed provided on importance of pacing to  reduce fatigue and pain. Pt up in chair at end of session with needs and call light in reach.     The patient's Approx Degree of Impairment: 50.57% has been calculated based on documentation in the [facility] '6 clicks' Inpatient Daily Activity Short Form.  Research supports that patients with this level of impairment may benefit from rehab facility.  Final disposition will be made by interdisciplinary medical team.        Patient End of Session: Up in chair;Needs met;Call light within reach;RN aware of session/findings    CURRENT GOALS   Goals to be met by: 3/15  Patient Goal Patient's self-stated goal is: unstated   Goal #1 Patient is able to demonstrate supine - sit EOB @ level: supervision      Goal #1   Current Status SBA   Goal #2 Patient is able to demonstrate transfers Sit to/from Stand at assistance level: supervision with walker - rolling      Goal #2  Current Status  CGA with RW    Goal #3 Patient is able to ambulate 150 feet with assist device: walker - rolling at assistance level: supervision   Goal #3   Current Status  50 ft x2 with Min A, frequent rest breaks    Goal #4 Patient will negotiate 4 stairs/one curb w/ assistive device and supervision   Goal #4   Current Status  NT   Goal #5 Patient to demonstrate independence with home activity/exercise instructions provided to patient in preparation for discharge.   Goal #5   Current Status  ongoing    Goal #6     Goal #6  Current Status       Gait Training: 15 minutes  Therapeutic Activity: 10 minutes

## 2024-02-28 NOTE — PLAN OF CARE
Patient is A&Ox4. VSS. RA. Up x1 assist and walker. IV Gammagard administered today, day 3 of 5. Pain managed with norco and IV morphine. Heparin administered for VTE prophylaxis. Patient is continent of bowel and bladder. Call light is within reach, non skid socks are on.        Problem: Patient Centered Care  Goal: Patient preferences are identified and integrated in the patient's plan of care  Description: Interventions:  - What would you like us to know as we care for you?   - Provide timely, complete, and accurate information to patient/family  - Incorporate patient and family knowledge, values, beliefs, and cultural backgrounds into the planning and delivery of care  - Encourage patient/family to participate in care and decision-making at the level they choose  - Honor patient and family perspectives and choices  Outcome: Progressing     Problem: PAIN - ADULT  Goal: Verbalizes/displays adequate comfort level or patient's stated pain goal  Description: INTERVENTIONS:  - Encourage pt to monitor pain and request assistance  - Assess pain using appropriate pain scale  - Administer analgesics based on type and severity of pain and evaluate response  - Implement non-pharmacological measures as appropriate and evaluate response  - Consider cultural and social influences on pain and pain management  - Manage/alleviate anxiety  - Utilize distraction and/or relaxation techniques  - Monitor for opioid side effects  - Notify MD/LIP if interventions unsuccessful or patient reports new pain  - Anticipate increased pain with activity and pre-medicate as appropriate  Outcome: Progressing     Problem: RISK FOR INFECTION - ADULT  Goal: Absence of fever/infection during anticipated neutropenic period  Description: INTERVENTIONS  - Monitor WBC  - Administer growth factors as ordered  - Implement neutropenic guidelines  Outcome: Progressing     Problem: SAFETY ADULT - FALL  Goal: Free from fall injury  Description:  INTERVENTIONS:  - Assess pt frequently for physical needs  - Identify cognitive and physical deficits and behaviors that affect risk of falls.  - [place] fall precautions as indicated by assessment.  - Educate pt/family on patient safety including physical limitations  - Instruct pt to call for assistance with activity based on assessment  - Modify environment to reduce risk of injury  - Provide assistive devices as appropriate  - Consider OT/PT consult to assist with strengthening/mobility  - Encourage toileting schedule  Outcome: Progressing     Problem: DISCHARGE PLANNING  Goal: Discharge to home or other facility with appropriate resources  Description: INTERVENTIONS:  - Identify barriers to discharge w/pt and caregiver  - Include patient/family/discharge partner in discharge planning  - Arrange for needed discharge resources and transportation as appropriate  - Identify discharge learning needs (meds, wound care, etc)  - Arrange for interpreters to assist at discharge as needed  - Consider post-discharge preferences of patient/family/discharge partner  - Complete POLST form as appropriate  - Assess patient's ability to be responsible for managing their own health  - Refer to Case Management Department for coordinating discharge planning if the patient needs post-hospital services based on physician/LIP order or complex needs related to functional status, cognitive ability or social support system  Outcome: Progressing     Problem: Altered Communication/Language Barrier  Goal: Patient/Family is able to understand and participate in their care  Description: Interventions:  - Assess communication ability and preferred communication style  - Implement communication aides and strategies  - Use visual cues when possible  - Listen attentively, be patient, do not interrupt  - Minimize distractions  - Allow time for understanding and response  - Establish method for patient to ask for assistance (call light)  -  Provide an  as needed  - Communicate barriers and strategies to overcome with those who interact with patient  Outcome: Progressing

## 2024-02-28 NOTE — PLAN OF CARE
Patient is A&Ox4. BP elevated today throughout day. Nephrology was consulted, oral hydralazine was added. RA. Up x1 assist and walker. IV Gammagard administered today, day 4 of 5. Pain managed with norco and IV morphine. Heparin administered for VTE prophylaxis. . Call light and personal belongings are within reach, non skid socks are on.  Problem: Patient Centered Care  Goal: Patient preferences are identified and integrated in the patient's plan of care  Description: Interventions:  - What would you like us to know as we care for you?   - Provide timely, complete, and accurate information to patient/family  - Incorporate patient and family knowledge, values, beliefs, and cultural backgrounds into the planning and delivery of care  - Encourage patient/family to participate in care and decision-making at the level they choose  - Honor patient and family perspectives and choices  Outcome: Progressing     Problem: PAIN - ADULT  Goal: Verbalizes/displays adequate comfort level or patient's stated pain goal  Description: INTERVENTIONS:  - Encourage pt to monitor pain and request assistance  - Assess pain using appropriate pain scale  - Administer analgesics based on type and severity of pain and evaluate response  - Implement non-pharmacological measures as appropriate and evaluate response  - Consider cultural and social influences on pain and pain management  - Manage/alleviate anxiety  - Utilize distraction and/or relaxation techniques  - Monitor for opioid side effects  - Notify MD/LIP if interventions unsuccessful or patient reports new pain  - Anticipate increased pain with activity and pre-medicate as appropriate  Outcome: Progressing     Problem: RISK FOR INFECTION - ADULT  Goal: Absence of fever/infection during anticipated neutropenic period  Description: INTERVENTIONS  - Monitor WBC  - Administer growth factors as ordered  - Implement neutropenic guidelines  Outcome: Progressing     Problem: SAFETY ADULT -  FALL  Goal: Free from fall injury  Description: INTERVENTIONS:  - Assess pt frequently for physical needs  - Identify cognitive and physical deficits and behaviors that affect risk of falls.  - [place] fall precautions as indicated by assessment.  - Educate pt/family on patient safety including physical limitations  - Instruct pt to call for assistance with activity based on assessment  - Modify environment to reduce risk of injury  - Provide assistive devices as appropriate  - Consider OT/PT consult to assist with strengthening/mobility  - Encourage toileting schedule  Outcome: Progressing     Problem: DISCHARGE PLANNING  Goal: Discharge to home or other facility with appropriate resources  Description: INTERVENTIONS:  - Identify barriers to discharge w/pt and caregiver  - Include patient/family/discharge partner in discharge planning  - Arrange for needed discharge resources and transportation as appropriate  - Identify discharge learning needs (meds, wound care, etc)  - Arrange for interpreters to assist at discharge as needed  - Consider post-discharge preferences of patient/family/discharge partner  - Complete POLST form as appropriate  - Assess patient's ability to be responsible for managing their own health  - Refer to Case Management Department for coordinating discharge planning if the patient needs post-hospital services based on physician/LIP order or complex needs related to functional status, cognitive ability or social support system  Outcome: Progressing     Problem: Altered Communication/Language Barrier  Goal: Patient/Family is able to understand and participate in their care  Description: Interventions:  - Assess communication ability and preferred communication style  - Implement communication aides and strategies  - Use visual cues when possible  - Listen attentively, be patient, do not interrupt  - Minimize distractions  - Allow time for understanding and response  - Establish method for  patient to ask for assistance (call light)  - Provide an  as needed  - Communicate barriers and strategies to overcome with those who interact with patient  Outcome: Progressing

## 2024-02-29 LAB
ALBUMIN SERPL-MCNC: 3 G/DL (ref 3.2–4.8)
ANION GAP SERPL CALC-SCNC: 5 MMOL/L (ref 0–18)
BUN BLD-MCNC: 34 MG/DL (ref 9–23)
BUN/CREAT SERPL: 21.7 (ref 10–20)
CALCIUM BLD-MCNC: 9.3 MG/DL (ref 8.7–10.4)
CHLORIDE SERPL-SCNC: 109 MMOL/L (ref 98–112)
CO2 SERPL-SCNC: 22 MMOL/L (ref 21–32)
CREAT BLD-MCNC: 1.57 MG/DL
EGFRCR SERPLBLD CKD-EPI 2021: 48 ML/MIN/1.73M2 (ref 60–?)
GLUCOSE BLD-MCNC: 109 MG/DL (ref 70–99)
OSMOLALITY SERPL CALC.SUM OF ELEC: 290 MOSM/KG (ref 275–295)
PHOSPHATE SERPL-MCNC: 4.1 MG/DL (ref 2.4–5.1)
POTASSIUM SERPL-SCNC: 5.2 MMOL/L (ref 3.5–5.1)
SODIUM SERPL-SCNC: 136 MMOL/L (ref 136–145)

## 2024-02-29 PROCEDURE — 99233 SBSQ HOSP IP/OBS HIGH 50: CPT | Performed by: INTERNAL MEDICINE

## 2024-02-29 PROCEDURE — 99233 SBSQ HOSP IP/OBS HIGH 50: CPT | Performed by: HOSPITALIST

## 2024-02-29 RX ORDER — NIFEDIPINE 30 MG/1
30 TABLET, EXTENDED RELEASE ORAL DAILY
Status: DISCONTINUED | OUTPATIENT
Start: 2024-02-29 | End: 2024-03-01

## 2024-02-29 NOTE — PROGRESS NOTES
[facility]  part of [facility]    Progress Note    Maximo Diggs Jr. Patient Status:  Inpatient    1955 MRN [id number]   Location [facility] 4W/SW/SE Attending Sampson Rob MD   Hosp Day # 4 PCP Lito Pedroza MD       Subjective:     Feeling better.  Pt feels his legs are stronger.      Objective:   Blood pressure (!) 179/51, pulse 73, temperature 98.1 °F (36.7 °C), temperature source Oral, resp. rate 16, height 5' 5\" (1.651 m), weight 153 lb (69.4 kg), SpO2 100%.    Gen:   NAD.  A and O x 3  CV:   RRR, no m/g/r  Pulm:   CTA bilat  Abd:   +bs, soft, NT, ND  LE:   No c/c/e  Neuro:   nonfocal    Results:     Lab Results   Component Value Date    WBC 9.1 2024    HGB 12.4 (L) 2024    HCT 37.1 (L) 2024    .0 2024    CREATSERUM 1.57 (H) 2024    BUN 34 (H) 2024     2024    K 5.2 (H) 2024     2024    CO2 22.0 2024     (H) 2024    CA 9.3 2024    ALB 3.0 (L) 2024    ALKPHO 34 (L) 2023    BILT 0.4 2023    TP 7.0 2023    AST 35 (H) 2023    ALT 42 2023    TSH 1.606 2024     11/15/2021    DDIMER 0.65 (H) 11/15/2021    ESRML 53 (H) 2021    CRP 0.50 (H) 2021    PHOS 4.1 2024    TROP <0.045 11/15/2021     2024    B12 527 2017       No results found.        Assessment and Plan:     Bilateral LE weakness with falls  - suspected Guillain-Dorris  - myasthenia panel pending  - CT brain ok  - XR lumbar spine no fracture  - neurology on consult  - s/p LP  - MRI lumbar spine cannot be done due to pt's nerve stimulator  - Cont IVIG started (day 5 of 5)  - LE strength improving     Persistent HTN  Hypertensive urgency  - renal consult  - PO hydralazine added, increased  - norvasc changed to nifedipine  - cont IV prn hydralazine and IV prn labetalol  - cont po metoprolol bid        H/o temporal arteritis  - cont  prednisone taper (was on outpatient)     Back and hip pain  - norco, morphine IV prn  - improved     Other:  - CAD with h/o distant PCI/stent  - neuropathy  - HL  - 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

## 2024-02-29 NOTE — PROGRESS NOTES
[facility]  [address] [place] [address], [address]  [facility] [address]  [phone]            Maximo Diggs Jr. Patient Status:  Inpatient    1955 MRN [id number]   Location [facility] 4W/SW/SE Attending Erasmo Armstrong MD   Hosp Day # 3 PCP Lito Pedroza MD     Subjective:  Maximo Diggs Jr. is a(n) 68 year old male.    Hospital course to date: Patient apparently presented to the hospital with mechanical fall complaining of left hip and lower back pain.  Couple weeks prior he was diagnosed that his legs are getting weaker.  No provoking illness preceding, no waxing preceding event.  He was found to be areflexic.  CSF was obtained, protein was elevated.  Guillain-Barré syndrome was entertained.  IVIG was started.  Some improvement of the strength was noticed by the next couple days, however continued significant hip pain.    no complaints  No new pain or weakness  No new sob  No headaches after ivig  He is having bowel movements  Notes his BP continues to be labile       Objective:  Blood pressure (!) 189/58, pulse 90, temperature 97.5 °F (36.4 °C), resp. rate 18, height 65\", weight 153 lb (69.4 kg), SpO2 100%.    Physical Exam:  Vitals:    24 1659 24 1824 24 1839 24 1852   BP: (!) 178/65 (!) 167/53 (!) 194/59 (!) 189/58   Pulse:   85 90   Resp:       Temp:       TempSrc:       SpO2:   100%    Weight:       Height:           General: No apparent distress, well nourished, well groomed.  Head- Normocephalic, atraumatic  Eyes- No redness or swelling  Neck- No masses or adenopathy  CV: pulses were palpable and normal, no cyanosis or edema     Neurological:     Mental Status- Alert and oriented x3.  Normal attention span and concentration  Thought process intact  Memory intact- recent and remote  Mood intact  Fund of knowledge appropriate for education and age    Language intact including: comprehension, naming, repetition, vocabulary    Cranial  Nerves:    VII. Face symmetric, no facial weakness  VIII. Hearing intact to whisper, tuning fork or finger rub.  IX. Pallet elevates symmetrically.  XI. Shoulder shrug is intact  XII. Tongue is midline    Motor Exam:  Muscle tone normal  No atrophy or fasciculations  Strength- upper extremities 5/5 proximally and distally                  - lower  extremities 5/5 proximally and distally    Sensory Exam:  Light touch sensation- intact in all 4 extremities    Deep Tendon Reflexes:     2+ throughout except at his left patella where he is 1+  Nonsustained clonus at his right ankle    No clonus  No Babinski sign    Coordination:  Finger to nose intact  Rapid alternating movements intact      Lab Results   Component Value Date    WBC 9.1 02/27/2024    HGB 12.4 (L) 02/27/2024    HCT 37.1 (L) 02/27/2024    .0 02/27/2024    CREATSERUM 1.74 (H) 02/27/2024    BUN 39 (H) 02/27/2024     02/27/2024    K 4.5 02/27/2024     02/27/2024    CO2 22.0 02/27/2024     (H) 02/27/2024    CA 9.2 02/27/2024    ALB 4.2 12/05/2023    ALKPHO 34 (L) 12/05/2023    BILT 0.4 12/05/2023    TP 7.0 12/05/2023    AST 35 (H) 12/05/2023    ALT 42 12/05/2023    TSH 1.606 02/25/2024     11/15/2021    DDIMER 0.65 (H) 11/15/2021    ESRML 53 (H) 09/23/2021    CRP 0.50 (H) 09/23/2021    PHOS 2.5 09/24/2021    TROP <0.045 11/15/2021     02/25/2024    B12 527 02/08/2017       No results found.          Assessment:  Likely AIDP/Guillain-Barré syndrome  Reflexes have returned on exam on 02/28/24  Will check ganglioside antibody panel in the serum    Patient with presumed Guillain-Barré syndrome, there was elevation of protein but relatively borderline.  However also some reappearance of reflexes at the ankles with 3-day treatment of IVIG. Continues to improve after his 4th tx.   1 more day remaining.  Also history of temporal arteritis in the past couple years back, he is doing a tapering dose of steroids.    Total time  including time spent writing the note,reviewing the images, giving sign out and face to face time was  35 minutes, more than 50% of the time was spent in counseling and/or coordination of care related to  gbs/aidp.       02/28/24  7:06 PM

## 2024-02-29 NOTE — PROGRESS NOTES
[facility]  part of [facility]    Progress Note    Maximo Diggs Jr. Patient Status:  Inpatient    1955 MRN [id number]   Location [facility] 4W/SW/SE Attending Sampson Rob MD   Hosp Day # 4 PCP Lito Pedroza MD       Subjective:   Maximo Diggs Jr. is a(n) 68 year old male who I am seeing for  HTN/CKD    No new issues  Feels fine      Objective:   /43   Pulse 70   Temp 97.7 °F (36.5 °C) (Axillary)   Resp 16   Ht 5' 5\" (1.651 m)   Wt 153 lb (69.4 kg)   SpO2 99%   BMI 25.46 kg/m²      Intake/Output Summary (Last 24 hours) at 2024 1132  Last data filed at 2024 0453  Gross per 24 hour   Intake --   Output 925 ml   Net -925 ml     Wt Readings from Last 1 Encounters:   24 153 lb (69.4 kg)       Exam  Vital signs: Blood pressure 143/43, pulse 70, temperature 97.7 °F (36.5 °C), temperature source Axillary, resp. rate 16, height 5' 5\" (1.651 m), weight 153 lb (69.4 kg), SpO2 99%.    General: No acute distress. Alert and oriented x 3.  HEENT: Moist mucous membranes. EOMI. PERRLA  Neck:  No JVD.   Respiratory: Clear to auscultation bilaterally.    Cardiovascular: S1, S2.  Regular rate and rhythm.   Abdomen: Soft, nontender, nondistended.    Neurologic: No focal neurological deficits.  Musculoskeletal: Full range of motion of all extremities.  No swelling noted.  Access:    Results:     Recent Labs   Lab 24  0938 24  0159 24  0507   RBC 5.02 4.69 4.59   HGB 12.9* 12.0* 12.4*   HCT 40.7 39.0 37.1*   MCV 81.1 83.2 80.8   MCH 25.7* 25.6* 27.0   MCHC 31.7 30.8* 33.4   RDW 16.2* 16.3* 16.4*   NEPRELIM 5.52 5.39  --    WBC 7.8 6.9 9.1   .0 241.0 240.0         Recent Labs   Lab 24  0159 24  0507 24  0408   * 127* 109*   BUN 39* 39* 34*   CREATSERUM 1.89* 1.74* 1.57*   CA 8.7 9.2 9.3    136 136   K 5.0  5.0 4.5 5.2*   * 108 109   CO2 23.0 22.0 22.0          No results found.    Assessment and  Plan:     69 y/o M with h/o HTN, CKD 3 b.l cr 1.5 mg/dl ( sees Dr Kevin), CAD, retinitis pigmentosa, temporal arteritis on pred taper, presented to ER on 2/25 with b/l le weakness and a fall,  s/p LP and concern for GBS and getting tx for it with IVIG.  Cr was elevated on admission but better. Also been hypertensive while in hospital. Also c/o high BP as outpatient more recently.     Impression:    CKD 3 sec to HTN. B/l cr 1.5 mg/dl approx. Admitted with elevated cr bit now better. - UA with no blood.   Uncontrolled HTN: reviewed old imaging- CTA in 2022 reveals no NELLY. Also adrenal with no mass. Lasb with normal k and bicarb. Per pt he has had high BP for a long time however more recently its been higher than his usual readings. Cannot tolerate cozaar . Also tried chlorthalidone and had juan and so was stopped in past. Will try hydralazine and uptitrate as needed,   Anemia hgb ok  Secondary HPTH, monitor ca/phosp  B/l LE weakness / GBS, neuro following getting IVIG      Plan:    Increase hydralazine to 100 mg tid  Change amlodipine to nifedipine  Tx high K with lokelma  Cr back to b/l  Monitor BP    Thank you very much for allowing me to participate in the care of your patient.  If you have any questions, please do not hesitate to contact me.     Archana Gandhi MD  2/29/2024

## 2024-02-29 NOTE — PLAN OF CARE
Patient is alert is oriented. RA. Scds for DVT prophlyaxxis. Tele in place. Voiding freely. Up x1 with walker. PRN norco and morphine given for pain management. BP improved overnight, prn hydralazine IV given. Call light within reach, bed alarm active.  Problem: Patient Centered Care  Goal: Patient preferences are identified and integrated in the patient's plan of care  Description: Interventions:  - Provide timely, complete, and accurate information to patient/family  - Incorporate patient and family knowledge, values, beliefs, and cultural backgrounds into the planning and delivery of care  - Encourage patient/family to participate in care and decision-making at the level they choose  - Honor patient and family perspectives and choices  Outcome: Progressing     Problem: PAIN - ADULT  Goal: Verbalizes/displays adequate comfort level or patient's stated pain goal  Description: INTERVENTIONS:  - Encourage pt to monitor pain and request assistance  - Assess pain using appropriate pain scale  - Administer analgesics based on type and severity of pain and evaluate response  - Implement non-pharmacological measures as appropriate and evaluate response  - Consider cultural and social influences on pain and pain management  - Manage/alleviate anxiety  - Utilize distraction and/or relaxation techniques  - Monitor for opioid side effects  - Notify MD/LIP if interventions unsuccessful or patient reports new pain  - Anticipate increased pain with activity and pre-medicate as appropriate  Outcome: Progressing     Problem: RISK FOR INFECTION - ADULT  Goal: Absence of fever/infection during anticipated neutropenic period  Description: INTERVENTIONS  - Monitor WBC  - Administer growth factors as ordered  - Implement neutropenic guidelines  Outcome: Progressing     Problem: SAFETY ADULT - FALL  Goal: Free from fall injury  Description: INTERVENTIONS:  - Assess pt frequently for physical needs  - Identify cognitive and physical  deficits and behaviors that affect risk of falls.  - [place] fall precautions as indicated by assessment.  - Educate pt/family on patient safety including physical limitations  - Instruct pt to call for assistance with activity based on assessment  - Modify environment to reduce risk of injury  - Provide assistive devices as appropriate  - Consider OT/PT consult to assist with strengthening/mobility  - Encourage toileting schedule  Outcome: Progressing     Problem: DISCHARGE PLANNING  Goal: Discharge to home or other facility with appropriate resources  Description: INTERVENTIONS:  - Identify barriers to discharge w/pt and caregiver  - Include patient/family/discharge partner in discharge planning  - Arrange for needed discharge resources and transportation as appropriate  - Identify discharge learning needs (meds, wound care, etc)  - Arrange for interpreters to assist at discharge as needed  - Consider post-discharge preferences of patient/family/discharge partner  - Complete POLST form as appropriate  - Assess patient's ability to be responsible for managing their own health  - Refer to Case Management Department for coordinating discharge planning if the patient needs post-hospital services based on physician/LIP order or complex needs related to functional status, cognitive ability or social support system  Outcome: Progressing

## 2024-02-29 NOTE — PROGRESS NOTES
[facility]  [address] [address], [address]  [facility] [address]  [phone]          INPATIENT NEUROLOGY   FOLLOW UP CONSULT NOTE       [facility]  part of [facility]    Report of Consultation    Maximo Diggs JrCoreen Patient Status:  Inpatient     1955 MRN [id number]    Location [facility] 4W/SW/SE Attending Sampson Rob MD    Hosp Day # 4 PCP Liot Pedroza MD      Date of Admission:  2024  Date of Consult Follow Up:  2024        INTERVAL HPI:   -Feeling well.         ?PHYSICAL EXAM:     /43   Pulse 70   Temp 97.7 °F (36.5 °C) (Axillary)   Resp 16   Ht 65\"   Wt 153 lb (69.4 kg)   SpO2 99%   BMI 25.46 kg/m²   General appearance: Well appearing, and in no acute distress  Skin: skin color, texture normal.  No rashes or lesions.    Head: Normocephalic, atraumatic.    Neurological exam:  Mental Status: Alert, oriented to situation/normal fund of knowledgeFollows commands, and Speech fluent and appropriate.  Motor: muscle strength 5/5 both upper and lower extremities except subtle 4+ to 5-/5 at hip flexors bilaterally     LABS/DATA:    Lab Results   Component Value Date    CREATSERUM 1.57 2024    BUN 34 2024     2024    K 5.2 2024     2024    CO2 22.0 2024     2024    CA 9.3 2024    ALB 3.0 2024    PHOS 4.1 2024       HGBA1C:  No results found for: \"A1C\", \"EAG\"           IMAGING:  CT BRAIN OR HEAD (89027)    Result Date: 2024  CONCLUSION: No acute finding    Dictated by (CST): Ken Chapa MD on 2024 at 10:21 AM     Finalized by (CST): Ken Chapa MD on 2024 at 10:22 AM          XR LUMBAR SPINE (MIN 2 VIEWS) (CPT=72100)    Result Date: 2024  CONCLUSION:   No acute fracture or malalignment.  Mild chronic appearing compression deformities T12, L1 and L2.  Postsurgical change lumbar spine as above with moderate lumbar spine  degenerative change.    Dictated by (CST): Lokesh Boland MD on 2/25/2024 at 8:26 AM     Finalized by (CST): Lokesh Boland MD on 2/25/2024 at 8:30 AM          XR HIP W OR WO PELVIS 2 OR 3 VIEWS, LEFT (CPT=73502)    Result Date: 2/25/2024  CONCLUSION:   No acute fracture or dislocation.  Mild left hip osteoarthritis.    Dictated by (CST): Lokesh Boland MD on 2/25/2024 at 8:25 AM     Finalized by (CST): Lokesh Boland MD on 2/25/2024 at 8:26 AM              ASSESSMENT:  The patient is a 68 year old man with past medical history of L3-5 fractures s/p repair, retinitis pigmentosa with low visual acuity bilaterally, GCA, coronary artery disease, neuropathy, hyperlipidemia and hypertensionn who presented with a mechanical fall and complaining of left hip and lower back pain in the setting of 2 weeks of lower extremity weakness.  CT brain with no acute findings.  X-ray of lumbar spine with no fractures.  Spinal fluid was obtained in the ER.  CSF colorless and clear, 7 WBC, 48 RBC, protein 53.9 elevated, glucose 89, culture and stain normal, differential normal  Normal aldolase and CK, TSH      His neurological examination is significant for hip flexor and right knee flexion weakness with hip pain and lower extremity areflexia.       Guillain-Barré syndrome   -Follow-up CSF routine analysis   - Complete IVIG 5 days (last day today)  - Follow-up ganglioside antibody panel and myasthenia antibodies as outpatient   -PT, OT   -Telemetry     No further inpatient neurological testing needed.  Follow up in 1 month.  Please call w/ further questions.        This note was prepared using Dragon Medical voice recognition dictation software and as a result, errors may occur. When identified, these errors have been corrected. While every attempt is made to correct errors during dictation, discrepancies may still exist    TERRY Orr DO   Staff Neurologist   2/29/2024  12:39 PM

## 2024-02-29 NOTE — PLAN OF CARE
Maximo is from home with spouse. Alert x4. Last dose of IVIG given today. Vision impairment, no peripheral vision. RA. Heparin and plavix. Tolerating general diet. Voiding with urinal. Ambulates x1 with RW. Flexeril given for pain. Plan for home once cleared. Call light within reach. Questions answered and concerns addressed at this time. Anticipating NN upon discharge.     Problem: Patient Centered Care  Goal: Patient preferences are identified and integrated in the patient's plan of care  Description: Interventions:  - What would you like us to know as we care for you?  - Provide timely, complete, and accurate information to patient/family  - Incorporate patient and family knowledge, values, beliefs, and cultural backgrounds into the planning and delivery of care  - Encourage patient/family to participate in care and decision-making at the level they choose  - Honor patient and family perspectives and choices  Outcome: Progressing     Problem: PAIN - ADULT  Goal: Verbalizes/displays adequate comfort level or patient's stated pain goal  Description: INTERVENTIONS:  - Encourage pt to monitor pain and request assistance  - Assess pain using appropriate pain scale  - Administer analgesics based on type and severity of pain and evaluate response  - Implement non-pharmacological measures as appropriate and evaluate response  - Consider cultural and social influences on pain and pain management  - Manage/alleviate anxiety  - Utilize distraction and/or relaxation techniques  - Monitor for opioid side effects  - Notify MD/LIP if interventions unsuccessful or patient reports new pain  - Anticipate increased pain with activity and pre-medicate as appropriate  Outcome: Progressing     Problem: RISK FOR INFECTION - ADULT  Goal: Absence of fever/infection during anticipated neutropenic period  Description: INTERVENTIONS  - Monitor WBC  - Administer growth factors as ordered  - Implement neutropenic guidelines  Outcome:  Progressing     Problem: SAFETY ADULT - FALL  Goal: Free from fall injury  Description: INTERVENTIONS:  - Assess pt frequently for physical needs  - Identify cognitive and physical deficits and behaviors that affect risk of falls.  - [place] fall precautions as indicated by assessment.  - Educate pt/family on patient safety including physical limitations  - Instruct pt to call for assistance with activity based on assessment  - Modify environment to reduce risk of injury  - Provide assistive devices as appropriate  - Consider OT/PT consult to assist with strengthening/mobility  - Encourage toileting schedule  Outcome: Progressing     Problem: DISCHARGE PLANNING  Goal: Discharge to home or other facility with appropriate resources  Description: INTERVENTIONS:  - Identify barriers to discharge w/pt and caregiver  - Include patient/family/discharge partner in discharge planning  - Arrange for needed discharge resources and transportation as appropriate  - Identify discharge learning needs (meds, wound care, etc)  - Arrange for interpreters to assist at discharge as needed  - Consider post-discharge preferences of patient/family/discharge partner  - Complete POLST form as appropriate  - Assess patient's ability to be responsible for managing their own health  - Refer to Case Management Department for coordinating discharge planning if the patient needs post-hospital services based on physician/LIP order or complex needs related to functional status, cognitive ability or social support system  Outcome: Progressing     Problem: Altered Communication/Language Barrier  Goal: Patient/Family is able to understand and participate in their care  Description: Interventions:  - Assess communication ability and preferred communication style  - Implement communication aides and strategies  - Use visual cues when possible  - Listen attentively, be patient, do not interrupt  - Minimize distractions  - Allow time for understanding  and response  - Establish method for patient to ask for assistance (call light)  - Provide an  as needed  - Communicate barriers and strategies to overcome with those who interact with patient  Outcome: Progressing

## 2024-03-01 VITALS
WEIGHT: 153 LBS | OXYGEN SATURATION: 99 % | HEART RATE: 82 BPM | TEMPERATURE: 98 F | DIASTOLIC BLOOD PRESSURE: 51 MMHG | RESPIRATION RATE: 18 BRPM | SYSTOLIC BLOOD PRESSURE: 134 MMHG | HEIGHT: 65 IN | BODY MASS INDEX: 25.49 KG/M2

## 2024-03-01 PROCEDURE — 99239 HOSP IP/OBS DSCHRG MGMT >30: CPT | Performed by: HOSPITALIST

## 2024-03-01 PROCEDURE — 99232 SBSQ HOSP IP/OBS MODERATE 35: CPT | Performed by: INTERNAL MEDICINE

## 2024-03-01 RX ORDER — HYDRALAZINE HYDROCHLORIDE 100 MG/1
100 TABLET, FILM COATED ORAL EVERY 8 HOURS SCHEDULED
Qty: 90 TABLET | Refills: 1 | Status: ON HOLD | OUTPATIENT
Start: 2024-03-01

## 2024-03-01 RX ORDER — HYDROCODONE BITARTRATE AND ACETAMINOPHEN 5; 325 MG/1; MG/1
1 TABLET ORAL EVERY 6 HOURS PRN
Qty: 20 TABLET | Refills: 0 | Status: ON HOLD | OUTPATIENT
Start: 2024-03-01

## 2024-03-01 NOTE — PLAN OF CARE
Patient has safety precautions in place bed in the lowest position, bed alarm on, and call light within reach. Plan of care ongoing. No further concerns as of present.    Problem: Patient Centered Care  Goal: Patient preferences are identified and integrated in the patient's plan of care  Description: Interventions:    - Provide timely, complete, and accurate information to patient/family  - Incorporate patient and family knowledge, values, beliefs, and cultural backgrounds into the planning and delivery of care  - Encourage patient/family to participate in care and decision-making at the level they choose  - Honor patient and family perspectives and choices  Outcome: Progressing     Problem: PAIN - ADULT  Goal: Verbalizes/displays adequate comfort level or patient's stated pain goal  Description: INTERVENTIONS:  - Encourage pt to monitor pain and request assistance  - Assess pain using appropriate pain scale  - Administer analgesics based on type and severity of pain and evaluate response  - Implement non-pharmacological measures as appropriate and evaluate response  - Consider cultural and social influences on pain and pain management  - Manage/alleviate anxiety  - Utilize distraction and/or relaxation techniques  - Monitor for opioid side effects  - Notify MD/LIP if interventions unsuccessful or patient reports new pain  - Anticipate increased pain with activity and pre-medicate as appropriate  Outcome: Progressing     Problem: RISK FOR INFECTION - ADULT  Goal: Absence of fever/infection during anticipated neutropenic period  Description: INTERVENTIONS  - Monitor WBC  - Administer growth factors as ordered  - Implement neutropenic guidelines  Outcome: Progressing     Problem: SAFETY ADULT - FALL  Goal: Free from fall injury  Description: INTERVENTIONS:  - Assess pt frequently for physical needs  - Identify cognitive and physical deficits and behaviors that affect risk of falls.  - [place] fall precautions as  indicated by assessment.  - Educate pt/family on patient safety including physical limitations  - Instruct pt to call for assistance with activity based on assessment  - Modify environment to reduce risk of injury  - Provide assistive devices as appropriate  - Consider OT/PT consult to assist with strengthening/mobility  - Encourage toileting schedule  Outcome: Progressing     Problem: DISCHARGE PLANNING  Goal: Discharge to home or other facility with appropriate resources  Description: INTERVENTIONS:  - Identify barriers to discharge w/pt and caregiver  - Include patient/family/discharge partner in discharge planning  - Arrange for needed discharge resources and transportation as appropriate  - Identify discharge learning needs (meds, wound care, etc)  - Arrange for interpreters to assist at discharge as needed  - Consider post-discharge preferences of patient/family/discharge partner  - Complete POLST form as appropriate  - Assess patient's ability to be responsible for managing their own health  - Refer to Case Management Department for coordinating discharge planning if the patient needs post-hospital services based on physician/LIP order or complex needs related to functional status, cognitive ability or social support system  Outcome: Progressing     Problem: Altered Communication/Language Barrier  Goal: Patient/Family is able to understand and participate in their care  Description: Interventions:  - Assess communication ability and preferred communication style  - Implement communication aides and strategies  - Use visual cues when possible  - Listen attentively, be patient, do not interrupt  - Minimize distractions  - Allow time for understanding and response  - Establish method for patient to ask for assistance (call light)  - Provide an  as needed  - Communicate barriers and strategies to overcome with those who interact with patient  Outcome: Progressing

## 2024-03-01 NOTE — DISCHARGE SUMMARY
[facility]  part of [facility]    Discharge Summary    Maximo Diggs Jr. 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  Persistent HTN  Hypertensive urgency  H/o temporal arteritis  Back and hip pain  Hx of CAD with h/o distant PCI/stent  Hx of neuropathy  Hx of HL  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  Hx of CKD stage 3       Problem List:     Patient Active Problem List   Diagnosis    ESR raised    Cramps, extremity    Enthesopathy of wrist and carpus    Calcium pyrophosphate arthropathy    Stiffness of finger joint, right    SX/GLOBAL/  /[facility]/ARTHROTOMY DEBRIDEMENT AND BIOPSY FOR CRYSTALS RIGHT INDEX AND LONG FINGERS MP / DOS 10-14-15 / EXP 16    Chondrocostal junction syndrome (tietze)    Anterior chest wall pain    Trigger finger, right middle finger    Complex regional pain syndrome type 1 of left upper extremity    Left arm pain    PIERCE (acute kidney injury) ([facility])    Acute nonintractable headache, unspecified headache type    Strain of lumbar region, initial encounter    Weakness of both lower extremities    Contusion of left hip, initial encounter    GBS (Guillain [place] syndrome) ([facility])    Resistant hypertension       Physical Exam:      /51 (BP Location: Left arm)   Pulse 82   Temp 98.1 °F (36.7 °C) (Oral)   Resp 18   Ht 5' 5\" (1.651 m)   Wt 153 lb (69.4 kg)   SpO2 99%   BMI 25.46 kg/m²     Gen:  NAD.  A and O x  3  CV:   RRR.  No  m/g/r  Pulm:   CTA bilat  Abd:   +bs, soft, NT, ND  LE:  No c/c/e  Neuro:  nonfocal      Reason for Admission:   Leg weakness with fall    History of Present Illness:   Maximo Diggs Jr. is a(n) 68 year old male with a h/o CAD, HL, HTN, retinitis pigmentosa, neuropathy, work related spinal fractures in 1998 s/p rods L3-5. He has had b/l hip pain the past 2 weeks, and legs have been weak and giving out. He came to ED after a fall. CT brain and XR lumbar spine were unremarkable. LP was done in ED. He is being admitted.      Hospital Course:     Bilateral LE weakness with falls  - suspected Guillain-[place] syndrome  - myasthenia panel pending  - CT brain ok  - XR lumbar spine no fracture  - neurology was on consult  - s/p LP  - MRI lumbar spine cannot be done due to pt's nerve stimulator  - completed 5 dys IVIG  - pt has outpt PT  - LE strength improved  - PCP and neuro f/u     Persistent HTN - better  Hypertensive urgency - better  - renal was on consult  - pt to f/u with his nephrologist and PCP  - PO hydralazine added, increased  - cont nifedipine  - norvasc stopped  - cont home bisoprolol  - was given IV prn hydralazine and IV prn labetalol     H/o temporal arteritis  - cont prednisone taper (was on outpatient)     Back and hip pain  - norco, morphine IV prn  - improved     Other:  - CAD with h/o distant PCI/stent  - neuropathy  - HL  - decreased peripheral vision from retinitis pigmentosa  - CKD stage 3     dvt proph:    heparin       Code status:    Full       Consultations:   Neurology, nephrology    Discharge Condition:  Good    Discharge Medications:      Discharge Medications        START taking these medications        Instructions Prescription details   hydrALAZINE 100 MG Tabs  Commonly known as: Apresoline      Take 1 tablet (100 mg total) by mouth every 8 (eight) hours.   Quantity: 90 tablet  Refills: 1     lidocaine 5 % Ptch  Commonly known as: Lidoderm      Place 1 patch onto the skin daily for 10  days.   Stop taking on: March 6, 2024  Quantity: 10 patch  Refills: 0            CONTINUE taking these medications        Instructions Prescription details   alendronate 70 MG Tabs  Commonly known as: Fosamax      Take 1 tablet (70 mg total) by mouth once a week. On SUNDAYS only   Refills: 0     alfuzosin ER 10 MG Tb24  Commonly known as: Uroxatral ER      Take 1 tablet (10 mg total) by mouth daily.   Refills: 0     bisoprolol 5 MG Tabs  Commonly known as: Zebeta      Take 1 tablet (5 mg total) by mouth in the morning and 1 tablet (5 mg total) before bedtime.   Refills: 0     cholecalciferol 25 MCG (1000 UT) Tabs      Take 1 tablet (1,000 Units total) by mouth daily.   Refills: 0     clopidogrel 75 MG Tabs  Commonly known as: Plavix      Take 1 tablet (75 mg total) by mouth daily.   Refills: 0     CoQ10 100 MG Caps      Take 100 mg by mouth in the morning and 100 mg before bedtime.   Refills: 0     cyclobenzaprine 10 MG Tabs  Commonly known as: Flexeril      Take 1 tablet (10 mg total) by mouth 3 (three) times daily as needed for Muscle spasms.   Refills: 0     Esomeprazole Magnesium 20 MG Cpdr  Commonly known as: NEXIUM      Take 1 capsule (20 mg total) by mouth every morning before breakfast.   Refills: 0     HYDROcodone-acetaminophen 5-325 MG Tabs  Commonly known as: Norco      Take 1 tablet by mouth every 6 (six) hours as needed for Pain.   Quantity: 20 tablet  Refills: 0     levothyroxine 25 MCG Tabs  Commonly known as: Synthroid      Take 1 tablet (25 mcg total) by mouth before breakfast.   Refills: 0     NIFEdipine ER 60 MG Tb24  Commonly known as: Procardia-XL      Take 60 mg by mouth 2 (two) times a day.   Refills: 0     ondansetron 4 MG Tbdp  Commonly known as: Zofran-ODT      Take 1 tablet (4 mg total) by mouth every 8 (eight) hours as needed for Nausea.   Quantity: 6 tablet  Refills: 0     predniSONE 10 MG Tabs  Commonly known as: Deltasone      Take 1 tablet (10 mg total) by mouth 2 (two) times daily.    Refills: 0     pregabalin 75 MG Caps  Commonly known as: Lyrica      Take 1 capsule (75 mg total) by mouth daily.   Quantity: 15 capsule  Refills: 1            STOP taking these medications      amLODIPine 5 MG Tabs  Commonly known as: Norvasc                  Where to Get Your Medications        These medications were sent to [facility] [id number] - [place], [place] - [address] [phone], [phone]  [address], [facility] [address]      Phone: [phone]   hydrALAZINE 100 MG Tabs       Please  your prescriptions at the location directed by your doctor or nurse    Bring a paper prescription for each of these medications  HYDROcodone-acetaminophen 5-325 MG Tabs  lidocaine 5 % Ptch         Follow up Visits:     Follow-up Information       Lito Pedroza MD. Schedule an appointment as soon as possible for a visit in 1 week(s).    Specialty: Internal Medicine  Contact information:  [address]  [address]  [facility] [address] [phone]               Loretta Orr DO. Schedule an appointment as soon as possible for a visit in 1 month(s).    Specialty: NEUROLOGY  Contact information:  [address]  [facility] [address] [phone]                             Hospital Discharge Diagnoses:  [place] syndrome    Lace+ Score: 40  59-90 High Risk  29-58 Medium Risk  0-28   Low Risk.    TCM Follow-Up Recommendation:  LACE 29-58: Moderate Risk of readmission after discharge from the hospital.    >35 minutes spent preparing this discharge.    Sampson Mercedes MD  3/1/2024  1:05 PM    Consent: The risks of pain and injection site reactions were reviewed with the patient prior to the injection.

## 2024-03-01 NOTE — DISCHARGE INSTRUCTIONS
Please follow up with your regular nephrologist within 1 week for blood pressure check and medication adjustment.

## 2024-03-01 NOTE — PROGRESS NOTES
[facility]  part of [facility]    Progress Note    Maximo Diggs Jr. Patient Status:  Inpatient    1955 MRN [id number]   Location [facility] 4W/SW/SE Attending Sampson Rob MD   Hosp Day # 5 PCP Lito Pedroza MD       Subjective:   Maximo Diggs Jr. is a(n) 68 year old male who I am seeing for  HTN/CKD    No new issues  Bp better   He feels much better, walked with PT   No dizziness  Per pt one episode yest when he got up too fast and that is normal for him      Objective:   /51 (BP Location: Left arm)   Pulse 82   Temp 98.1 °F (36.7 °C) (Oral)   Resp 18   Ht 5' 5\" (1.651 m)   Wt 153 lb (69.4 kg)   SpO2 99%   BMI 25.46 kg/m²      Intake/Output Summary (Last 24 hours) at 3/1/2024 1301  Last data filed at 3/1/2024 0605  Gross per 24 hour   Intake 240 ml   Output 900 ml   Net -660 ml     Wt Readings from Last 1 Encounters:   24 153 lb (69.4 kg)       Exam  Vital signs: Blood pressure 134/51, pulse 82, temperature 98.1 °F (36.7 °C), temperature source Oral, resp. rate 18, height 5' 5\" (1.651 m), weight 153 lb (69.4 kg), SpO2 99%.    General: No acute distress. Alert and oriented x 3.  HEENT: Moist mucous membranes. EOMI. PERRLA  Neck:  No JVD.   Respiratory: Clear to auscultation bilaterally.    Cardiovascular: S1, S2.  Regular rate and rhythm.   Abdomen: Soft, nontender, nondistended.    Neurologic: No focal neurological deficits.  Musculoskeletal: Full range of motion of all extremities.  No swelling noted.  Access:    Results:     Recent Labs   Lab 24  0938 24  0159 24  0507   RBC 5.02 4.69 4.59   HGB 12.9* 12.0* 12.4*   HCT 40.7 39.0 37.1*   MCV 81.1 83.2 80.8   MCH 25.7* 25.6* 27.0   MCHC 31.7 30.8* 33.4   RDW 16.2* 16.3* 16.4*   NEPRELIM 5.52 5.39  --    WBC 7.8 6.9 9.1   .0 241.0 240.0         Recent Labs   Lab 24  0159 24  0507 24  0408   * 127* 109*   BUN 39* 39* 34*   CREATSERUM 1.89* 1.74*  1.57*   CA 8.7 9.2 9.3    136 136   K 5.0  5.0 4.5 5.2*   * 108 109   CO2 23.0 22.0 22.0          No results found.    Assessment and Plan:     69 y/o M with h/o HTN, CKD 3 b.l cr 1.5 mg/dl ( sees Dr Kevin), CAD, retinitis pigmentosa, temporal arteritis on pred taper, presented to ER on 2/25 with b/l le weakness and a fall,  s/p LP and concern for GBS and getting tx for it with IVIG.  Cr was elevated on admission but better. Also been hypertensive while in hospital. Also c/o high BP as outpatient more recently.     Impression:    CKD 3 sec to HTN. B/l cr 1.5 mg/dl approx. Admitted with elevated cr bit now better. - UA with no blood.   Uncontrolled HTN: reviewed old imaging- CTA in 2022 reveals no NELLY. Also adrenal with no mass. Lasb with normal k and bicarb. Per pt he has had high BP for a long time however more recently its been higher than his usual readings. Cannot tolerate cozaar . Also tried chlorthalidone and had juan and so was stopped in past. Will try hydralazine and uptitrate as needed,   Anemia hgb ok  Secondary HPTH, monitor ca/phosp  B/l LE weakness / GBS, neuro following getting IVIG      Plan:    Hydralazine to 100 mg tid  Change amlodipine to nifedipine Xl 30 mg/day  Bystolic at home and metoprolol here    Wants to go home--> if BP remains ok by evening then will be ok to be discharged on hydralazine / nifedipine and bystolic with outpatient fu with his nephrologist    Thank you very much for allowing me to participate in the care of your patient.  If you have any questions, please do not hesitate to contact me.     Archana Gandhi MD

## 2024-03-01 NOTE — PHYSICAL THERAPY NOTE
PHYSICAL THERAPY TREATMENT NOTE - INPATIENT     Room Number: 409/409-A       Presenting Problem: strain of lumbar region       Problem List  Principal Problem:    Strain of lumbar region, initial encounter  Active Problems:    Weakness of both lower extremities    Contusion of left hip, initial encounter    GBS (Guillain [place] syndrome) ([facility])    Resistant hypertension      PHYSICAL THERAPY ASSESSMENT   Patient demonstrates good  progress this session, goals  remain in progress.    Patient continues to function near baseline with bed mobility, transfers, gait, stair negotiation, and standing prolonged periods.  Contributing factors to remaining limitations include decreased functional strength, decreased endurance/aerobic capacity, and decreased muscular endurance.  Next session anticipate patient to progress bed mobility, transfers, gait, stair negotiation, and standing prolonged periods.  Physical Therapy will continue to follow patient for duration of hospitalization.    Patient continues to benefit from continued skilled PT services: at discharge to promote functional independence and safety with additional support and return home with OP PT.    PLAN  PT Treatment Plan: Bed mobility;Body mechanics;Coordination;Endurance;Patient education;Gait training;Strengthening;Stoop training;Stair training;Transfer training;Balance training  Frequency (Obs): 5x/week    SUBJECTIVE  Pt was agreeable to therapy session.         OBJECTIVE  Precautions: Bed/chair alarm;Low vision (tunnel vision)    WEIGHT BEARING RESTRICTION                PAIN ASSESSMENT   Rating:  (did not rate)  Location: back  Management Techniques: Activity promotion;Body mechanics;Relaxation;Repositioning    BALANCE  Static Sitting: Good  Dynamic Sitting: Fair +  Static Standing: Fair  Dynamic Standing: Fair -    ACTIVITY TOLERANCE                          O2 WALK       AM-PAC '6-Clicks' INPATIENT SHORT FORM - BASIC MOBILITY  How much difficulty does the  patient currently have...  Patient Difficulty: Turning over in bed (including adjusting bedclothes, sheets and blankets)?: A Little   Patient Difficulty: Sitting down on and standing up from a chair with arms (e.g., wheelchair, bedside commode, etc.): A Little   Patient Difficulty: Moving from lying on back to sitting on the side of the bed?: A Little   How much help from another person does the patient currently need...   Help from Another: Moving to and from a bed to a chair (including a wheelchair)?: A Little   Help from Another: Need to walk in hospital room?: A Little   Help from Another: Climbing 3-5 steps with a railing?: A Little     AM-PAC Score:  Raw Score: 18   Approx Degree of Impairment: 46.58%   Standardized Score (AM-PAC Scale): 43.63   CMS Modifier (G-Code): CK    FUNCTIONAL ABILITY STATUS  Functional Mobility/Gait Assessment  Gait Assistance: Supervision  Distance (ft): 60'  Assistive Device: Rolling walker  Pattern:  (narrow NICHOLE)  Stairs: Stairs  How Many Stairs: 8  Device: 1 Rail;2 Rails  Assist: Contact guard assist  Pattern: Ascend and Descend  Ascend and Descend : Step to  Rolling: modified independent  Supine to Sit: modified independent  Sit to Supine:  NT  Sit to Stand: stand-by assist    Additional information: Pt is received in the bed and was cleared for therapy session. Pt is mod I with bed mobility and to transfer to the EOB. Pt sat EOB for a few minutes and denied any dizziness and light headedness. Pt is SBA with sit<>stand transfers with the R.  Pt is cued for proper hand placement for safety. Pt was able to AMB about 60' with the RW SBA. Pt with decreased magdaleno and step length with narrow NICHOLE but with very good balance and safety awareness. Pt was brought to the therapy gym for stair training. Pt was educated and negotiated 8 stairs with 1-2 HR's CGA for balance and safety. Pt was cued for proper technique and sequencing. Pt with very good balance on the stairs. Returned pt back  to the room and to sitting in the chair with all needs within reach. Reviewed and educated pt with his spinal precautions and all were maintained. Pt is on track to dc to home once medically cleared. Reported to the RN on the status of the pt.     The patient's Approx Degree of Impairment: 46.58% has been calculated based on documentation in the [facility] '6 clicks' Inpatient Daily Activity Short Form.  Research supports that patients with this level of impairment may benefit from home with assist and out patient PT.  Final disposition will be made by interdisciplinary medical team.      Patient End of Session: Up in chair;Needs met;Call light within reach;RN aware of session/findings;All patient questions and concerns addressed    CURRENT GOALS   Goals to be met by: 3/15  Patient Goal Patient's self-stated goal is: unstated   Goal #1 Patient is able to demonstrate supine - sit EOB @ level: supervision      Goal #1   Current Status Mod I   Goal #2 Patient is able to demonstrate transfers Sit to/from Stand at assistance level: supervision with walker - rolling      Goal #2  Current Status  SBA with RW    Goal #3 Patient is able to ambulate 150 feet with assist device: walker - rolling at assistance level: supervision   Goal #3   Current Status  60' with RW SBA   Goal #4 Patient will negotiate 4 stairs/one curb w/ assistive device and supervision   Goal #4   Current Status 8 stairs with 1-2 HR's CGA    Goal #5 Patient to demonstrate independence with home activity/exercise instructions provided to patient in preparation for discharge.   Goal #5   Current Status IN PROGRESS   Goal #6     Goal #6  Current Status         Therapeutic Activity: 30 minutes

## 2024-03-05 LAB
GD1B ANTIBODY, IGG: 17 IV
GD1B ANTIBODY, IGM: 3 IV
GM1 ANTIBODY, IGG: 11 IV
GM1 ANTIBODY, IGM: 38 IV
GQ1B ANTIBODY, IGG: 14 IV
GQ1B ANTIBODY, IGM: 2 IV

## 2024-03-06 LAB
ACHR BINDING ABS: <0.03 NMOL/L
ACHR BLOCKING ABS: 20 %
ACHR-MODULATING AB: 0 %
MUSK ABS, SERUM: <1 U/ML
STRIATIONAL ABS, SERUM: NEGATIVE

## 2024-03-09 ENCOUNTER — HOSPITAL ENCOUNTER (INPATIENT)
Facility: HOSPITAL | Age: 69
LOS: 3 days | Discharge: NURSING, REHAB OR LONG-TERM CARE | End: 2024-03-13
Attending: EMERGENCY MEDICINE | Admitting: INTERNAL MEDICINE
Payer: MEDICARE

## 2024-03-09 ENCOUNTER — APPOINTMENT (OUTPATIENT)
Dept: GENERAL RADIOLOGY | Facility: HOSPITAL | Age: 69
End: 2024-03-09
Payer: MEDICARE

## 2024-03-09 ENCOUNTER — APPOINTMENT (OUTPATIENT)
Dept: CT IMAGING | Facility: HOSPITAL | Age: 69
End: 2024-03-09
Attending: EMERGENCY MEDICINE
Payer: MEDICARE

## 2024-03-09 ENCOUNTER — APPOINTMENT (OUTPATIENT)
Dept: GENERAL RADIOLOGY | Facility: HOSPITAL | Age: 69
End: 2024-03-09
Attending: EMERGENCY MEDICINE
Payer: MEDICARE

## 2024-03-09 DIAGNOSIS — M54.42 ACUTE LEFT-SIDED LOW BACK PAIN WITH LEFT-SIDED SCIATICA: Primary | ICD-10-CM

## 2024-03-09 DIAGNOSIS — G61.0 GBS (GUILLAIN BARRE SYNDROME) (HCC): ICD-10-CM

## 2024-03-09 DIAGNOSIS — G90.512 COMPLEX REGIONAL PAIN SYNDROME TYPE 1 OF LEFT UPPER EXTREMITY: ICD-10-CM

## 2024-03-09 DIAGNOSIS — M89.8X2 PAIN OF LEFT HUMERUS: ICD-10-CM

## 2024-03-09 PROCEDURE — 72100 X-RAY EXAM L-S SPINE 2/3 VWS: CPT | Performed by: EMERGENCY MEDICINE

## 2024-03-09 PROCEDURE — 73060 X-RAY EXAM OF HUMERUS: CPT | Performed by: EMERGENCY MEDICINE

## 2024-03-09 PROCEDURE — 72131 CT LUMBAR SPINE W/O DYE: CPT | Performed by: EMERGENCY MEDICINE

## 2024-03-09 PROCEDURE — 99223 1ST HOSP IP/OBS HIGH 75: CPT | Performed by: INTERNAL MEDICINE

## 2024-03-09 RX ORDER — BISACODYL 10 MG
10 SUPPOSITORY, RECTAL RECTAL
Status: DISCONTINUED | OUTPATIENT
Start: 2024-03-09 | End: 2024-03-13

## 2024-03-09 RX ORDER — ENEMA 19; 7 G/133ML; G/133ML
1 ENEMA RECTAL ONCE AS NEEDED
Status: DISCONTINUED | OUTPATIENT
Start: 2024-03-09 | End: 2024-03-13

## 2024-03-09 RX ORDER — HYDRALAZINE HYDROCHLORIDE 100 MG/1
100 TABLET, FILM COATED ORAL EVERY 8 HOURS SCHEDULED
Status: DISCONTINUED | OUTPATIENT
Start: 2024-03-09 | End: 2024-03-13

## 2024-03-09 RX ORDER — ONDANSETRON 2 MG/ML
4 INJECTION INTRAMUSCULAR; INTRAVENOUS EVERY 6 HOURS PRN
Status: DISCONTINUED | OUTPATIENT
Start: 2024-03-09 | End: 2024-03-13

## 2024-03-09 RX ORDER — PREDNISONE 10 MG/1
10 TABLET ORAL 2 TIMES DAILY
Status: DISCONTINUED | OUTPATIENT
Start: 2024-03-09 | End: 2024-03-09

## 2024-03-09 RX ORDER — PANTOPRAZOLE SODIUM 40 MG/1
40 TABLET, DELAYED RELEASE ORAL
Status: DISCONTINUED | OUTPATIENT
Start: 2024-03-10 | End: 2024-03-13

## 2024-03-09 RX ORDER — HYDROCODONE BITARTRATE AND ACETAMINOPHEN 5; 325 MG/1; MG/1
1 TABLET ORAL EVERY 4 HOURS PRN
Status: DISCONTINUED | OUTPATIENT
Start: 2024-03-09 | End: 2024-03-13

## 2024-03-09 RX ORDER — ACETAMINOPHEN 325 MG/1
650 TABLET ORAL EVERY 4 HOURS PRN
Status: DISCONTINUED | OUTPATIENT
Start: 2024-03-09 | End: 2024-03-13

## 2024-03-09 RX ORDER — MORPHINE SULFATE 2 MG/ML
1 INJECTION, SOLUTION INTRAMUSCULAR; INTRAVENOUS EVERY 2 HOUR PRN
Status: DISCONTINUED | OUTPATIENT
Start: 2024-03-09 | End: 2024-03-13

## 2024-03-09 RX ORDER — HYDROCODONE BITARTRATE AND ACETAMINOPHEN 5; 325 MG/1; MG/1
2 TABLET ORAL EVERY 4 HOURS PRN
Status: DISCONTINUED | OUTPATIENT
Start: 2024-03-09 | End: 2024-03-13

## 2024-03-09 RX ORDER — CARVEDILOL 12.5 MG/1
12.5 TABLET ORAL 2 TIMES DAILY WITH MEALS
Status: DISCONTINUED | OUTPATIENT
Start: 2024-03-10 | End: 2024-03-09

## 2024-03-09 RX ORDER — DROPERIDOL 2.5 MG/ML
2.5 INJECTION, SOLUTION INTRAMUSCULAR; INTRAVENOUS ONCE
Status: COMPLETED | OUTPATIENT
Start: 2024-03-09 | End: 2024-03-09

## 2024-03-09 RX ORDER — PREDNISONE 20 MG/1
20 TABLET ORAL 2 TIMES DAILY
Status: DISCONTINUED | OUTPATIENT
Start: 2024-03-09 | End: 2024-03-13

## 2024-03-09 RX ORDER — MORPHINE SULFATE 2 MG/ML
2 INJECTION, SOLUTION INTRAMUSCULAR; INTRAVENOUS EVERY 2 HOUR PRN
Status: DISCONTINUED | OUTPATIENT
Start: 2024-03-09 | End: 2024-03-13

## 2024-03-09 RX ORDER — CARVEDILOL 12.5 MG/1
12.5 TABLET ORAL 2 TIMES DAILY WITH MEALS
Status: DISCONTINUED | OUTPATIENT
Start: 2024-03-09 | End: 2024-03-13

## 2024-03-09 RX ORDER — POLYETHYLENE GLYCOL 3350 17 G/17G
17 POWDER, FOR SOLUTION ORAL DAILY PRN
Status: DISCONTINUED | OUTPATIENT
Start: 2024-03-09 | End: 2024-03-13

## 2024-03-09 RX ORDER — HEPARIN SODIUM 5000 [USP'U]/ML
5000 INJECTION, SOLUTION INTRAVENOUS; SUBCUTANEOUS EVERY 8 HOURS SCHEDULED
Status: DISCONTINUED | OUTPATIENT
Start: 2024-03-09 | End: 2024-03-13

## 2024-03-09 RX ORDER — NIFEDIPINE 30 MG/1
60 TABLET, EXTENDED RELEASE ORAL 2 TIMES DAILY
Status: DISCONTINUED | OUTPATIENT
Start: 2024-03-09 | End: 2024-03-13

## 2024-03-09 RX ORDER — HYDROCODONE BITARTRATE AND ACETAMINOPHEN 5; 325 MG/1; MG/1
2 TABLET ORAL ONCE
Status: COMPLETED | OUTPATIENT
Start: 2024-03-09 | End: 2024-03-09

## 2024-03-09 RX ORDER — TAMSULOSIN HYDROCHLORIDE 0.4 MG/1
0.4 CAPSULE ORAL
Status: DISCONTINUED | OUTPATIENT
Start: 2024-03-10 | End: 2024-03-13

## 2024-03-09 RX ORDER — HYDRALAZINE HYDROCHLORIDE 20 MG/ML
10 INJECTION INTRAMUSCULAR; INTRAVENOUS ONCE
Status: COMPLETED | OUTPATIENT
Start: 2024-03-09 | End: 2024-03-09

## 2024-03-09 RX ORDER — MORPHINE SULFATE 4 MG/ML
4 INJECTION, SOLUTION INTRAMUSCULAR; INTRAVENOUS ONCE
Status: COMPLETED | OUTPATIENT
Start: 2024-03-09 | End: 2024-03-09

## 2024-03-09 RX ORDER — PREGABALIN 75 MG/1
75 CAPSULE ORAL DAILY
Status: DISCONTINUED | OUTPATIENT
Start: 2024-03-09 | End: 2024-03-09

## 2024-03-09 RX ORDER — ACETAMINOPHEN 500 MG
500 TABLET ORAL EVERY 4 HOURS PRN
Status: DISCONTINUED | OUTPATIENT
Start: 2024-03-09 | End: 2024-03-13

## 2024-03-09 RX ORDER — SENNOSIDES 8.6 MG
17.2 TABLET ORAL NIGHTLY PRN
Status: DISCONTINUED | OUTPATIENT
Start: 2024-03-09 | End: 2024-03-13

## 2024-03-09 RX ORDER — LEVOTHYROXINE SODIUM 0.03 MG/1
25 TABLET ORAL
Status: DISCONTINUED | OUTPATIENT
Start: 2024-03-10 | End: 2024-03-13

## 2024-03-09 RX ORDER — HYDRALAZINE HYDROCHLORIDE 20 MG/ML
10 INJECTION INTRAMUSCULAR; INTRAVENOUS EVERY 4 HOURS PRN
Status: DISCONTINUED | OUTPATIENT
Start: 2024-03-09 | End: 2024-03-13

## 2024-03-09 RX ORDER — CLOPIDOGREL BISULFATE 75 MG/1
75 TABLET ORAL DAILY
Status: DISCONTINUED | OUTPATIENT
Start: 2024-03-10 | End: 2024-03-13

## 2024-03-09 RX ORDER — MORPHINE SULFATE 4 MG/ML
4 INJECTION, SOLUTION INTRAMUSCULAR; INTRAVENOUS EVERY 2 HOUR PRN
Status: DISCONTINUED | OUTPATIENT
Start: 2024-03-09 | End: 2024-03-13

## 2024-03-09 NOTE — ED NOTES
Received pt a/ox4, clear speech, nad, no resp distress. Moaning and yelling in pain     Here with c/o tailbone pain s/p fall on Wednesday and  atraumatic pain to L humerus onset today    Denies numbness and tingling. Denies incontinence of bowel and urine    Reports recent dx of Guillain Barr Syndrome    Placed on all continuous monitors, vss  20G piv placed to R hand, flushes well, no s/s of infiltration noted    stable

## 2024-03-09 NOTE — ED PROVIDER NOTES
Patient Seen in: [facility] Emergency Department    History     Chief Complaint   Patient presents with    Fall    Pain       HPI    68-year-old male with a history of hypertension, recent admission for Guillain-Barré syndrome though by discharge the patient had much improving muscle strength.  The patient reports that he was using his walker today when he felt that his legs gave out on him and fell straight down onto his sacrum which is where his pain is located with radiation down his left leg.  He denies any focal weakness or numbness or paresthesias though reports significant pain when lifting his left hip.  Denies any bowel or bladder incontinence.    History reviewed.   Past Medical History:   Diagnosis Date    PIERCE (acute kidney injury) ([facility]) 9/23/2021    Arthritis     CAD (coronary artery disease)     High cholesterol     Neuropathy     Retinal pigmentation     Spine fracture 1998    L3, L4, L5.  Rods placed    Thyroid disease     Ulcer     Unspecified essential hypertension        History reviewed.   Past Surgical History:   Procedure Laterality Date    ANGIOPLASTY (CORONARY)  2012    with stent placement x 1    BACK SURGERY      EXCIS JT LINING,PROX I-P JT,EACH Right 10/14/2015    Procedure: ARTHROTOMY OF FINGER JOINT INTERPHALANGEAL;  Surgeon: Olvin Vences MD;  Location: [facility]    EXCIS JT LINING,PROX I-P JT,EACH Right 10/14/2015    Procedure: ARTHROTOMY OF FINGER JOINT INTERPHALANGEAL;  Surgeon: Olvin Vences MD;  Location: [facility]    OTHER SURGICAL HISTORY  2000,2002, 2004, 2006    back surgery     OTHER SURGICAL HISTORY  1982    \"ulcer surgery\"    OTHER SURGICAL HISTORY Right 10/14/15    right arthrotomy, debridement, synovectomy of the joints w/biopsy right index and long finger MP joint    PATIENT DOCUMENTED NOT TO HAVE EXPERIENCED ANY OF THE FOLLOWING EVENTS Right 10/14/2015    Procedure: ARTHROTOMY OF FINGER JOINT INTERPHALANGEAL;  Surgeon: Olvin Vences MD;  Location:  [facility]    PATIENT WITH PREOPERATIVE ORDER FOR IV ANTIBIOTIC SURGICAL SITE INFECT Right 10/14/2015    Procedure: ARTHROTOMY OF FINGER JOINT INTERPHALANGEAL;  Surgeon: Olvin Vences MD;  Location: [facility]         Medications :  (Not in a hospital admission)       Family History   Problem Relation Age of Onset    Hypertension Father     Cancer Father         prostate cancer    Hypertension Mother     Diabetes Sister        Smoking Status:   Social History     Socioeconomic History    Marital status:    Tobacco Use    Smoking status: Never    Smokeless tobacco: Never   Vaping Use    Vaping Use: Never used   Substance and Sexual Activity    Alcohol use: No    Drug use: No       Constitutional and vital signs reviewed.      Social History and Family History elements reviewed from today, pertinent positives to the presenting problem noted.    Physical Exam     ED Triage Vitals [03/09/24 1422]   BP (!) 196/84   Pulse 80   Resp 18   Temp 97.7 °F (36.5 °C)   Temp src Oral   SpO2 96 %   O2 Device None (Room air)       All measures to prevent infection transmission during my interaction with the patient were taken. The patient was already wearing a droplet mask on my arrival to the room. Personal protective equipment was worn throughout the duration of the exam.  Handwashing was performed prior to and after the exam.  Stethoscope and any equipment used during my examination was cleaned with super sani-cloth germicidal wipes following the exam.     Physical Exam    General: In distress   Head: Normocephalic and atraumatic.  Mouth/Throat/Ears/Nose: No hoarseness of voice  Eyes: Conjunctivae and EOM are normal.  Neck: Normal range of motion. Supple.   Cardiovascular: Normal rate, regular rhythm  Respiratory/Chest: No tachypnea.  Clear breath sounds  Gastrointestinal: Nondistended    Musculoskeletal:No swelling or deformity.  There is mild tenderness at the left mid humerus, sacral midline tenderness.   Bilateral 5 out of 5 knee extension, plantarflexion strength.  Sensation intact to light touch.  Exquisite discomfort limiting left hip flexion    Neurological: Alert and appropriate.   Skin: Skin is warm and dry. No pallor.  Psychiatric: Has a normal mood and affect.      ED Course        Labs Reviewed   RAINBOW DRAW LAVENDER   RAINBOW DRAW LIGHT GREEN   RAINBOW DRAW BLUE   RAINBOW DRAW GOLD       As Interpreted by me    Imaging Results Available and Reviewed while in ED: XR LUMBAR SPINE (MIN 2 VIEWS) (CPT=72100)    Result Date: 3/9/2024  CONCLUSION:  1. No fracture or dislocation. 2. Stable postoperative changes from a remote laminectomy and posterior fusion at L3-S1.  No evidence of hardware failure. 3. Stable mild spondylotic changes in the upper lumbar spine.    Dictated by (CST): Krzysztof Myles MD on 3/09/2024 at 4:13 PM     Finalized by (CST): Krzysztof Myles MD on 3/09/2024 at 4:16 PM         ED Medications Administered:   Medications   HYDROcodone-acetaminophen (Norco) 5-325 MG per tab 2 tablet (2 tablets Oral Given 3/9/24 1615)   morphINE PF 4 MG/ML injection 4 mg (4 mg Intravenous Given 3/9/24 1712)   droperidol (Inapsine) 2.5 mg/mL injection 2.5 mg (2.5 mg Intravenous Given 3/9/24 1712)         MDM     Vitals:    03/09/24 1422 03/09/24 1545 03/09/24 1600 03/09/24 1700   BP: (!) 196/84 (!) 203/71 (!) 188/62 (!) 205/79   Pulse: 80 74 76 64   Resp: 18 18 21 24   Temp: 97.7 °F (36.5 °C)      TempSrc: Oral      SpO2: 96% 100% 100% 100%   Weight: 69.4 kg        *I personally reviewed and interpreted all ED vitals.    Pulse Ox: 100%, Room air, Normal       Medical Decision Making      Differential Diagnosis/ Diagnostic Considerations: Lumbosacral fracture, contusion, lumbosacral strain, cauda equina    Complicating Factors: The patient already has does not have any pertinent problems on file. to contribute to the complexity of this ED evaluation.    I reviewed prior chart records including most recent  discharge summary from February 25, 2024.  Patient here with severe low back pain without neurologic deficits on my evaluation to suggest cauda equina.  X-ray of the lumbar spine without fracture on my interpretation, given chronic steroid use, planning on CT lumbar spine.  Also noted to have left mid humeral pain, x-ray pending at time of signout.  Plan for discharge if imaging is unremarkable, patient is able to ambulate and pain is controlled    Disposition and Plan     Clinical Impression:  1. Acute left-sided low back pain with left-sided sciatica    2. Pain of left humerus        Disposition:  There is no disposition on file for this visit.    Follow-up:  No follow-up provider specified.    Medications Prescribed:  Current Discharge Medication List

## 2024-03-09 NOTE — ED INITIAL ASSESSMENT (HPI)
Patient had fall recently at home and fell onto buttocks. Recent admission for strain of lumbar on 2/25    Left tailbone pain radiates left leg. Notes this is the same pain as before. Patient notes worsening since fall while walking with walker    Norco at 8am

## 2024-03-10 LAB
ANION GAP SERPL CALC-SCNC: 6 MMOL/L (ref 0–18)
BASOPHILS # BLD: 0 X10(3) UL (ref 0–0.2)
BASOPHILS NFR BLD: 0 %
BUN BLD-MCNC: 27 MG/DL (ref 9–23)
BUN/CREAT SERPL: 19.1 (ref 10–20)
CALCIUM BLD-MCNC: 8.4 MG/DL (ref 8.7–10.4)
CHLORIDE SERPL-SCNC: 112 MMOL/L (ref 98–112)
CO2 SERPL-SCNC: 22 MMOL/L (ref 21–32)
CREAT BLD-MCNC: 1.41 MG/DL
DEPRECATED RDW RBC AUTO: 55 FL (ref 35.1–46.3)
EGFRCR SERPLBLD CKD-EPI 2021: 55 ML/MIN/1.73M2 (ref 60–?)
EOSINOPHIL # BLD: 0 X10(3) UL (ref 0–0.7)
EOSINOPHIL NFR BLD: 0 %
ERYTHROCYTE [DISTWIDTH] IN BLOOD BY AUTOMATED COUNT: 19.9 % (ref 11–15)
GLUCOSE BLD-MCNC: 103 MG/DL (ref 70–99)
HCT VFR BLD AUTO: 32.1 %
HGB BLD-MCNC: 10.6 G/DL
LYMPHOCYTES NFR BLD: 1.1 X10(3) UL (ref 1–4)
LYMPHOCYTES NFR BLD: 8 %
MAGNESIUM SERPL-MCNC: 2.2 MG/DL (ref 1.6–2.6)
MCH RBC QN AUTO: 27 PG (ref 26–34)
MCHC RBC AUTO-ENTMCNC: 33 G/DL (ref 31–37)
MCV RBC AUTO: 81.9 FL
METAMYELOCYTES # BLD: 0.27 X10(3) UL
METAMYELOCYTES NFR BLD: 2 %
MONOCYTES # BLD: 0.14 X10(3) UL (ref 0.1–1)
MONOCYTES NFR BLD: 1 %
NEUTROPHILS # BLD AUTO: 10.14 X10 (3) UL (ref 1.5–7.7)
NEUTROPHILS NFR BLD: 87 %
NEUTS BAND NFR BLD: 1 %
NEUTS HYPERSEG # BLD: 12.06 X10(3) UL (ref 1.5–7.7)
NRBC BLD MANUAL-RTO: 2 %
OSMOLALITY SERPL CALC.SUM OF ELEC: 295 MOSM/KG (ref 275–295)
PAPPENHEIMER BOD BLD QL SMEAR: PRESENT
PLATELET # BLD AUTO: 185 10(3)UL (ref 150–450)
PLATELET MORPHOLOGY: NORMAL
POTASSIUM SERPL-SCNC: 4.3 MMOL/L (ref 3.5–5.1)
PROMYELOCYTES # BLD: 0.14 X10(3) UL
PROMYELOCYTES NFR BLD: 1 %
RBC # BLD AUTO: 3.92 X10(6)UL
SODIUM SERPL-SCNC: 140 MMOL/L (ref 136–145)
TOTAL CELLS COUNTED BLD: 100
WBC # BLD AUTO: 13.7 X10(3) UL (ref 4–11)

## 2024-03-10 PROCEDURE — 99233 SBSQ HOSP IP/OBS HIGH 50: CPT | Performed by: INTERNAL MEDICINE

## 2024-03-10 RX ORDER — CYCLOBENZAPRINE HCL 10 MG
10 TABLET ORAL 3 TIMES DAILY PRN
Status: DISCONTINUED | OUTPATIENT
Start: 2024-03-10 | End: 2024-03-13

## 2024-03-10 RX ORDER — LABETALOL HYDROCHLORIDE 5 MG/ML
10 INJECTION, SOLUTION INTRAVENOUS EVERY 4 HOURS PRN
Status: DISCONTINUED | OUTPATIENT
Start: 2024-03-10 | End: 2024-03-13

## 2024-03-10 NOTE — H&P
[facility]  part of [facility]  HISTORY AND PHYSICAL       Maximo Diggs Jr. Patient Status:  Emergency    1955  68 year old CSN [id number]   Location 43/43 Attending Milton Amado MD     PCP Lito Pedroza MD         DATE OF ADMISSION: 3/9/2024     CHIEF COMPLAINT: Fall    HISTORY OF PRESENT ILLNESS  This is a 68 year oldmale who presented after fall at home.  Patient stated he was previously diagnosed with Guillain-Barré syndrome.  Since that time has had persistent weakness in his lower extremities.  Patient states he was working with physical therapy.  After returning home, he went to exercise in his home when he felt his legs become weak causing him to fall.  Patient stated he fell backwards.  Patient denied head trauma or loss of consciousness.  After the fall he had increased pain in left lower back.  Patient denied new numbness or tingling.  Patient denied loss of bowel or bladder control.  Patient reported pain was increased with even minimal movements.  He was attempted to be pain controlled in the ED, but was unable to ambulate.  Patient was recommended for admission for increased pain management and PT/OT.  At time of interview, patient otherwise denied chest pain, shortness of breath, abdominal pain, nausea vomit, fever or chills.    PAST MEDICAL HISTORY  Past Medical History:   Diagnosis Date    PIERCE (acute kidney injury) ([facility]) 2021    Arthritis     CAD (coronary artery disease)     High cholesterol     Neuropathy     Retinal pigmentation     Spine fracture     L3, L4, L5.  Rods placed    Thyroid disease     Ulcer     Unspecified essential hypertension         PAST SURGICAL HISTORY  Past Surgical History:   Procedure Laterality Date    ANGIOPLASTY (CORONARY)      with stent placement x 1    BACK SURGERY      EXCIS JT LINING,PROX I-P JT,EACH Right 10/14/2015    Procedure: ARTHROTOMY OF FINGER JOINT INTERPHALANGEAL;  Surgeon: Olvin Vences MD;   Location: [facility]    EXCIS JT LINING,PROX I-P JT,EACH Right 10/14/2015    Procedure: ARTHROTOMY OF FINGER JOINT INTERPHALANGEAL;  Surgeon: Olvin Vences MD;  Location: [facility]    OTHER SURGICAL HISTORY  2000,2002, 2004, 2006    back surgery     OTHER SURGICAL HISTORY  1982    \"ulcer surgery\"    OTHER SURGICAL HISTORY Right 10/14/15    right arthrotomy, debridement, synovectomy of the joints w/biopsy right index and long finger MP joint    PATIENT DOCUMENTED NOT TO HAVE EXPERIENCED ANY OF THE FOLLOWING EVENTS Right 10/14/2015    Procedure: ARTHROTOMY OF FINGER JOINT INTERPHALANGEAL;  Surgeon: Olvin Vences MD;  Location: [facility]    PATIENT WITH PREOPERATIVE ORDER FOR IV ANTIBIOTIC SURGICAL SITE INFECT Right 10/14/2015    Procedure: ARTHROTOMY OF FINGER JOINT INTERPHALANGEAL;  Surgeon: Olvin Vences MD;  Location: [facility]       ALLERGIES   Aspirin, Clavulanic acid, Losartan, Penicillins, Seafood, Iodine (topical), Pregabalin, and Seasonal    MEDICATIONS  Patient's Medications   New Prescriptions    No medications on file   Previous Medications    ALENDRONATE 70 MG ORAL TAB    Take 1 tablet (70 mg total) by mouth once a week. On SUNDAYS only    ALFUZOSIN ER 10 MG ORAL TABLET 24 HR    Take 1 tablet (10 mg total) by mouth daily.    BISOPROLOL FUMARATE (ZEBETA) 5 MG ORAL TAB    Take 1 tablet (5 mg total) by mouth in the morning and 1 tablet (5 mg total) before bedtime.    CLOPIDOGREL 75 MG ORAL TAB    Take 1 tablet (75 mg total) by mouth daily.    COENZYME Q10 (COQ10) 100 MG ORAL CAP    Take 100 mg by mouth in the morning and 100 mg before bedtime.    CYCLOBENZAPRINE 10 MG ORAL TAB    Take 1 tablet (10 mg total) by mouth 3 (three) times daily as needed for Muscle spasms.    ESOMEPRAZOLE MAGNESIUM 20 MG ORAL CAPSULE DELAYED RELEASE    Take 1 capsule (20 mg total) by mouth every morning before breakfast.    HYDRALAZINE 100 MG ORAL TAB    Take 1 tablet (100 mg total) by mouth every 8 (eight)  hours.    HYDROCODONE-ACETAMINOPHEN 5-325 MG ORAL TAB    Take 1 tablet by mouth every 6 (six) hours as needed for Pain.    LEVOTHYROXINE SODIUM (SYNTHROID, LEVOTHROID) 25 MCG ORAL TAB    Take 1 tablet (25 mcg total) by mouth before breakfast.    NIFEDIPINE 60 MG ORAL TABLET 24 HR    Take 60 mg by mouth 2 (two) times a day.    ONDANSETRON 4 MG ORAL TABLET DISPERSIBLE    Take 1 tablet (4 mg total) by mouth every 8 (eight) hours as needed for Nausea.    PREDNISONE 10 MG ORAL TAB    Take 1 tablet (10 mg total) by mouth 2 (two) times daily.    PREGABALIN 75 MG ORAL CAP    Take 1 capsule (75 mg total) by mouth daily.    VITAMIN D3 1000 UNITS ORAL TAB    Take 1 tablet (1,000 Units total) by mouth daily.   Modified Medications    No medications on file   Discontinued Medications    No medications on file       SOCIAL HISTORY  Social History     Socioeconomic History    Marital status:    Tobacco Use    Smoking status: Never    Smokeless tobacco: Never   Vaping Use    Vaping Use: Never used   Substance and Sexual Activity    Alcohol use: No    Drug use: No       FAMILY HISTORY  Family History   Problem Relation Age of Onset    Hypertension Father     Cancer Father         prostate cancer    Hypertension Mother     Diabetes Sister        PHYSICAL EXAM  Vital signs:  BP (!) 209/65   Pulse 66   Temp 97.7 °F (36.5 °C) (Oral)   Resp 16   Wt 153 lb (69.4 kg)   SpO2 98%   BMI 25.46 kg/m²      GENERAL:  Awake and alert, in no acute distress.  HEART:  Regular rhythm.  Regular rate   LUNGS:  Air entry was good.  No crackles or wheezes   ABDOMEN: Soft and non-tender. MUSCULOSKELETAL: Decreased range of motion of left lower extremity due to pain.  EXTREMITIES: No edema appreciated  PSYCHIATRIC: Normal mood      IMAGING  CT SPINE LUMBAR (CPT=72131)    Result Date: 3/9/2024  CONCLUSION:  1. No fracture or dislocation. 2. Stable postoperative changes from a remote laminectomy, interbody fusion and posterior fusion at L3-S1  including posterior instrumented fusion at L3-L4.  No evidence of hardware failure. 3. Bilateral neural foraminal narrowing at L3-L4 and L4-L5. 4. Residual spondylotic changes at L1-L2 result in mild central vertebral canal stenosis and bilateral neural foraminal stenosis. 5. Stable mild spondylotic changes in the upper lumbar spine.     Dictated by (CST): Krzysztof Myles MD on 3/09/2024 at 6:16 PM     Finalized by (CST): Krzysztof Myles MD on 3/09/2024 at 6:22 PM          XR HUMERUS (MIN 2 VIEWS), LEFT (CPT=73060)    Result Date: 3/9/2024  CONCLUSION:  1. No fracture dislocation. 2. Mild left acromioclavicular and ulnotrochlear joint osteoarthritis.    Dictated by (CST): Krzysztof Myles MD on 3/09/2024 at 6:10 PM     Finalized by (CST): Krzysztof Myles MD on 3/09/2024 at 6:11 PM          XR LUMBAR SPINE (MIN 2 VIEWS) (CPT=72100)    Result Date: 3/9/2024  CONCLUSION:  1. No fracture or dislocation. 2. Stable postoperative changes from a remote laminectomy and posterior fusion at L3-S1.  No evidence of hardware failure. 3. Stable mild spondylotic changes in the upper lumbar spine.    Dictated by (CST): Krzysztof Myles MD on 3/09/2024 at 4:13 PM     Finalized by (CST): Krzysztof Myles MD on 3/09/2024 at 4:16 PM           Data:  No results for input(s): \"RBC\", \"HGB\", \"HCT\", \"MCV\", \"MCH\", \"MCHC\", \"RDW\", \"NEPRELIM\", \"WBC\", \"PLT\" in the last 168 hours.  No results for input(s): \"GLU\", \"BUN\", \"CREATSERUM\", \"GFRAA\", \"GFRNAA\", \"CA\", \"ALB\", \"NA\", \"K\", \"CL\", \"CO2\", \"ALKPHO\", \"AST\", \"ALT\", \"BILT\", \"TP\" in the last 168 hours.    ASSESSMENT/PLAN      Acute left-sided low back pain with left-sided sciatica  -Likely 2/2 to fall  -Pt with hx of weakness from GBS  -Imaging without signs of acute fx or malpositioning   -Pain control as able  -PT/OT eval    Accelerated HTN  -bp grossly elevated  -Start coreg, hydralazine, nifedipine  -PRN IV hydralazine  -Cont to monitor     Hypothyroid  -Restart home  levothyroxine      Plan of care discussed with patient and family at bedside.  Discussed with ED physician and RN. Decision made that pt needs hospitalization for further management/monitoring.      Tawanda Felix MD    This note was prepared using Dragon Medical voice recognition dictation software. As a result errors may occur. When identified these errors have been corrected. While every attempt is made to correct errors during dictation discrepancies may still exist

## 2024-03-10 NOTE — CM/SW NOTE
Per chart review:     Anticipated therapy need: Intensive Therapy Program    PMR consult placed by MD.     Tentative referrals sent in aidin.     Marysol Platt, RN, BSN

## 2024-03-10 NOTE — PROGRESS NOTES
[facility]  part of [facility]     Hospitalist Progress Note     Maximo Diggs Jr. Patient Status:  Observation    1955 MRN [id number]   Location [facility] 5SW/SE Attending Aixa Orr MD   Hosp Day # 0 PCP Lito Pedroza MD     Subjective:     Patient laying comfortably in bed and in NAD. Denied any active complaints at the time of interview apart from low back pain.   Looking forward to working with PT and OT.       Objective:    Review of Systems:   ROS completed; pertinent positive and negatives stated in subjective.      Vital signs:  Temp:  [97.5 °F (36.4 °C)-98.1 °F (36.7 °C)] 98.1 °F (36.7 °C)  Pulse:  [64-86] 78  Resp:  [16-24] 16  BP: (154-212)/(48-84) 154/48  SpO2:  [96 %-100 %] 97 %      Physical Exam:    Gen: NAD AO x3  Chest: good air entry CTABL  CVS: normal s1 and s2 RR  Abd: NABS soft NT ND  Neuro: CN 2-12 grossly intact  Ext: no edema in bilateral LE      Diagnostic Data:    Labs:  Recent Labs   Lab 03/10/24  0547   WBC 13.7*   HGB 10.6*   MCV 81.9   .0   BAND 1       Recent Labs   Lab 03/10/24  0547   *   BUN 27*   CREATSERUM 1.41*   CA 8.4*      K 4.3      CO2 22.0       Estimated Creatinine Clearance: 44.2 mL/min (A) (based on SCr of 1.41 mg/dL (H)).    No results for input(s): \"PTP\", \"INR\" in the last 168 hours.           Imaging: Imaging data reviewed in Epic.    Medications:    tamsulosin  0.4 mg Oral Daily @ 0700    clopidogrel  75 mg Oral Daily    pantoprazole  40 mg Oral QAM AC    hydrALAZINE  100 mg Oral Q8H FERNANDO    levothyroxine  25 mcg Oral Before breakfast    NIFEdipine ER  60 mg Oral BID    heparin  5,000 Units Subcutaneous Q8H FERNANDO    predniSONE  20 mg Oral BID    carvedilol  12.5 mg Oral BID with meals       Assessment & Plan:     Acute left sided low back pain with left sided sciatica  Likely 2/2 fall  Imaging reviewed  Pain control PRN  PT/OT  HTN  BP elevated on admission  Continue home meds  Hydralazine  PRN  Monitor vitals  Hypothyroidism  Continue home meds  CAD  Continue home meds      Plan of care discussed with patient or family at bedside.      Supplementary Documentation:     Quality:  DVT Prophylaxis: heparin s/c  CODE status: Full      Estimated date of discharge: TBD  Discharge is dependent on: clinical stability  At this point Mr. Diggs is expected to be discharge to: home                  Aixa Orr MD  Hospitalist    MDM: High, I personally reviewed the available laboratories, imaging including CT, XR. I discussed the case with RN. I ordered laboratories, studies including AM labs.  Medical decision making high, risk is high.       The 21st Century Cures Act makes medical notes like these available to patients in the interest of transparency. Please be advised this is a medical document. Medical documents are intended to carry relevant information, facts as evident, and the clinical opinion of the practitioner. The medical note is intended as peer to peer communication and may appear blunt or direct. It is written in medical language and may contain abbreviations or verbiage that are unfamiliar.

## 2024-03-10 NOTE — PLAN OF CARE
Problem: Patient Centered Care  Goal: Patient preferences are identified and integrated in the patient's plan of care  Description: Interventions:  - What would you like us to know as we care for you?   - Provide timely, complete, and accurate information to patient/family  - Incorporate patient and family knowledge, values, beliefs, and cultural backgrounds into the planning and delivery of care  - Encourage patient/family to participate in care and decision-making at the level they choose  - Honor patient and family perspectives and choices  Outcome: Progressing     Problem: MUSCULOSKELETAL - ADULT  Goal: Return mobility to safest level of function  Description: INTERVENTIONS:  - Assess patient stability and activity tolerance for standing, transferring and ambulating w/ or w/o assistive devices  - Assist with transfers and ambulation using safe patient handling equipment as needed  - Ensure adequate protection for wounds/incisions during mobilization  - Obtain PT/OT consults as needed  - Advance activity as appropriate  - Communicate ordered activity level and limitations with patient/family  Outcome: Progressing     Problem: Impaired Functional Mobility  Goal: Achieve highest/safest level of mobility/gait  Description: Interventions:  - Assess patient's functional ability and stability  - Promote increasing activity/tolerance for mobility and gait  - Educate and engage patient/family in tolerated activity level and precautions  Outcome: Progressing     Problem: PAIN - ADULT  Goal: Verbalizes/displays adequate comfort level or patient's stated pain goal  Description: INTERVENTIONS:  - Encourage pt to monitor pain and request assistance  - Assess pain using appropriate pain scale  - Administer analgesics based on type and severity of pain and evaluate response  - Implement non-pharmacological measures as appropriate and evaluate response  - Consider cultural and social influences on pain and pain management  -  Manage/alleviate anxiety  - Utilize distraction and/or relaxation techniques  - Monitor for opioid side effects  - Notify MD/LIP if interventions unsuccessful or patient reports new pain  - Anticipate increased pain with activity and pre-medicate as appropriate  Outcome: Progressing     Problem: SAFETY ADULT - FALL  Goal: Free from fall injury  Description: INTERVENTIONS:  - Assess pt frequently for physical needs  - Identify cognitive and physical deficits and behaviors that affect risk of falls.  - [place] fall precautions as indicated by assessment.  - Educate pt/family on patient safety including physical limitations  - Instruct pt to call for assistance with activity based on assessment  - Modify environment to reduce risk of injury  - Provide assistive devices as appropriate  - Consider OT/PT consult to assist with strengthening/mobility  - Encourage toileting schedule  Outcome: Progressing     Problem: DISCHARGE PLANNING  Goal: Discharge to home or other facility with appropriate resources  Description: INTERVENTIONS:  - Identify barriers to discharge w/pt and caregiver  - Include patient/family/discharge partner in discharge planning  - Arrange for needed discharge resources and transportation as appropriate  - Identify discharge learning needs (meds, wound care, etc)  - Arrange for interpreters to assist at discharge as needed  - Consider post-discharge preferences of patient/family/discharge partner  - Complete POLST form as appropriate  - Assess patient's ability to be responsible for managing their own health  - Refer to Case Management Department for coordinating discharge planning if the patient needs post-hospital services based on physician/LIP order or complex needs related to functional status, cognitive ability or social support system  Outcome: Progressing     Patient alert and oriented x4.  Pain managed with PRN Norco and IV Morphine.  Call light within reach.  Hydralazine IV for high BP.   Safety precautions in place.

## 2024-03-10 NOTE — PHYSICAL THERAPY NOTE
PHYSICAL THERAPY EVALUATION - INPATIENT     Room Number: 536/536-A  Evaluation Date: 3/10/2024  Type of Evaluation: Initial   Physician Order: PT Eval and Treat    Presenting Problem: weakness, falls  Co-Morbidities : PIERCE, CAD, neuropathy  Reason for Therapy: Mobility Dysfunction and Discharge Planning    PHYSICAL THERAPY ASSESSMENT   Patient is a 68 year old male admitted 3/9/2024 for falls.  Prior to admission, patient's baseline is independent with performance of all ADL's and performance of functional mobility with a cane.  Patient is currently functioning below baseline with bed mobility, transfers, gait, stair negotiation, standing prolonged periods, and performing household tasks.  Patient is requiring minimal assist as a result of the following impairments: decreased functional strength, decreased endurance/aerobic capacity, decreased muscular endurance, and medical status.  Physical Therapy will continue to follow for duration of hospitalization.    Patient will benefit from continued skilled PT Services to facilitate return to prior level of function as patient demonstrates high motivation with excellent tolerance to an intensive therapy program .    PLAN  PT Treatment Plan: Bed mobility;Body mechanics;Endurance;Energy conservation;Neuromuscular re-educate;Strengthening;Stair training;Transfer training;Balance training  Rehab Potential : Good  Frequency (Obs): 5x/week    PHYSICAL THERAPY MEDICAL/SOCIAL HISTORY   History related to current admission: This is a 68 year oldmale who presented after fall at home.  Patient stated he was previously diagnosed with Guillain-Barré syndrome.  Since that time has had persistent weakness in his lower extremities.      Problem List  Principal Problem:    Acute left-sided low back pain with left-sided sciatica  Active Problems:    Pain of left humerus      HOME SITUATION  Home Situation  Type of Home: House  Home Layout: Able to live on main level  Stairs to Enter : 6  (4, landing, 2)  Lives With: Spouse  Drives: No  Patient Owned Equipment: Rolling walker;Cane  Patient Regularly Uses: Glasses     Prior Level of [place]: Prior to admission patient was independent with performance of all ADL's and performance of functional mobility with UE support on cane. Patient reports due to weakness has been needing to utilize RW most recently. Patient reports an active lifestyle, enjoys walking at [facility] with spouse. Just started OP PT program. Reports >3 falls in the last 3 months.     SUBJECTIVE  \"I'm just a little nervous, I don't want to fall again\"    PHYSICAL THERAPY EXAMINATION   OBJECTIVE     Fall Risk: Standard fall risk    WEIGHT BEARING RESTRICTION  Weight Bearing Restriction: None     PAIN ASSESSMENT  [place]          COGNITION  Overall Cognitive Status:  WFL - within functional limits  Arousal/Alertness:  appropriate responses to stimuli  Orientation Level:  oriented x4  Safety Judgement:  good awareness of safety precautions    RANGE OF MOTION AND STRENGTH ASSESSMENT  Upper extremity ROM and strength are within functional limits BUE.  Lower extremity ROM is within functional limits BLE.  Lower extremity strength is within functional limits (L slightly weaker than R)  Right Hip flexion  4-/5  Left Hip flexion  3+/5  Right Knee extension  4/5  Left Knee extension  4-/5  Right Knee flexion  4/5  Left Knee flexion  4-/5  Right Dorsiflexion  4/5  Left Dorsiflexion  4-/5    BALANCE  Static Sitting: Good  Dynamic Sitting: Fair  Static Standing: Fair  Dynamic Standing: Poor    ACTIVITY TOLERANCE  Pulse: 84  Heart Rate Source: Monitor     BP: (!) 161/55  BP Location: Right arm  BP Method: Automatic  Patient Position: Sitting    O2 WALK  Oxygen Therapy  SPO2% on Room Air at Rest: 97    AM-PAC '6-Clicks' INPATIENT SHORT FORM - BASIC MOBILITY  How much difficulty does the patient currently have...  Patient Difficulty: Turning over in bed (including adjusting bedclothes,  sheets and blankets)?: A Little   Patient Difficulty: Sitting down on and standing up from a chair with arms (e.g., wheelchair, bedside commode, etc.): A Little   Patient Difficulty: Moving from lying on back to sitting on the side of the bed?: A Little   How much help from another person does the patient currently need...   Help from Another: Moving to and from a bed to a chair (including a wheelchair)?: A Little   Help from Another: Need to walk in hospital room?: A Little   Help from Another: Climbing 3-5 steps with a railing?: A Lot     AM-PAC Score:  Raw Score: 17   Approx Degree of Impairment: 50.57%   Standardized Score (AM-PAC Scale): 42.13   CMS Modifier (G-Code): CK    FUNCTIONAL ABILITY STATUS  Functional Mobility/Gait Assessment  Gait Assistance: Minimum assistance  Distance (ft): 3 feet x 1, 30 feet x 1  Assistive Device: Rolling walker  Pattern:  (decreased magdaleno, decreased step length, forward flexed posture, narrow base of support, verbal cues for sequencing and rolling walker management.)  Rolling: contact guard assist  Supine to Sit: contact guard assist  Sit to Supine:  not tested  Sit to Stand: minimal assist    Exercise/Education Provided:  Bed mobility  Energy conservation  Functional activity tolerated  Gait training  Neuromuscular re-educate  Posture  Transfer training    The patient's Approx Degree of Impairment: 50.57% has been calculated based on documentation in the [facility] '6 clicks' Inpatient Basic Mobility Short Form.  Research supports that patients with this level of impairment may benefit from acute inpatient rehab.  Final disposition will be made by interdisciplinary medical team.    Patient End of Session: Up in chair;Needs met;Call light within reach;RN aware of session/findings;All patient questions and concerns addressed;Alarm set    CURRENT GOALS  Goals to be met by: 3/24/24  Patient Goal Patient's self-stated goal is: to return to PLOF   Goal #1 Patient is able to demonstrate  supine - sit EOB @ level: independent     Goal #1   Current Status    Goal #2 Patient is able to demonstrate transfers Sit to/from Stand at assistance level: modified independent with  least restrictive device     Goal #2  Current Status    Goal #3 Patient is able to ambulate >100 feet with assist device:  least restrictive device  at assistance level: modified independent   Goal #3   Current Status    Goal #4 Patient will negotiate 6 stairs/one curb w/ assistive device and supervision   Goal #4   Current Status    Goal #5 Patient to demonstrate independence with home activity/exercise instructions provided to patient in preparation for discharge.   Goal #5   Current Status    Goal #6    Goal #6  Current Status      Patient Evaluation Complexity Level:  History High - 3 or more personal factors and/or co-morbidities   Examination of body systems Moderate - addressing a total of 3 or more elements   Clinical Presentation  Moderate - Evolving   Clinical Decision Making  Moderate Complexity     Gait Training: 10 minutes    Kristi Patrick, PT, DPT

## 2024-03-10 NOTE — CM/SW NOTE
03/10/24 1600   CM/AJIT Referral Data   Referral Source    Reason for Referral Discharge planning   Informant Patient   Readmission Assessment   Factors that patient feels contributed to this readmission Acute/Chronic Clinical Presentation   Pt's living situation prior to admission? Home with family   Pt's level of independence at discharge? No assist/independent (minimal)   Pt. received education on diagnoses at time of discharge? Yes   Did you know who and how to call someone if you felt worse? Yes   Did any new symptoms or issues develop after you were discharged? No   Did you understand your discharge instructions? Yes   Were medications taken as indicated on discharge instructions? Yes   Did you have a follow-up appointment scheduled at time of discharge? Yes   Was full assessment completed by SW/YANELI on prior admission? No (comment)   Medical Hx   Does patient have an established PCP? Yes  (Dr Jay Pedroza)   Patient Info   Patient's Current Mental Status at Time of Assessment Alert;Oriented   Patient's Home Environment House   Number of Levels in Home 2   Number of Stair in Home 6   Patient lives with Spouse/Significant other   Patient Status Prior to Admission   Independent with ADLs and Mobility Yes   Discharge Needs   Anticipated D/C needs Acute rehab       Anticipated therapy need: Intensive Therapy Program    CM self referred to patient and obtained above information from patient. Patient agreeable to acute rehab. Discussed tentative referrals pending and PMR consult to be completed.     Plan: Acute rehab pending PMR consult/list/choice    Marysol Platt RN, BSN

## 2024-03-10 NOTE — PLAN OF CARE
Patient alert and oriented. PMR consult. PT recommends AR. No acute changes at this time. Fall precautions in place. Call light within reach.   Problem: Patient Centered Care  Goal: Patient preferences are identified and integrated in the patient's plan of care  Description: Interventions:  - What would you like us to know as we care for you? From home with wife  - Provide timely, complete, and accurate information to patient/family  - Incorporate patient and family knowledge, values, beliefs, and cultural backgrounds into the planning and delivery of care  - Encourage patient/family to participate in care and decision-making at the level they choose  - Honor patient and family perspectives and choices  Outcome: Progressing     Problem: Patient/Family Goals  Goal: Patient/Family Long Term Goal  Description: Patient's Long Term Goal: go home    Interventions:  - discharge planning  -monitor labs and VS  - See additional Care Plan goals for specific interventions  Outcome: Progressing  Goal: Patient/Family Short Term Goal  Description: Patient's Short Term Goal: pain relief    Interventions:   - monitor and assess pain  -Prn pain meds as ordered  - See additional Care Plan goals for specific interventions  Outcome: Progressing     Problem: MUSCULOSKELETAL - ADULT  Goal: Return mobility to safest level of function  Description: INTERVENTIONS:  - Assess patient stability and activity tolerance for standing, transferring and ambulating w/ or w/o assistive devices  - Assist with transfers and ambulation using safe patient handling equipment as needed  - Ensure adequate protection for wounds/incisions during mobilization  - Obtain PT/OT consults as needed  - Advance activity as appropriate  - Communicate ordered activity level and limitations with patient/family  Outcome: Progressing     Problem: Impaired Functional Mobility  Goal: Achieve highest/safest level of mobility/gait  Description: Interventions:  - Assess  patient's functional ability and stability  - Promote increasing activity/tolerance for mobility and gait  - Educate and engage patient/family in tolerated activity level and precautions  - Recommend use of  RW for transfers and ambulation  Outcome: Progressing     Problem: PAIN - ADULT  Goal: Verbalizes/displays adequate comfort level or patient's stated pain goal  Description: INTERVENTIONS:  - Encourage pt to monitor pain and request assistance  - Assess pain using appropriate pain scale  - Administer analgesics based on type and severity of pain and evaluate response  - Implement non-pharmacological measures as appropriate and evaluate response  - Consider cultural and social influences on pain and pain management  - Manage/alleviate anxiety  - Utilize distraction and/or relaxation techniques  - Monitor for opioid side effects  - Notify MD/LIP if interventions unsuccessful or patient reports new pain  - Anticipate increased pain with activity and pre-medicate as appropriate  Outcome: Progressing     Problem: SAFETY ADULT - FALL  Goal: Free from fall injury  Description: INTERVENTIONS:  - Assess pt frequently for physical needs  - Identify cognitive and physical deficits and behaviors that affect risk of falls.  - [place] fall precautions as indicated by assessment.  - Educate pt/family on patient safety including physical limitations  - Instruct pt to call for assistance with activity based on assessment  - Modify environment to reduce risk of injury  - Provide assistive devices as appropriate  - Consider OT/PT consult to assist with strengthening/mobility  - Encourage toileting schedule  Outcome: Progressing     Problem: DISCHARGE PLANNING  Goal: Discharge to home or other facility with appropriate resources  Description: INTERVENTIONS:  - Identify barriers to discharge w/pt and caregiver  - Include patient/family/discharge partner in discharge planning  - Arrange for needed discharge resources and  transportation as appropriate  - Identify discharge learning needs (meds, wound care, etc)  - Arrange for interpreters to assist at discharge as needed  - Consider post-discharge preferences of patient/family/discharge partner  - Complete POLST form as appropriate  - Assess patient's ability to be responsible for managing their own health  - Refer to Case Management Department for coordinating discharge planning if the patient needs post-hospital services based on physician/LIP order or complex needs related to functional status, cognitive ability or social support system  Outcome: Progressing

## 2024-03-10 NOTE — ED NOTES
Orders for admission, patient is aware of plan and ready to go upstairs. Any questions, please call ED MARITA phipps  at extension [phone].   Type of COVID test sent:none  COVID Suspicion level: Low    Titratable drug(s) infusing:none  Rate:    LOC at time of transport:a/ox4, ambulatory with walker and standby assist    Other pertinent information    CIWA score=0  NIH score=0

## 2024-03-10 NOTE — ED QUICK NOTES
Patient care endorsed to this RN at bedside. Patient aware of admission and plan of care. Patient needs medication for his HTN prior to transport to unit.

## 2024-03-10 NOTE — ED QUICK NOTES
Orders for admission, patient is aware of plan and ready to go upstairs. Any questions, please call ED RN Anne at extension [phone].     Patient Covid vaccination status: Fully vaccinated     COVID Test Ordered in ED: None    COVID Suspicion at Admission: N/A    Running Infusions:  None    Mental Status/LOC at time of transport: AxOx4    Other pertinent information: Pain with ambulation needs 1x person assist.   CIWA score: N/A   NIH score:  N/A

## 2024-03-11 PROCEDURE — 99233 SBSQ HOSP IP/OBS HIGH 50: CPT | Performed by: INTERNAL MEDICINE

## 2024-03-11 NOTE — PROGRESS NOTES
[facility]  part of [facility]     Hospitalist Progress Note     Maximo Diggs Jr. Patient Status:  Observation    1955 MRN [id number]   Location [facility] 5SW/SE Attending Aixa Orr MD   Hosp Day # 1 PCP Lito Pedroza MD     Subjective:     Patient laying comfortably in bed and in NAD. Denied any active complaints at the time of interview apart from low back pain.   No acute overnight events reported by the nursing staff.       Objective:    Review of Systems:   ROS completed; pertinent positive and negatives stated in subjective.      Vital signs:  Temp:  [98 °F (36.7 °C)-98.4 °F (36.9 °C)] 98.4 °F (36.9 °C)  Pulse:  [74-84] 78  Resp:  [16-18] 18  BP: (138-170)/(50-88) 150/52  SpO2:  [97 %-98 %] 98 %      Physical Exam:    Gen: NAD AO x3  Chest: good air entry CTABL  CVS: normal s1 and s2 RR  Abd: NABS soft NT ND  Neuro: CN 2-12 grossly intact  Ext: no edema in bilateral LE      Diagnostic Data:    Labs:  Recent Labs   Lab 03/10/24  0547   WBC 13.7*   HGB 10.6*   MCV 81.9   .0   BAND 1       Recent Labs   Lab 03/10/24  0547   *   BUN 27*   CREATSERUM 1.41*   CA 8.4*      K 4.3      CO2 22.0       Estimated Creatinine Clearance: 44.2 mL/min (A) (based on SCr of 1.41 mg/dL (H)).    No results for input(s): \"PTP\", \"INR\" in the last 168 hours.           Imaging: Imaging data reviewed in Epic.    Medications:    tamsulosin  0.4 mg Oral Daily @ 0700    clopidogrel  75 mg Oral Daily    pantoprazole  40 mg Oral QAM AC    hydrALAZINE  100 mg Oral Q8H FERNANDO    levothyroxine  25 mcg Oral Before breakfast    NIFEdipine ER  60 mg Oral BID    heparin  5,000 Units Subcutaneous Q8H FERNANDO    predniSONE  20 mg Oral BID    carvedilol  12.5 mg Oral BID with meals       Assessment & Plan:     Acute left sided low back pain with left sided sciatica  Likely 2/2 fall  Imaging reviewed  Pain control PRN  PMR on consult - recommend home vs KRISTI  Appreciate PT/OT  recs  HTN  BP elevated on admission  Continue home meds  Hydralazine PRN  Monitor vitals  Hypothyroidism  Continue home meds  CAD  Continue home meds      Plan of care discussed with patient or family at bedside.      Supplementary Documentation:     Quality:  DVT Prophylaxis: heparin s/c  CODE status: Full      Estimated date of discharge: TBD  Discharge is dependent on: clinical stability  At this point Mr. Diggs is expected to be discharge to: home                  Aixa Orr MD  Hospitalist    MDM: High, I personally reviewed the available laboratories, imaging including CT, XR. I discussed the case with RN. I ordered laboratories, studies including AM labs.  Medical decision making high, risk is high.       The 21st Century Cures Act makes medical notes like these available to patients in the interest of transparency. Please be advised this is a medical document. Medical documents are intended to carry relevant information, facts as evident, and the clinical opinion of the practitioner. The medical note is intended as peer to peer communication and may appear blunt or direct. It is written in medical language and may contain abbreviations or verbiage that are unfamiliar.

## 2024-03-11 NOTE — OCCUPATIONAL THERAPY NOTE
OCCUPATIONAL THERAPY EVALUATION - INPATIENT     Room Number: 536/536-A  Evaluation Date: 3/11/2024  Type of Evaluation: Initial  Presenting Problem: sciatica    Physician Order: IP Consult to Occupational Therapy  Reason for Therapy: ADL/IADL Dysfunction and Discharge Planning    OCCUPATIONAL THERAPY ASSESSMENT   Patient is a 68 year old male admitted 3/9/2024 for left sided low back pain and falls.  Prior to admission, patient's baseline is very active at baseline but has experienced increased pain and higher frequency of falls leading up to admission.  Patient is currently functioning below baseline with self care, transfers, and functional mobility.  Patient is requiring up to Min A as a result of the following impairments: pain, activity tolerance, weakness, balance, mobility. Occupational Therapy will continue to follow for duration of hospitalization.    Patient will benefit from continued skilled OT Services to promote return to prior level of function and safety with continuous assistance and gradual rehabilitative therapy     PLAN  OT Treatment Plan: Balance activities;Energy conservation/work simplification techniques;ADL training;Functional transfer training;Endurance training;Patient/Family training;Patient/Family education;Compensatory technique education  OT Device Recommendations: TBD    OCCUPATIONAL THERAPY MEDICAL/SOCIAL HISTORY   Problem List   Principal Problem:    Acute left-sided low back pain with left-sided sciatica  Active Problems:    Pain of left humerus    HOME SITUATION  Type of Home: House  Home Layout: Able to live on main level  Lives With: Spouse  Drives: No  Patient Regularly Uses: Glasses      SUBJECTIVE  I want to go to rehab     OCCUPATIONAL THERAPY EXAMINATION   OBJECTIVE  Fall Risk: Standard fall risk    WEIGHT BEARING RESTRICTION     PAIN ASSESSMENT  Ratin    ACTIVITY TOLERANCE  Pulse: 76        BP: 157/88             O2 SATURATIONS  Oxygen Therapy  SPO2% on Room Air at  Rest: 97    COGNITION  Overall Cognitive Status:  WFL - within functional limits    RANGE OF MOTION   Upper extremity ROM is within functional limits     STRENGTH ASSESSMENT  Upper extremity strength is within functional limits     ACTIVITIES OF DAILY LIVING ASSESSMENT  AM-PAC ‘6-Clicks’ Inpatient Daily Activity Short Form  How much help from another person does the patient currently need…  -   Putting on and taking off regular lower body clothing?: A Little  -   Bathing (including washing, rinsing, drying)?: A Little  -   Toileting, which includes using toilet, bedpan or urinal? : A Little  -   Putting on and taking off regular upper body clothing?: A Little  -   Taking care of personal grooming such as brushing teeth?: A Little  -   Eating meals?: A Little    AM-PAC Score:  Score: 18  Approx Degree of Impairment: 46.65%  Standardized Score (AM-PAC Scale): 38.66  CMS Modifier (G-Code): CK    FUNCTIONAL TRANSFER ASSESSMENT- Min A from chair; CGA from toilet     BED MOBILITY: SBA     BALANCE ASSESSMENT: CGA-Min A standing; SbA/SPV for seated     FUNCTIONAL ADL ASSESSMENT    Eating: Independent  Grooming: setup  Bathing: Min A  LE dressing: Min A  UE dressing: Setup  Toileting: CGA        Skilled Therapy Provided: Activity promotion, ADL retraining, energy conservation; left up in chair at end of session    EDUCATION PROVIDED  Patient: Role of Occupational Therapy; Plan of Care; Discharge Recommendations; Functional Transfer Techniques; Posture/Positioning; Compensatory ADL Techniques  Patient's Response to Education: Verbalized Understanding    The patient's Approx Degree of Impairment: 46.65% has been calculated based on documentation in the [facility] '6 clicks' Inpatient Daily Activity Short Form.  Research supports that patients with this level of impairment may benefit from HH, however, pt is below baseline and with high frequency of falls is at risk for further deconditioning; may benefit from GR to maximize  function prior to return home.    .  Final disposition will be made by interdisciplinary medical team.    Patient End of Session: Up in chair;Needs met;Call light within reach;RN aware of session/findings;All patient questions and concerns addressed    OT Goals  Patient self-stated goal is: to go to rehab     Patient will complete LE dressing with Kasia   Comment:     Patient will complete toilet transfer with Mod I   Comment:     Patient will complete self care task at sink level with Kasia    Comment:     Comment:         Goals  on:3/25  Frequency:3x week    Patient Evaluation Complexity Level:   Occupational Profile/Medical History low   Specific performance deficits impacting engagement in ADL/IADL low   Client Assessment/Performance Deficits low   Clinical Decision Making low   Overall Complexity Low      OT Session Time: 15 minutes  Self-Care Home Management: 15 minutes    Davi Moody, Occupational Therapist, OTR/L ext [phone]

## 2024-03-11 NOTE — PLAN OF CARE
Pt up ambulating from chair to bed x1 assist. Complaints of pain, PRN norco given. Safety precautions maintained and in place. Plan for PMR consult.    Problem: Patient Centered Care  Goal: Patient preferences are identified and integrated in the patient's plan of care  Description: Interventions:  - What would you like us to know as we care for you? From home w/ wife  - Provide timely, complete, and accurate information to patient/family  - Incorporate patient and family knowledge, values, beliefs, and cultural backgrounds into the planning and delivery of care  - Encourage patient/family to participate in care and decision-making at the level they choose  - Honor patient and family perspectives and choices  Outcome: Progressing     Problem: Patient/Family Goals  Goal: Patient/Family Long Term Goal  Description: Patient's Long Term Goal: Go home    Interventions:  - Diagnostic testing  - Follow MD orders  - See additional Care Plan goals for specific interventions  Outcome: Progressing  Goal: Patient/Family Short Term Goal  Description: Patient's Short Term Goal: Decreased pain    Interventions:   - Pain management  - PT/OT  - Follow MD orders  - See additional Care Plan goals for specific interventions  Outcome: Progressing     Problem: MUSCULOSKELETAL - ADULT  Goal: Return mobility to safest level of function  Description: INTERVENTIONS:  - Assess patient stability and activity tolerance for standing, transferring and ambulating w/ or w/o assistive devices  - Assist with transfers and ambulation using safe patient handling equipment as needed  - Ensure adequate protection for wounds/incisions during mobilization  - Obtain PT/OT consults as needed  - Advance activity as appropriate  - Communicate ordered activity level and limitations with patient/family  Outcome: Progressing     Problem: Impaired Functional Mobility  Goal: Achieve highest/safest level of mobility/gait  Description: Interventions:  - Assess  patient's functional ability and stability  - Promote increasing activity/tolerance for mobility and gait  - Educate and engage patient/family in tolerated activity level and precautions  - Recommend use of  RW for transfers and ambulation  Outcome: Progressing     Problem: PAIN - ADULT  Goal: Verbalizes/displays adequate comfort level or patient's stated pain goal  Description: INTERVENTIONS:  - Encourage pt to monitor pain and request assistance  - Assess pain using appropriate pain scale  - Administer analgesics based on type and severity of pain and evaluate response  - Implement non-pharmacological measures as appropriate and evaluate response  - Consider cultural and social influences on pain and pain management  - Manage/alleviate anxiety  - Utilize distraction and/or relaxation techniques  - Monitor for opioid side effects  - Notify MD/LIP if interventions unsuccessful or patient reports new pain  - Anticipate increased pain with activity and pre-medicate as appropriate  Outcome: Progressing     Problem: SAFETY ADULT - FALL  Goal: Free from fall injury  Description: INTERVENTIONS:  - Assess pt frequently for physical needs  - Identify cognitive and physical deficits and behaviors that affect risk of falls.  - [place] fall precautions as indicated by assessment.  - Educate pt/family on patient safety including physical limitations  - Instruct pt to call for assistance with activity based on assessment  - Modify environment to reduce risk of injury  - Provide assistive devices as appropriate  - Consider OT/PT consult to assist with strengthening/mobility  - Encourage toileting schedule  Outcome: Progressing     Problem: DISCHARGE PLANNING  Goal: Discharge to home or other facility with appropriate resources  Description: INTERVENTIONS:  - Identify barriers to discharge w/pt and caregiver  - Include patient/family/discharge partner in discharge planning  - Arrange for needed discharge resources and  transportation as appropriate  - Identify discharge learning needs (meds, wound care, etc)  - Arrange for interpreters to assist at discharge as needed  - Consider post-discharge preferences of patient/family/discharge partner  - Complete POLST form as appropriate  - Assess patient's ability to be responsible for managing their own health  - Refer to Case Management Department for coordinating discharge planning if the patient needs post-hospital services based on physician/LIP order or complex needs related to functional status, cognitive ability or social support system  Outcome: Progressing

## 2024-03-11 NOTE — PLAN OF CARE
Assumed care of patient at 1500, no acute changes. Call light within reach. Pain well controlled at this time per patient  Problem: Patient Centered Care  Goal: Patient preferences are identified and integrated in the patient's plan of care  Description: Interventions:  - What would you like us to know as we care for you? From home w/ wife  - Provide timely, complete, and accurate information to patient/family  - Incorporate patient and family knowledge, values, beliefs, and cultural backgrounds into the planning and delivery of care  - Encourage patient/family to participate in care and decision-making at the level they choose  - Honor patient and family perspectives and choices  Outcome: Progressing     Problem: Patient/Family Goals  Goal: Patient/Family Long Term Goal  Description: Patient's Long Term Goal: Go home    Interventions:  - Diagnostic testing  - Follow MD orders  - See additional Care Plan goals for specific interventions  Outcome: Progressing  Goal: Patient/Family Short Term Goal  Description: Patient's Short Term Goal: Decreased pain    Interventions:   - Pain management  - PT/OT  - Follow MD orders  - See additional Care Plan goals for specific interventions  Outcome: Progressing     Problem: MUSCULOSKELETAL - ADULT  Goal: Return mobility to safest level of function  Description: INTERVENTIONS:  - Assess patient stability and activity tolerance for standing, transferring and ambulating w/ or w/o assistive devices  - Assist with transfers and ambulation using safe patient handling equipment as needed  - Ensure adequate protection for wounds/incisions during mobilization  - Obtain PT/OT consults as needed  - Advance activity as appropriate  - Communicate ordered activity level and limitations with patient/family  Outcome: Progressing     Problem: Impaired Functional Mobility  Goal: Achieve highest/safest level of mobility/gait  Description: Interventions:  - Assess patient's functional ability and  stability  - Promote increasing activity/tolerance for mobility and gait  - Educate and engage patient/family in tolerated activity level and precautions    Outcome: Progressing     Problem: PAIN - ADULT  Goal: Verbalizes/displays adequate comfort level or patient's stated pain goal  Description: INTERVENTIONS:  - Encourage pt to monitor pain and request assistance  - Assess pain using appropriate pain scale  - Administer analgesics based on type and severity of pain and evaluate response  - Implement non-pharmacological measures as appropriate and evaluate response  - Consider cultural and social influences on pain and pain management  - Manage/alleviate anxiety  - Utilize distraction and/or relaxation techniques  - Monitor for opioid side effects  - Notify MD/LIP if interventions unsuccessful or patient reports new pain  - Anticipate increased pain with activity and pre-medicate as appropriate  Outcome: Progressing     Problem: SAFETY ADULT - FALL  Goal: Free from fall injury  Description: INTERVENTIONS:  - Assess pt frequently for physical needs  - Identify cognitive and physical deficits and behaviors that affect risk of falls.  - [place] fall precautions as indicated by assessment.  - Educate pt/family on patient safety including physical limitations  - Instruct pt to call for assistance with activity based on assessment  - Modify environment to reduce risk of injury  - Provide assistive devices as appropriate  - Consider OT/PT consult to assist with strengthening/mobility  - Encourage toileting schedule  Outcome: Progressing     Problem: DISCHARGE PLANNING  Goal: Discharge to home or other facility with appropriate resources  Description: INTERVENTIONS:  - Identify barriers to discharge w/pt and caregiver  - Include patient/family/discharge partner in discharge planning  - Arrange for needed discharge resources and transportation as appropriate  - Identify discharge learning needs (meds, wound care, etc)  -  Arrange for interpreters to assist at discharge as needed  - Consider post-discharge preferences of patient/family/discharge partner  - Complete POLST form as appropriate  - Assess patient's ability to be responsible for managing their own health  - Refer to Case Management Department for coordinating discharge planning if the patient needs post-hospital services based on physician/LIP order or complex needs related to functional status, cognitive ability or social support system  Outcome: Progressing

## 2024-03-11 NOTE — CONSULTS
[facility]  part of [facility]      Maximo Diggs JrCoreen Patient Status:  Inpatient    1955 MRN [id number]   Location [facility] 5SW/SE Attending Aixa Orr MD   Hosp Day # 1 PCP Lito Pedroza MD       CC: Self-care mobility deficits secondary recurrent falls, low back pain    History of Present Illness:    68-year-old gentleman with history of temporal arteritis, work-related back injury with multiple back surgeries presents to the hospital after recurrent falls.  He states he is generally does well however has had some left lower extremity weakness with sensation of a \"giving out\".  Present to the hospital with acute onset back pain.  Pain is starting to improve.  He has been able to better participate with therapies and we are asked to see the best way to approach his rehabilitation would be.  Of note patient is unable to have an MRI due to having a stimulator placed in the back which he states is for his temporal arteritis.  Patient is limited historian.      Medications Prior to Admission   Medication Sig Dispense Refill Last Dose    hydrALAZINE 100 MG Oral Tab Take 1 tablet (100 mg total) by mouth every 8 (eight) hours. 90 tablet 1 3/9/2024    HYDROcodone-acetaminophen 5-325 MG Oral Tab Take 1 tablet by mouth every 6 (six) hours as needed for Pain. 20 tablet 0 3/9/2024    alfuzosin ER 10 MG Oral Tablet 24 Hr Take 1 tablet (10 mg total) by mouth daily.   3/9/2024    predniSONE 10 MG Oral Tab Take 2 tablets (20 mg total) by mouth 2 (two) times daily.   3/9/2024    Esomeprazole Magnesium 20 MG Oral Capsule Delayed Release Take 1 capsule (20 mg total) by mouth every morning before breakfast.   3/9/2024    alendronate 70 MG Oral Tab Take 1 tablet (70 mg total) by mouth once a week. On SUNDAYS only   Past Week    cyclobenzaprine 10 MG Oral Tab Take 1 tablet (10 mg total) by mouth 3 (three) times daily as needed for Muscle spasms.   3/9/2024    NIFEdipine 60 MG Oral Tablet  24 Hr Take 1 tablet (60 mg total) by mouth in the morning and 1 tablet (60 mg total) before bedtime.   3/9/2024    Vitamin D3 1000 units Oral Tab Take 1 tablet (1,000 Units total) by mouth daily.   3/9/2024    Coenzyme Q10 (COQ10) 100 MG Oral Cap Take 100 mg by mouth in the morning and 100 mg before bedtime.   3/9/2024    Bisoprolol Fumarate (ZEBETA) 5 MG Oral Tab Take 1 tablet (5 mg total) by mouth in the morning and 1 tablet (5 mg total) before bedtime.   3/9/2024    Levothyroxine Sodium (SYNTHROID, LEVOTHROID) 25 MCG Oral Tab Take 1 tablet (25 mcg total) by mouth before breakfast.   3/9/2024    clopidogrel 75 MG Oral Tab Take 1 tablet (75 mg total) by mouth daily.   3/9/2024    [] lidocaine 5 % External Patch Place 1 patch onto the skin daily for 10 days. 10 patch 0     ondansetron 4 MG Oral Tablet Dispersible Take 1 tablet (4 mg total) by mouth every 8 (eight) hours as needed for Nausea. 6 tablet 0 More than a month    pregabalin 75 MG Oral Cap Take 1 capsule (75 mg total) by mouth daily. 15 capsule 1      Allergies   Allergen Reactions    Aspirin NAUSEA AND VOMITING    Clavulanic Acid HIVES    Losartan MYALGIA    Penicillins HIVES    Seafood ANAPHYLAXIS    Iodine (Topical) UNKNOWN    Pregabalin OTHER (SEE COMMENTS)    Seasonal      Past Medical History:   Diagnosis Date    PIERCE (acute kidney injury) (HCC) 2021    Arthritis     CAD (coronary artery disease)     High cholesterol     Neuropathy     Retinal pigmentation     Spine fracture     L3, L4, L5.  Rods placed    Thyroid disease     Ulcer     Unspecified essential hypertension      Past Surgical History:   Procedure Laterality Date    ANGIOPLASTY (CORONARY)      with stent placement x 1    BACK SURGERY      EXCIS JENNIFER LINING,PROX I-P JT,EACH Right 10/14/2015    Procedure: ARTHROTOMY OF FINGER JOINT INTERPHALANGEAL;  Surgeon: Olvin Vences MD;  Location: [facility]    EXCIS JENNIFER LINING,PROX I-P JT,EACH Right 10/14/2015    Procedure:  ARTHROTOMY OF FINGER JOINT INTERPHALANGEAL;  Surgeon: Olvin Vences MD;  Location: [facility]    OTHER SURGICAL HISTORY  2000,2002, 2004, 2006    back surgery     OTHER SURGICAL HISTORY  1982    \"ulcer surgery\"    OTHER SURGICAL HISTORY Right 10/14/15    right arthrotomy, debridement, synovectomy of the joints w/biopsy right index and long finger MP joint    PATIENT DOCUMENTED NOT TO HAVE EXPERIENCED ANY OF THE FOLLOWING EVENTS Right 10/14/2015    Procedure: ARTHROTOMY OF FINGER JOINT INTERPHALANGEAL;  Surgeon: Olvin Vences MD;  Location: [facility]    PATIENT WITH PREOPERATIVE ORDER FOR IV ANTIBIOTIC SURGICAL SITE INFECT Right 10/14/2015    Procedure: ARTHROTOMY OF FINGER JOINT INTERPHALANGEAL;  Surgeon: Olvin Vences MD;  Location: [facility]     Social History     Socioeconomic History    Marital status:      Spouse name: Not on file    Number of children: Not on file    Years of education: Not on file    Highest education level: Not on file   Occupational History    Not on file   Tobacco Use    Smoking status: Never    Smokeless tobacco: Never   Vaping Use    Vaping Use: Never used   Substance and Sexual Activity    Alcohol use: No    Drug use: No    Sexual activity: Not on file   Other Topics Concern    Not on file   Social History Narrative    Not on file     Social Determinants of Health     Financial Resource Strain: Not on file   Food Insecurity: No Food Insecurity (3/9/2024)    Food Insecurity     Food Insecurity: Never true   Transportation Needs: No Transportation Needs (3/9/2024)    Transportation Needs     Lack of Transportation: No   Physical Activity: Not on file   Stress: Not on file   Social Connections: Not on file   Housing Stability: Low Risk  (3/9/2024)    Housing Stability     Housing Instability: No     Housing Instability Emergency: Not on file     Family History   Problem Relation Age of Onset    Hypertension Father     Cancer Father         prostate cancer     Hypertension Mother     Diabetes Sister        PAIN SCALE: 2    ACP: full code    Review of Systems  Pertinent items are noted in HPI.    Physical Exam  Temp:  [98 °F (36.7 °C)-98.3 °F (36.8 °C)] 98 °F (36.7 °C)  Pulse:  [74-84] 74  Resp:  [16-18] 16  BP: (138-170)/(50-61) 170/61  SpO2:  [97 %-98 %] 97 %  97%    I/O last 3 completed shifts:  In: 920 [P.O.:920]  Out: 1525 [Urine:1525]  No intake/output data recorded.       CVS-S1-S2 RRR no edema by extremities  Extremities-warm to touch, peripheral pulses are palpable  Lymph nodes-no palpable lymph nodes in neck or groin  MSK-bilateral upper extremity strength 5 out of 5 throughout.  Left lower extremity 4+ out of 5 when compared to the right which is 5 out of 5  Neurological exam cranial 2-12 are to be intact sensation intact to light touch and joint proprioception bilateral lower extremities    Previous Function: Mod I with a rolling walker, lives with his wife in a two-story home.  Drives    CURRENT FUNCTION:  AM-PAC Score:  Raw Score: 17   Approx Degree of Impairment: 50.57%   Standardized Score (AM-PAC Scale): 42.13   CMS Modifier (G-Code): CK     FUNCTIONAL ABILITY STATUS  Functional Mobility/Gait Assessment  Gait Assistance: Minimum assistance  Distance (ft): 3 feet x 1, 30 feet x 1  Assistive Device: Rolling walker  Pattern:  (decreased magdaleno, decreased step length, forward flexed posture, narrow base of support, verbal cues for sequencing and rolling walker management.)  Rolling: contact guard assist  Supine to Sit: contact guard assist  Sit to Supine:  not tested  Sit to Stand: minimal assist    Labs  Lab Results   Component Value Date    WBC 13.7 (H) 03/10/2024    HGB 10.6 (L) 03/10/2024    HCT 32.1 (L) 03/10/2024    MCV 81.9 03/10/2024    .0 03/10/2024     No results found for: \"PTT\"  No results found for: \"PROTIME\"  Lab Results   Component Value Date    GLUCOSE 102 (H) 09/23/2014     03/10/2024    K 4.3 03/10/2024    CO2 22.0 03/10/2024      03/10/2024    BUN 27 (H) 03/10/2024       Radiology:  CT SPINE LUMBAR (CPT=72131)    Result Date: 3/9/2024  PROCEDURE: CT SPINE LUMBAR (CPT=72131)  COMPARISON: [facility], XR LUMBAR SPINE (MIN 2 VIEWS) (CPT=72100), 3/09/2024, 3:49 PM.  [facility], XR LUMBAR SPINE (MIN 2 VIEWS) (CPT=72100), 2/25/2024, 7:34 AM.  [facility], CT ABDOMEN PELVIS IV CONTRAST NO ORAL (ER), 11/15/2021, 3:20 PM.  INDICATIONS: Lower back/tailbone pain post fall a few days ago.  TECHNIQUE: Multidetector CT images of the lumbar spine were obtained without the infusion of non-ionic intravenous contrast material. Automated exposure control for dose reduction was used. Adjustment of the mA and/or kV was done based on the patient's size. Iterative reconstruction technique for dose reduction was employed. Dose information was transmitted to the ACR (American College of Radiology) NRDR (National Radiology Data Registry), which includes the Dose Index Registry.   FINDINGS: ALIGNMENT: The lumbar lordosis is preserved.  The lumbar spine is well aligned. BONES: The osseous structures are demineralized.  No fractures or osseous lesions are apparent.  Postoperative changes are again noted from a remote laminectomy and posterior fusion at L3-S1 including transpedicular screws and vertical interconnecting rods bilaterally at L3-L4.  The hardware is intact and in stable customary positioning.  Mature bone chip formation is again noted at L2-L3 through L5-S1.  There is stable mild sacroiliac joint osteoarthritis bilaterally. DISC LEVELS: Intervertebral disc spacers are again seen at L3-L4, L4-L5 and L5-S1, which are in stable customary positioning.  There is stable mild disc space narrowing at L1-L2 with endplate spurring and vacuum phenomenon.  There is a mild generalized circumferential disc bulge at L1-L2 with moderate bilateral facet joint hypertrophy and ligamentum flavum laxity.  Mild central  vertebral canal stenosis and bilateral neural foraminal stenosis is seen at this level.  There is bilateral neural foraminal narrowing at L3-L4 and L4-L5. PARASPINAL AREA: No visible mass.  OTHER: There is no visible swelling of the prevertebral soft tissues.  Atherosclerotic calcifications are again noted throughout the abdominal aorta and bilateral common iliac arteries.  A stent is again seen at the origin of the celiac artery.  Surgical  clips are again noted about the gastroesophageal junction.         CONCLUSION:  1. No fracture or dislocation. 2. Stable postoperative changes from a remote laminectomy, interbody fusion and posterior fusion at L3-S1 including posterior instrumented fusion at L3-L4.  No evidence of hardware failure. 3. Bilateral neural foraminal narrowing at L3-L4 and L4-L5. 4. Residual spondylotic changes at L1-L2 result in mild central vertebral canal stenosis and bilateral neural foraminal stenosis. 5. Stable mild spondylotic changes in the upper lumbar spine.     Dictated by (CST): Krzysztof Myles MD on 3/09/2024 at 6:16 PM     Finalized by (CST): Krzysztof Myles MD on 3/09/2024 at 6:22 PM          XR HUMERUS (MIN 2 VIEWS), LEFT (CPT=73060)    Result Date: 3/9/2024  PROCEDURE: XR HUMERUS (MIN 2 VIEWS), LEFT (CPT=73060)  COMPARISON: None.  INDICATIONS: Left arm paresthesias.  TECHNIQUE: 2 views were obtained.   FINDINGS:  BONES: There is no fracture or dislocation.  The glenohumeral joint is intact.  There is mild left acromioclavicular joint osteoarthritis.  There is mild spurring involving the ulnotrochlear articulation.  The bones and joint spaces are otherwise intact. SOFT TISSUES: Mild atherosclerotic calcifications are seen in the left axilla. No visible soft tissue swelling. EFFUSION: None visible. OTHER: Negative.         CONCLUSION:  1. No fracture dislocation. 2. Mild left acromioclavicular and ulnotrochlear joint osteoarthritis.    Dictated by (CST): Krzysztof Myles MD on  3/09/2024 at 6:10 PM     Finalized by (CST): Krzysztof Myles MD on 3/09/2024 at 6:11 PM          XR LUMBAR SPINE (MIN 2 VIEWS) (CPT=72100)    Result Date: 3/9/2024  PROCEDURE: XR LUMBAR SPINE (MIN 2 VIEWS) (CPT=72100)  COMPARISON: [facility], XR LUMBAR SPINE (MIN 2 VIEWS) (CPT=72100), 2/25/2024, 7:34 AM.  INDICATIONS: Low back pain post fall few day ago, tailbone pain.  TECHNIQUE: Lumbar spine radiographs (2-3 views)   FINDINGS:   ALIGNMENT: Normal alignment.  VERTEBRAL BODIES:   There are 5 lumbar type vertebrae.  Vertebral body heights are maintained.  Postoperative changes are again noted from a remote laminectomy and posterior fusion at L3-S1 including transpedicular screws and vertical interconnecting rods bilaterally at L3-L4.  The hardware is intact and in stable customary positioning.  Mature bone chip formation is again seen at L4-S1. DISC SPACES: Intervertebral disc spacers at L3-L4, L4-L5 and L5-S1 are in stable customary positioning.  There is stable mild disc space narrowing at L1-L2 with endplate sclerosis/spurring. SACROILIAC JOINTS: Stable mild osteoarthritis bilaterally. OTHER: Atherosclerotic calcifications are again seen throughout the abdominal aorta.  A metallic vascular stent is again noted in the epigastric region.  Pelvic phleboliths are again noted bilaterally.  There is a partially imaged spinal cord stimulator device.         CONCLUSION:  1. No fracture or dislocation. 2. Stable postoperative changes from a remote laminectomy and posterior fusion at L3-S1.  No evidence of hardware failure. 3. Stable mild spondylotic changes in the upper lumbar spine.    Dictated by (CST): Krzysztof Myles MD on 3/09/2024 at 4:13 PM     Finalized by (CST): Krzysztof Myles MD on 3/09/2024 at 4:16 PM          CT BRAIN OR HEAD (72449)    Result Date: 2/25/2024  PROCEDURE: CT BRAIN OR HEAD (CPT=70450)  COMPARISON: [facility], CT BRAIN OR HEAD (CPT=70450), 9/23/2021, 12:21  PM.  INDICATIONS: LLE weakness, fall.  TECHNIQUE: CT images were obtained without contrast material.  Automated exposure control for dose reduction was used.  Dose information is transmitted to the ACR (American College of Radiology) NRDR (National Radiology Data Registry) which includes the Dose Index Registry.  FINDINGS:  No skull fracture or scalp hematoma  Normal midline ventricles  No hemorrhage or mass effect or edema         CONCLUSION: No acute finding    Dictated by (CST): Ken Chapa MD on 2/25/2024 at 10:21 AM     Finalized by (CST): Ken Chapa MD on 2/25/2024 at 10:22 AM          XR LUMBAR SPINE (MIN 2 VIEWS) (CPT=72100)    Result Date: 2/25/2024  PROCEDURE: XR LUMBAR SPINE (MIN 2 VIEWS) (CPT=72100)  COMPARISON: [facility], CT ABDOMEN PELVIS IV CONTRAST NO ORAL (ER), 11/15/2021, 3:20 PM.  INDICATIONS: Patient states back pain and lateral left hip pain post accidental fall x 1 day ago.  TECHNIQUE: Lumbar spine radiographs (2-3 views)   FINDINGS:   ALIGNMENT: Normal alignment.  VERTEBRAL BODIES:   Posterior postsurgical instrumented fusion L3-L4 noted.  Mild chronic appearing T12 and L1 compression deformities.  Minimal compression deformity L2 also likely chronic.  Otherwise no acute fracture or malalignment.  Multilevel marginal osteophyte formation and facet hypertrophy. DISC SPACES: Disc spaces are seen at L4-L5 and L5-S1. SACROILIAC JOINTS: Mild bilateral degenerative change. OTHER: Partially imaged probable spinal stimulator.  Atherosclerotic vascular calcification including aortic calcification.         CONCLUSION:   No acute fracture or malalignment.  Mild chronic appearing compression deformities T12, L1 and L2.  Postsurgical change lumbar spine as above with moderate lumbar spine degenerative change.    Dictated by (CST): Lokesh Boland MD on 2/25/2024 at 8:26 AM     Finalized by (CST): Lokesh Boland MD on 2/25/2024 at 8:30 AM          XR HIP W OR WO PELVIS 2 OR 3  VIEWS, LEFT (CPT=73502)    Result Date: 2/25/2024  PROCEDURE: XR HIP W OR WO PELVIS 2 OR 3 VIEWS, LEFT (CPT=73502)  COMPARISON: None.  INDICATIONS: Patient states back pain and lateral left hip pain post accidental fall x 1 day ago.  TECHNIQUE: 3 views were obtained.   FINDINGS:  BONES: No acute fracture or dislocation.  Mild degenerative change left hip.  Mild degenerative change elsewhere in the pelvis.  Evidence of postsurgical change in the lower lumbar spine and upper sacrum including posterior instrumented fusion. SOFT TISSUES: Negative. No visible soft tissue swelling. EFFUSION: None visible. OTHER: Vascular calcification.         CONCLUSION:   No acute fracture or dislocation.  Mild left hip osteoarthritis.    Dictated by (CST): Lokesh Boland MD on 2/25/2024 at 8:25 AM     Finalized by (CST): Lokesh Boland MD on 2/25/2024 at 8:26 AM            Assessment    Patient Active Problem List   Diagnosis    ESR raised    Cramps, extremity    Enthesopathy of wrist and carpus    Calcium pyrophosphate arthropathy    Stiffness of finger joint, right    [facility] BIOP[facility]YSTALS RIGHT INDEX AND LONG FINGERS MP / DOS 10-14-15 / EXP 1-12-16    Chondrocostal junction syndrome (tietze)    Anterior chest wall pain    Trigger finger, right middle finger    Complex regional pain syndrome type 1 of left upper extremity    Left arm pain    PIERCE (acute kidney injury) ([facility])    Acute nonintractable headache, unspecified headache type    Strain of lumbar region, initial encounter    Weakness of both lower extremities    Contusion of left hip, initial encounter    GBS (Guillain [place] syndrome) ([facility])    Resistant hypertension    Acute left-sided low back pain with left-sided sciatica    Pain of left humerus       Plan     Continue PT OT   BP being monitored as per primary service  Patient is on baseline steroids 20 mg  Function patient is doing well should be able to be discharged  home, if unable to do so then recommend subacute rehab.  Patient's preference is to go to [facility]    Thank you this consultation allowing participate in this patient's care        LAURIE STEWART MD    3/11/2024    8:34 AM

## 2024-03-11 NOTE — CM/SW NOTE
Anticipated therapy need: Gradual Rehabilitative Therapy. PM&R resulted in [facility] recommendation. Pt requesting [facility] for [facility] at dc per physiatry note dated today. CLRC review pending. [facility] referrals sent in AIDIN. List of accepting facilities will be needed if [facility] is unable to accept.     PASRR level 1 screen completed and uploaded to aidin referral.     Plan: [facility] for [facility] pending CLRC review, facility acceptance, and medical clearance.    SALTY Candelario    [phone]

## 2024-03-12 LAB
ALBUMIN SERPL-MCNC: 3.4 G/DL (ref 3.2–4.8)
ALBUMIN/GLOB SERPL: 0.9 {RATIO} (ref 1–2)
ALP LIVER SERPL-CCNC: 32 U/L
ALT SERPL-CCNC: 51 U/L
ANION GAP SERPL CALC-SCNC: 0 MMOL/L (ref 0–18)
AST SERPL-CCNC: 53 U/L (ref ?–34)
BASOPHILS # BLD: 0 X10(3) UL (ref 0–0.2)
BASOPHILS NFR BLD: 0 %
BILIRUB SERPL-MCNC: 0.3 MG/DL (ref 0.2–1.1)
BUN BLD-MCNC: 34 MG/DL (ref 9–23)
BUN/CREAT SERPL: 21.5 (ref 10–20)
CALCIUM BLD-MCNC: 8.8 MG/DL (ref 8.7–10.4)
CHLORIDE SERPL-SCNC: 110 MMOL/L (ref 98–112)
CO2 SERPL-SCNC: 22 MMOL/L (ref 21–32)
CREAT BLD-MCNC: 1.58 MG/DL
DEPRECATED RDW RBC AUTO: 56 FL (ref 35.1–46.3)
EGFRCR SERPLBLD CKD-EPI 2021: 48 ML/MIN/1.73M2 (ref 60–?)
EOSINOPHIL # BLD: 0 X10(3) UL (ref 0–0.7)
EOSINOPHIL NFR BLD: 0 %
ERYTHROCYTE [DISTWIDTH] IN BLOOD BY AUTOMATED COUNT: 19.9 % (ref 11–15)
GLOBULIN PLAS-MCNC: 4 G/DL (ref 2.8–4.4)
GLUCOSE BLD-MCNC: 149 MG/DL (ref 70–99)
HCT VFR BLD AUTO: 32.4 %
HGB BLD-MCNC: 10.8 G/DL
LYMPHOCYTES NFR BLD: 1.75 X10(3) UL (ref 1–4)
LYMPHOCYTES NFR BLD: 14 %
MCH RBC QN AUTO: 27.4 PG (ref 26–34)
MCHC RBC AUTO-ENTMCNC: 33.3 G/DL (ref 31–37)
MCV RBC AUTO: 82.2 FL
MONOCYTES # BLD: 0.13 X10(3) UL (ref 0.1–1)
MONOCYTES NFR BLD: 1 %
NEUTROPHILS # BLD AUTO: 9.21 X10 (3) UL (ref 1.5–7.7)
NEUTROPHILS NFR BLD: 85 %
NEUTS HYPERSEG # BLD: 10.63 X10(3) UL (ref 1.5–7.7)
NRBC BLD MANUAL-RTO: 1 %
OSMOLALITY SERPL CALC.SUM OF ELEC: 284 MOSM/KG (ref 275–295)
PLATELET # BLD AUTO: 180 10(3)UL (ref 150–450)
PLATELET MORPHOLOGY: NORMAL
POTASSIUM SERPL-SCNC: 4.9 MMOL/L (ref 3.5–5.1)
PROT SERPL-MCNC: 7.4 G/DL (ref 5.7–8.2)
RBC # BLD AUTO: 3.94 X10(6)UL
SODIUM SERPL-SCNC: 132 MMOL/L (ref 136–145)
TOTAL CELLS COUNTED BLD: 100
WBC # BLD AUTO: 12.5 X10(3) UL (ref 4–11)

## 2024-03-12 PROCEDURE — 99233 SBSQ HOSP IP/OBS HIGH 50: CPT | Performed by: INTERNAL MEDICINE

## 2024-03-12 NOTE — PLAN OF CARE
No acute changes overnight. Elevated BP, scheduled and PRN BP meds given. Pain managed with norco. Primofit in place. Safety precautions in place.     Problem: Patient Centered Care  Goal: Patient preferences are identified and integrated in the patient's plan of care  Description: Interventions:  - What would you like us to know as we care for you? From home w/ wife  - Provide timely, complete, and accurate information to patient/family  - Incorporate patient and family knowledge, values, beliefs, and cultural backgrounds into the planning and delivery of care  - Encourage patient/family to participate in care and decision-making at the level they choose  - Honor patient and family perspectives and choices  Outcome: Progressing     Problem: Patient/Family Goals  Goal: Patient/Family Long Term Goal  Description: Patient's Long Term Goal: Go home    Interventions:  - Diagnostic testing  - Follow MD orders  - See additional Care Plan goals for specific interventions  Outcome: Progressing  Goal: Patient/Family Short Term Goal  Description: Patient's Short Term Goal: Decreased pain    Interventions:   - Pain management  - PT/OT  - Follow MD orders  - See additional Care Plan goals for specific interventions  Outcome: Progressing     Problem: MUSCULOSKELETAL - ADULT  Goal: Return mobility to safest level of function  Description: INTERVENTIONS:  - Assess patient stability and activity tolerance for standing, transferring and ambulating w/ or w/o assistive devices  - Assist with transfers and ambulation using safe patient handling equipment as needed  - Ensure adequate protection for wounds/incisions during mobilization  - Obtain PT/OT consults as needed  - Advance activity as appropriate  - Communicate ordered activity level and limitations with patient/family  Outcome: Progressing     Problem: Impaired Functional Mobility  Goal: Achieve highest/safest level of mobility/gait  Description: Interventions:  - Assess  patient's functional ability and stability  - Promote increasing activity/tolerance for mobility and gait  - Educate and engage patient/family in tolerated activity level and precautions  - Recommend use of  RW for transfers and ambulation  Outcome: Progressing     Problem: PAIN - ADULT  Goal: Verbalizes/displays adequate comfort level or patient's stated pain goal  Description: INTERVENTIONS:  - Encourage pt to monitor pain and request assistance  - Assess pain using appropriate pain scale  - Administer analgesics based on type and severity of pain and evaluate response  - Implement non-pharmacological measures as appropriate and evaluate response  - Consider cultural and social influences on pain and pain management  - Manage/alleviate anxiety  - Utilize distraction and/or relaxation techniques  - Monitor for opioid side effects  - Notify MD/LIP if interventions unsuccessful or patient reports new pain  - Anticipate increased pain with activity and pre-medicate as appropriate  Outcome: Progressing     Problem: SAFETY ADULT - FALL  Goal: Free from fall injury  Description: INTERVENTIONS:  - Assess pt frequently for physical needs  - Identify cognitive and physical deficits and behaviors that affect risk of falls.  - [place] fall precautions as indicated by assessment.  - Educate pt/family on patient safety including physical limitations  - Instruct pt to call for assistance with activity based on assessment  - Modify environment to reduce risk of injury  - Provide assistive devices as appropriate  - Consider OT/PT consult to assist with strengthening/mobility  - Encourage toileting schedule  Outcome: Progressing     Problem: DISCHARGE PLANNING  Goal: Discharge to home or other facility with appropriate resources  Description: INTERVENTIONS:  - Identify barriers to discharge w/pt and caregiver  - Include patient/family/discharge partner in discharge planning  - Arrange for needed discharge resources and  transportation as appropriate  - Identify discharge learning needs (meds, wound care, etc)  - Arrange for interpreters to assist at discharge as needed  - Consider post-discharge preferences of patient/family/discharge partner  - Complete POLST form as appropriate  - Assess patient's ability to be responsible for managing their own health  - Refer to Case Management Department for coordinating discharge planning if the patient needs post-hospital services based on physician/LIP order or complex needs related to functional status, cognitive ability or social support system  Outcome: Progressing

## 2024-03-12 NOTE — CM/SW NOTE
List of accepting [facility] facilities provided to patient at bedside. Patient agreeable to review list with spouse when she arrives today and provide choice by tomorrow morning.     1425: Pt and spouse requesting [facility] for [facility] at [facility], facility reserved in AIDIN.    Plan: [facility] for [facility] pending medical clearance.    Aj Harrell, CLEMENTINEN    [phone]

## 2024-03-12 NOTE — CONGREGATE LIVING REVIEW
[facility] Living Authorization    The [facility] Review Committee has reviewed this case and the patient IS APPROVED for discharge to a facility for Short Term Skilled once the following procedure is followed:     - The physician discharge instructions (contained within the JOHANA note for SNF) must inlcude the below appropriate and approved COVID instructions to the facility    For questions regarding CLRC approval process, please contact the CM assigned to the case.  For questions regarding RN discharge workflow, please contact the unit Clinical Leader.

## 2024-03-12 NOTE — PROGRESS NOTES
[facility]  part of [facility]     Hospitalist Progress Note     Maximo Diggs Jr. Patient Status:  Observation    1955 MRN [id number]   Location [facility] 5SW/SE Attending Aixa Orr MD   Hosp Day # 2 PCP Lito Pedroza MD     Subjective:     Patient laying comfortably in bed and in NAD. Denied any active complaints at the time of interview apart from low back pain.   No acute overnight events reported by the nursing staff.   Awaiting placement.      Objective:    Review of Systems:   ROS completed; pertinent positive and negatives stated in subjective.      Vital signs:  Temp:  [98 °F (36.7 °C)-98.6 °F (37 °C)] 98.4 °F (36.9 °C)  Pulse:  [72-85] 82  Resp:  [18-20] 18  BP: (130-177)/(49-64) 176/64  SpO2:  [96 %-98 %] 98 %      Physical Exam:    Gen: NAD AO x3  Chest: good air entry CTABL  CVS: normal s1 and s2 RR  Abd: NABS soft NT ND  Neuro: CN 2-12 grossly intact  Ext: no edema in bilateral LE      Diagnostic Data:    Labs:  Recent Labs   Lab 03/10/24  0547   WBC 13.7*   HGB 10.6*   MCV 81.9   .0   BAND 1       Recent Labs   Lab 03/10/24  0547   *   BUN 27*   CREATSERUM 1.41*   CA 8.4*      K 4.3      CO2 22.0       Estimated Creatinine Clearance: 44.2 mL/min (A) (based on SCr of 1.41 mg/dL (H)).    No results for input(s): \"PTP\", \"INR\" in the last 168 hours.           Imaging: Imaging data reviewed in Epic.    Medications:    tamsulosin  0.4 mg Oral Daily @ 0700    clopidogrel  75 mg Oral Daily    pantoprazole  40 mg Oral QAM AC    hydrALAZINE  100 mg Oral Q8H FERNANDO    levothyroxine  25 mcg Oral Before breakfast    NIFEdipine ER  60 mg Oral BID    heparin  5,000 Units Subcutaneous Q8H FERNANDO    predniSONE  20 mg Oral BID    carvedilol  12.5 mg Oral BID with meals       Assessment & Plan:     Acute left sided low back pain with left sided sciatica  Likely 2/2 fall  Imaging reviewed  Pain control PRN  PMR on consult - recommend home vs  [facility]  Appreciate PT/OT recs  Currently awaiting placement  Possible discharge later today  HTN  BP elevated on admission  Continue home meds  Hydralazine PRN  Monitor vitals  Hypothyroidism  Continue home meds  CAD  Continue home meds      Plan of care discussed with patient or family at bedside.      Supplementary Documentation:     Quality:  DVT Prophylaxis: heparin s/c  CODE status: Full      Estimated date of discharge: TBD  Discharge is dependent on: clinical stability  At this point Mr. Diggs is expected to be discharge to: home                  Aixa Orr MD  Hospitalist    MDM: High, I personally reviewed the available laboratories, imaging including CT, XR. I discussed the case with RN. I ordered laboratories, studies including AM labs.  Medical decision making high, risk is high.       The 21st Century Cures Act makes medical notes like these available to patients in the interest of transparency. Please be advised this is a medical document. Medical documents are intended to carry relevant information, facts as evident, and the clinical opinion of the practitioner. The medical note is intended as peer to peer communication and may appear blunt or direct. It is written in medical language and may contain abbreviations or verbiage that are unfamiliar.

## 2024-03-13 VITALS
OXYGEN SATURATION: 96 % | RESPIRATION RATE: 18 BRPM | BODY MASS INDEX: 24 KG/M2 | TEMPERATURE: 99 F | HEART RATE: 90 BPM | DIASTOLIC BLOOD PRESSURE: 50 MMHG | SYSTOLIC BLOOD PRESSURE: 136 MMHG | WEIGHT: 145.31 LBS

## 2024-03-13 LAB
ALBUMIN SERPL-MCNC: 3.4 G/DL (ref 3.2–4.8)
ALBUMIN/GLOB SERPL: 0.9 {RATIO} (ref 1–2)
ALP LIVER SERPL-CCNC: 31 U/L
ALT SERPL-CCNC: 46 U/L
ANION GAP SERPL CALC-SCNC: 7 MMOL/L (ref 0–18)
AST SERPL-CCNC: 38 U/L (ref ?–34)
BASOPHILS # BLD: 0 X10(3) UL (ref 0–0.2)
BASOPHILS NFR BLD: 0 %
BILIRUB SERPL-MCNC: 0.4 MG/DL (ref 0.2–1.1)
BUN BLD-MCNC: 35 MG/DL (ref 9–23)
BUN/CREAT SERPL: 24.5 (ref 10–20)
CALCIUM BLD-MCNC: 8.5 MG/DL (ref 8.7–10.4)
CHLORIDE SERPL-SCNC: 107 MMOL/L (ref 98–112)
CO2 SERPL-SCNC: 22 MMOL/L (ref 21–32)
CREAT BLD-MCNC: 1.43 MG/DL
DEPRECATED RDW RBC AUTO: 55.8 FL (ref 35.1–46.3)
EGFRCR SERPLBLD CKD-EPI 2021: 54 ML/MIN/1.73M2 (ref 60–?)
EOSINOPHIL # BLD: 0 X10(3) UL (ref 0–0.7)
EOSINOPHIL NFR BLD: 0 %
ERYTHROCYTE [DISTWIDTH] IN BLOOD BY AUTOMATED COUNT: 19.9 % (ref 11–15)
GLOBULIN PLAS-MCNC: 4 G/DL (ref 2.8–4.4)
GLUCOSE BLD-MCNC: 126 MG/DL (ref 70–99)
HCT VFR BLD AUTO: 31.7 %
HGB BLD-MCNC: 10.6 G/DL
LYMPHOCYTES NFR BLD: 1 X10(3) UL (ref 1–4)
LYMPHOCYTES NFR BLD: 8 %
MCH RBC QN AUTO: 27.2 PG (ref 26–34)
MCHC RBC AUTO-ENTMCNC: 33.4 G/DL (ref 31–37)
MCV RBC AUTO: 81.3 FL
METAMYELOCYTES # BLD: 0.13 X10(3) UL
METAMYELOCYTES NFR BLD: 1 %
MONOCYTES # BLD: 0.38 X10(3) UL (ref 0.1–1)
MONOCYTES NFR BLD: 3 %
NEUTROPHILS # BLD AUTO: 9.36 X10 (3) UL (ref 1.5–7.7)
NEUTROPHILS NFR BLD: 87 %
NEUTS BAND NFR BLD: 1 %
NEUTS HYPERSEG # BLD: 11 X10(3) UL (ref 1.5–7.7)
OSMOLALITY SERPL CALC.SUM OF ELEC: 292 MOSM/KG (ref 275–295)
PLATELET # BLD AUTO: 194 10(3)UL (ref 150–450)
PLATELET MORPHOLOGY: NORMAL
POTASSIUM SERPL-SCNC: 4.7 MMOL/L (ref 3.5–5.1)
PROT SERPL-MCNC: 7.4 G/DL (ref 5.7–8.2)
RBC # BLD AUTO: 3.9 X10(6)UL
SODIUM SERPL-SCNC: 136 MMOL/L (ref 136–145)
TOTAL CELLS COUNTED BLD: 100
WBC # BLD AUTO: 12.5 X10(3) UL (ref 4–11)

## 2024-03-13 PROCEDURE — 99239 HOSP IP/OBS DSCHRG MGMT >30: CPT | Performed by: HOSPITALIST

## 2024-03-13 RX ORDER — HYDROCODONE BITARTRATE AND ACETAMINOPHEN 5; 325 MG/1; MG/1
1 TABLET ORAL EVERY 6 HOURS PRN
Qty: 20 TABLET | Refills: 0 | Status: SHIPPED | OUTPATIENT
Start: 2024-03-13

## 2024-03-13 NOTE — PLAN OF CARE
Problem: Patient Centered Care  Goal: Patient preferences are identified and integrated in the patient's plan of care  Description: Interventions:  - What would you like us to know as we care for you? From home w/ wife  - Provide timely, complete, and accurate information to patient/family  - Incorporate patient and family knowledge, values, beliefs, and cultural backgrounds into the planning and delivery of care  - Encourage patient/family to participate in care and decision-making at the level they choose  - Honor patient and family perspectives and choices  Outcome: Adequate for Discharge     Problem: Patient/Family Goals  Goal: Patient/Family Long Term Goal  Description: Patient's Long Term Goal: Go home    Interventions:  - Diagnostic testing  - Follow MD orders  - See additional Care Plan goals for specific interventions  Outcome: Adequate for Discharge  Goal: Patient/Family Short Term Goal  Description: Patient's Short Term Goal: Decreased pain    Interventions:   - Pain management  - PT/OT  - Follow MD orders  - See additional Care Plan goals for specific interventions  Outcome: Adequate for Discharge     Problem: MUSCULOSKELETAL - ADULT  Goal: Return mobility to safest level of function  Description: INTERVENTIONS:  - Assess patient stability and activity tolerance for standing, transferring and ambulating w/ or w/o assistive devices  - Assist with transfers and ambulation using safe patient handling equipment as needed  - Ensure adequate protection for wounds/incisions during mobilization  - Obtain PT/OT consults as needed  - Advance activity as appropriate  - Communicate ordered activity level and limitations with patient/family  Outcome: Adequate for Discharge     Problem: Impaired Functional Mobility  Goal: Achieve highest/safest level of mobility/gait  Description: Interventions:  - Assess patient's functional ability and stability  - Promote increasing activity/tolerance for mobility and gait  -  Educate and engage patient/family in tolerated activity level and precautions    Outcome: Adequate for Discharge     Problem: PAIN - ADULT  Goal: Verbalizes/displays adequate comfort level or patient's stated pain goal  Description: INTERVENTIONS:  - Encourage pt to monitor pain and request assistance  - Assess pain using appropriate pain scale  - Administer analgesics based on type and severity of pain and evaluate response  - Implement non-pharmacological measures as appropriate and evaluate response  - Consider cultural and social influences on pain and pain management  - Manage/alleviate anxiety  - Utilize distraction and/or relaxation techniques  - Monitor for opioid side effects  - Notify MD/LIP if interventions unsuccessful or patient reports new pain  - Anticipate increased pain with activity and pre-medicate as appropriate  Outcome: Adequate for Discharge     Problem: SAFETY ADULT - FALL  Goal: Free from fall injury  Description: INTERVENTIONS:  - Assess pt frequently for physical needs  - Identify cognitive and physical deficits and behaviors that affect risk of falls.  - [place] fall precautions as indicated by assessment.  - Educate pt/family on patient safety including physical limitations  - Instruct pt to call for assistance with activity based on assessment  - Modify environment to reduce risk of injury  - Provide assistive devices as appropriate  - Consider OT/PT consult to assist with strengthening/mobility  - Encourage toileting schedule  Outcome: Adequate for Discharge     Problem: DISCHARGE PLANNING  Goal: Discharge to home or other facility with appropriate resources  Description: INTERVENTIONS:  - Identify barriers to discharge w/pt and caregiver  - Include patient/family/discharge partner in discharge planning  - Arrange for needed discharge resources and transportation as appropriate  - Identify discharge learning needs (meds, wound care, etc)  - Arrange for interpreters to assist at  discharge as needed  - Consider post-discharge preferences of patient/family/discharge partner  - Complete POLST form as appropriate  - Assess patient's ability to be responsible for managing their own health  - Refer to Case Management Department for coordinating discharge planning if the patient needs post-hospital services based on physician/LIP order or complex needs related to functional status, cognitive ability or social support system  Outcome: Adequate for Discharge       Pt adequate for discharge. PIV removed. AVS completed. Discharge education explained and all questions answered by this RN. Pt transported via [facility]. All belongings taken with pt. Nurse  emptied. This RN called [facility] at 1510 to give report, was sent to voicemail. Attempted again at 1515 and gave report to the nurse supervisor.

## 2024-03-13 NOTE — CM/SW NOTE
03/13/24 1241   Discharge disposition   Expected discharge disposition subacute   Post Acute Care Provider   ([facility])   Discharge transportation [facility]     Pt discussed during nursing rounds. Pt is stable for dc today. MD dc order entered. Pt will dc to [facility] for [facility], cheryl Dillard confirmed bed is available today. Pt and spouse agreeable to transfer today and cost of transport which will be $45. [facility] scheduled for 3pm .    Number for nurse report is [phone].    Plan: [facility] for [facility] today.    / to remain available for support and/or discharge planning.     CLEMENTINE CandelarioN    [phone]

## 2024-03-13 NOTE — DISCHARGE SUMMARY
[facility]  part of [facility]    Discharge Summary    Maximo Diggs JrCoreen Patient Status:  Inpatient    1955 MRN [id number]   Location [facility] 5SW/SE Attending Geoff Mckeon MD   Hosp Day # 3 PCP Lito Pedroza MD     Date of Admission: 3/9/2024 Disposition: SNF     Date of Discharge: 3/13/24    Admitting Diagnosis: Pain of left humerus [M89.8X2]  Acute left-sided low back pain with left-sided sciatica [M54.42]    Hospital Discharge Diagnoses: L sided low back pain, L sciatica    Lace+ Score: 59  59-90 High Risk  29-58 Medium Risk  0-28   Low Risk.    TCM Follow-Up Recommendation:  LACE > 58: High Risk of readmission after discharge from the hospital.    Problem List:   Patient Active Problem List   Diagnosis    ESR raised    Cramps, extremity    Enthesopathy of wrist and carpus    Calcium pyrophosphate arthropathy    Stiffness of finger joint, right    SX/GLOBAL/  /SCSC/ARTHROTOMY DEBRIDEMENT AND BIOPSY FOR CRYSTALS RIGHT INDEX AND LONG FINGERS MP / DOS 10-14-15 / EXP 16    Chondrocostal junction syndrome (tietze)    Anterior chest wall pain    Trigger finger, right middle finger    Complex regional pain syndrome type 1 of left upper extremity    Left arm pain    PIERCE (acute kidney injury) ([facility])    Acute nonintractable headache, unspecified headache type    Strain of lumbar region, initial encounter    Weakness of both lower extremities    Contusion of left hip, initial encounter    GBS (Guillain Elizabeth syndrome) ([facility])    Resistant hypertension    Acute left-sided low back pain with left-sided sciatica    Pain of left humerus       Reason for Admission: Fall    Physical Exam:   Vitals:    24 1434   BP: 136/50   Pulse: 90   Resp: 18   Temp: 98.6 °F (37 °C)   Gen: NAD AO x3  Chest: good air entry CTABL  CVS: normal s1 and s2 RR  Abd: NABS soft NT ND  Neuro: CN 2-12 grossly intact  Ext: no edema in bilateral LE      History of Present Illness:    Per Dr. Felix  This is a 68 year oldmale who presented after fall at home.  Patient stated he was previously diagnosed with Guillain-Barré syndrome.  Since that time has had persistent weakness in his lower extremities.  Patient states he was working with physical therapy.  After returning home, he went to exercise in his home when he felt his legs become weak causing him to fall.  Patient stated he fell backwards.  Patient denied head trauma or loss of consciousness.  After the fall he had increased pain in left lower back.  Patient denied new numbness or tingling.  Patient denied loss of bowel or bladder control.  Patient reported pain was increased with even minimal movements.  He was attempted to be pain controlled in the ED, but was unable to ambulate.  Patient was recommended for admission for increased pain management and PT/OT.  At time of interview, patient otherwise denied chest pain, shortness of breath, abdominal pain, nausea vomit, fever or chills     Hospital Course:   Acute left sided low back pain with left sided sciatica  Likely 2/2 fall  Imaging reviewed  Pain control PRN  PMR on consult - recommend [facility]  Appreciate PT/OT recs  Currently awaiting placement  Stable for dc  HTN  BP elevated on admission  Continue home meds  BP improved now, cont meds below  Hypothyroidism  Continue home meds  CAD  Continue home meds    Consultations: PMR    Procedures: None    Complications: none    Discharge Condition: Stable    Discharge Medications:      Discharge Medications        CONTINUE taking these medications        Instructions Prescription details   alendronate 70 MG Tabs  Commonly known as: Fosamax      Take 1 tablet (70 mg total) by mouth once a week. On SUNDAYS only   Refills: 0     alfuzosin ER 10 MG Tb24  Commonly known as: Uroxatral ER      Take 1 tablet (10 mg total) by mouth daily.   Refills: 0     bisoprolol 5 MG Tabs  Commonly known as: Zebeta      Take 1 tablet (5 mg total) by mouth in the  morning and 1 tablet (5 mg total) before bedtime.   Refills: 0     cholecalciferol 25 MCG (1000 UT) Tabs      Take 1 tablet (1,000 Units total) by mouth daily.   Refills: 0     clopidogrel 75 MG Tabs  Commonly known as: Plavix      Take 1 tablet (75 mg total) by mouth daily.   Refills: 0     CoQ10 100 MG Caps      Take 100 mg by mouth in the morning and 100 mg before bedtime.   Refills: 0     cyclobenzaprine 10 MG Tabs  Commonly known as: Flexeril      Take 1 tablet (10 mg total) by mouth 3 (three) times daily as needed for Muscle spasms.   Refills: 0     Esomeprazole Magnesium 20 MG Cpdr  Commonly known as: NEXIUM      Take 1 capsule (20 mg total) by mouth every morning before breakfast.   Refills: 0     hydrALAZINE 100 MG Tabs  Commonly known as: Apresoline      Take 1 tablet (100 mg total) by mouth every 8 (eight) hours.   Quantity: 90 tablet  Refills: 1     HYDROcodone-acetaminophen 5-325 MG Tabs  Commonly known as: Norco      Take 1 tablet by mouth every 6 (six) hours as needed for Pain.   Quantity: 20 tablet  Refills: 0     levothyroxine 25 MCG Tabs  Commonly known as: Synthroid      Take 1 tablet (25 mcg total) by mouth before breakfast.   Refills: 0     NIFEdipine ER 60 MG Tb24  Commonly known as: Procardia-XL      Take 1 tablet (60 mg total) by mouth in the morning and 1 tablet (60 mg total) before bedtime.   Refills: 0     ondansetron 4 MG Tbdp  Commonly known as: Zofran-ODT      Take 1 tablet (4 mg total) by mouth every 8 (eight) hours as needed for Nausea.   Quantity: 6 tablet  Refills: 0     predniSONE 10 MG Tabs  Commonly known as: Deltasone      Take 2 tablets (20 mg total) by mouth 2 (two) times daily.   Refills: 0            STOP taking these medications      lidocaine 5 % Ptch  Commonly known as: Lidoderm        pregabalin 75 MG Caps  Commonly known as: Lyrica                  Where to Get Your Medications        Please  your prescriptions at the location directed by your doctor or nurse     Bring a paper prescription for each of these medications  HYDROcodone-acetaminophen 5-325 MG Tabs       Greater than 35 minutes spent, >50% spent counseling re: treatment plan and workup     Geoff Mckeon MD  3/13/2024

## 2024-03-13 NOTE — PLAN OF CARE
No acute changes. Fall precautions in place. Call light in reach.     Problem: Patient Centered Care  Goal: Patient preferences are identified and integrated in the patient's plan of care  Description: Interventions:  - What would you like us to know as we care for you? From home w/ wife  - Provide timely, complete, and accurate information to patient/family  - Incorporate patient and family knowledge, values, beliefs, and cultural backgrounds into the planning and delivery of care  - Encourage patient/family to participate in care and decision-making at the level they choose  - Honor patient and family perspectives and choices  Outcome: Progressing     Problem: Patient/Family Goals  Goal: Patient/Family Long Term Goal  Description: Patient's Long Term Goal: Go home    Interventions:  - Diagnostic testing  - Follow MD orders  - See additional Care Plan goals for specific interventions  Outcome: Progressing  Goal: Patient/Family Short Term Goal  Description: Patient's Short Term Goal: Decreased pain    Interventions:   - Pain management  - PT/OT  - Follow MD orders  - See additional Care Plan goals for specific interventions  Outcome: Progressing     Problem: MUSCULOSKELETAL - ADULT  Goal: Return mobility to safest level of function  Description: INTERVENTIONS:  - Assess patient stability and activity tolerance for standing, transferring and ambulating w/ or w/o assistive devices  - Assist with transfers and ambulation using safe patient handling equipment as needed  - Ensure adequate protection for wounds/incisions during mobilization  - Obtain PT/OT consults as needed  - Advance activity as appropriate  - Communicate ordered activity level and limitations with patient/family  Outcome: Progressing     Problem: Impaired Functional Mobility  Goal: Achieve highest/safest level of mobility/gait  Description: Interventions:  - Assess patient's functional ability and stability  - Promote increasing activity/tolerance for  mobility and gait  - Educate and engage patient/family in tolerated activity level and precautions  - Recommend use of  RW for transfers and ambulation  Outcome: Progressing     Problem: PAIN - ADULT  Goal: Verbalizes/displays adequate comfort level or patient's stated pain goal  Description: INTERVENTIONS:  - Encourage pt to monitor pain and request assistance  - Assess pain using appropriate pain scale  - Administer analgesics based on type and severity of pain and evaluate response  - Implement non-pharmacological measures as appropriate and evaluate response  - Consider cultural and social influences on pain and pain management  - Manage/alleviate anxiety  - Utilize distraction and/or relaxation techniques  - Monitor for opioid side effects  - Notify MD/LIP if interventions unsuccessful or patient reports new pain  - Anticipate increased pain with activity and pre-medicate as appropriate  Outcome: Progressing     Problem: SAFETY ADULT - FALL  Goal: Free from fall injury  Description: INTERVENTIONS:  - Assess pt frequently for physical needs  - Identify cognitive and physical deficits and behaviors that affect risk of falls.  - [place] fall precautions as indicated by assessment.  - Educate pt/family on patient safety including physical limitations  - Instruct pt to call for assistance with activity based on assessment  - Modify environment to reduce risk of injury  - Provide assistive devices as appropriate  - Consider OT/PT consult to assist with strengthening/mobility  - Encourage toileting schedule  Outcome: Progressing     Problem: DISCHARGE PLANNING  Goal: Discharge to home or other facility with appropriate resources  Description: INTERVENTIONS:  - Identify barriers to discharge w/pt and caregiver  - Include patient/family/discharge partner in discharge planning  - Arrange for needed discharge resources and transportation as appropriate  - Identify discharge learning needs (meds, wound care, etc)  -  Arrange for interpreters to assist at discharge as needed  - Consider post-discharge preferences of patient/family/discharge partner  - Complete POLST form as appropriate  - Assess patient's ability to be responsible for managing their own health  - Refer to Case Management Department for coordinating discharge planning if the patient needs post-hospital services based on physician/LIP order or complex needs related to functional status, cognitive ability or social support system  Outcome: Progressing

## 2024-03-14 ENCOUNTER — TELEPHONE (OUTPATIENT)
Dept: NEUROLOGY | Facility: CLINIC | Age: 69
End: 2024-03-14

## 2024-03-14 NOTE — TELEPHONE ENCOUNTER
----- Message from Loretta Orr DO sent at 3/12/2024  1:21 PM CDT -----  Can you please call the patient and let him know that his myasthenia gravis antibodies checked in the hospital were negative? A letter could be sent. Thanks

## 2024-03-14 NOTE — TELEPHONE ENCOUNTER
Normal letter has been mailed to the patient.  Reviewed and electronically signed by: CARLOS Abrams

## 2024-03-15 LAB
METANEPHRINE: 50.2 PG/ML
NORMETANEPHRINE: 78.7 PG/ML
RENIN ACTIVITY: 4.64 NG/ML/HR

## 2024-03-18 PROCEDURE — 99305 1ST NF CARE MODERATE MDM 35: CPT | Performed by: INTERNAL MEDICINE

## 2024-03-19 ENCOUNTER — EXTERNAL FACILITY (OUTPATIENT)
Dept: INTERNAL MEDICINE CLINIC | Facility: CLINIC | Age: 69
End: 2024-03-19

## 2024-03-19 DIAGNOSIS — I10 PRIMARY HYPERTENSION: ICD-10-CM

## 2024-03-19 DIAGNOSIS — M54.40 ACUTE BILATERAL LOW BACK PAIN WITH SCIATICA, SCIATICA LATERALITY UNSPECIFIED: Primary | ICD-10-CM

## 2024-03-19 DIAGNOSIS — E03.9 HYPOTHYROIDISM, UNSPECIFIED TYPE: ICD-10-CM

## 2024-03-19 NOTE — PROGRESS NOTES
Seen 3/18/2024    This is a 68 year oldmale who presented after fall at home.Since he was  diagnosed with gullian barre had persistent weakness in his lower extremities.    Patient stated he fell backwards.  Patient denied head trauma or loss of consciousness.  After the fall he had increased pain in left lower back.  Patient denied new numbness or tingling.  Patient denied loss of bowel or bladder control.  Patient reported pain was increased with even minimal movements.  He was attempted to be pain controlled in the ED, but was unable to ambulate.  Patient was recommended for admission for increased pain management and PT/OT.    PAST MEDICAL HISTORY       Past Medical History:   Diagnosis Date    PIERCE (acute kidney injury) ([facility]) 9/23/2021    Arthritis      CAD (coronary artery disease)      High cholesterol      Neuropathy      Retinal pigmentation      Spine fracture 1998     L3, L4, L5.  Rods placed    Thyroid disease      Ulcer      Unspecified essential hypertension           PAST SURGICAL HISTORY        Past Surgical History:   Procedure Laterality Date    ANGIOPLASTY (CORONARY)   2012     with stent placement x 1    BACK SURGERY        EXCIS JT LINING,PROX I-P JT,EACH Right 10/14/2015     Procedure: ARTHROTOMY OF FINGER JOINT INTERPHALANGEAL;  Surgeon: Olvin Vences MD;  Location: [facility]    EXCIS JT LINING,PROX I-P JT,EACH Right 10/14/2015     Procedure: ARTHROTOMY OF FINGER JOINT INTERPHALANGEAL;  Surgeon: Olvin Vences MD;  Location: [facility]    OTHER SURGICAL HISTORY   2000,2002, 2004, 2006     back surgery     OTHER SURGICAL HISTORY   1982     \"ulcer surgery\"    OTHER SURGICAL HISTORY Right 10/14/15     right arthrotomy, debridement, synovectomy of the joints w/biopsy right index and long finger MP joint    PATIENT DOCUMENTED NOT TO HAVE EXPERIENCED ANY OF THE FOLLOWING EVENTS Right 10/14/2015     Procedure: ARTHROTOMY OF FINGER JOINT INTERPHALANGEAL;  Surgeon: Olvin Vences MD;   Location: [facility]    PATIENT WITH PREOPERATIVE ORDER FOR IV ANTIBIOTIC SURGICAL SITE INFECT Right 10/14/2015     Procedure: ARTHROTOMY OF FINGER JOINT INTERPHALANGEAL;  Surgeon: Olvin Vences MD;  Location: [facility]         ALLERGIES   Aspirin, Clavulanic acid, Losartan, Penicillins, Seafood, Iodine (topical), Pregabalin, and Seasonal    Medication reviewed        SOCIAL HISTORY  Social History           Socioeconomic History    Marital status:    Tobacco Use    Smoking status: Never    Smokeless tobacco: Never   Vaping Use    Vaping Use: Never used   Substance and Sexual Activity    Alcohol use: No    Drug use: No         FAMILY HISTORY        Family History   Problem Relation Age of Onset    Hypertension Father      Cancer Father           prostate cancer    Hypertension Mother      Diabetes Sister           PHYSICAL EXAM       GENERAL:  Awake and alert, in no acute distress.  HEART:  Regular rhythm.  Regular rate   LUNGS:    No crackles or wheezes   ABDOMEN: Soft and non-tender. MUSCULOSKELETAL: Decreased range of motion of left lower extremity due to pain.  EXTREMITIES: No edema appreciated  PSYCHIATRIC: Normal mood   a/p    Acute left sided low back pain with left sided sciatica-likely due to fall continue with PT OT pain medication as needed  HTN-blood pressure continue with current care  Hypothyroidism  Continue with current med   ptot

## 2024-03-20 PROCEDURE — 99308 SBSQ NF CARE LOW MDM 20: CPT | Performed by: INTERNAL MEDICINE

## 2024-03-21 ENCOUNTER — EXTERNAL FACILITY (OUTPATIENT)
Dept: INTERNAL MEDICINE CLINIC | Facility: CLINIC | Age: 69
End: 2024-03-21

## 2024-03-21 DIAGNOSIS — M54.40 ACUTE LEFT-SIDED LOW BACK PAIN WITH SCIATICA, SCIATICA LATERALITY UNSPECIFIED: Primary | ICD-10-CM

## 2024-03-21 DIAGNOSIS — I10 PRIMARY HYPERTENSION: ICD-10-CM

## 2024-03-21 NOTE — PROGRESS NOTES
seen 3/20/2024    PAST MEDICAL HISTORY    Past Medical History:  Diagnosis Date   PIERCE (acute kidney injury) ([facility]) 9/23/2021   Arthritis     CAD (coronary artery disease)     High cholesterol     Neuropathy     Retinal pigmentation     Spine fracture 1998    L3, L4, L5. Rods placed   Thyroid disease     Ulcer     Unspecified essential hypertension         PAST SURGICAL HISTORY    Past Surgical History:  Procedure Laterality Date   ANGIOPLASTY (CORONARY)   2012    with stent placement x 1   BACK SURGERY       EXCIS JT LINING,PROX I-P JT,EACH Right 10/14/2015    Procedure: ARTHROTOMY OF FINGER JOINT INTERPHALANGEAL; Surgeon: Olvin Vences MD; Location: [facility]   EXCIS JT LINING,PROX I-P JT,EACH Right 10/14/2015    Procedure: ARTHROTOMY OF FINGER JOINT INTERPHALANGEAL; Surgeon: Olvin Vences MD; Location: [facility]   OTHER SURGICAL HISTORY   2000,2002, 2004, 2006    back surgery   OTHER SURGICAL HISTORY   1982    \"ulcer surgery\"   OTHER SURGICAL HISTORY Right 10/14/15    right arthrotomy, debridement, synovectomy of the joints w/biopsy right index and long finger MP joint   PATIENT DOCUMENTED NOT TO HAVE EXPERIENCED ANY OF THE FOLLOWING EVENTS Right 10/14/2015    Procedure: ARTHROTOMY OF FINGER JOINT INTERPHALANGEAL; Surgeon: Olvin Vences MD; Location: [facility]   PATIENT WITH PREOPERATIVE ORDER FOR IV ANTIBIOTIC SURGICAL SITE INFECT Right 10/14/2015    Procedure: ARTHROTOMY OF FINGER JOINT INTERPHALANGEAL; Surgeon: Olvin Vences MD; Location: [facility]        ALLERGIES  Aspirin, Clavulanic acid, Losartan, Penicillins, Seafood, Iodine (topical), Pregabalin, and Seasonal    Medication reviewed        SOCIAL HISTORY  Social History       Socioeconomic History   Marital status:   Tobacco Use   Smoking status: Never   Smokeless tobacco: Never  Vaping Use   Vaping Use: Never used  Substance and Sexual Activity   Alcohol use: No   Drug use: No        FAMILY HISTORY    Family  History  Problem Relation Age of Onset   Hypertension Father     Cancer Father      prostate cancer   Hypertension Mother     Diabetes Sister        ros:  still has left sided back pain   PHYSICAL EXAM    GENERAL: Awake and alert, in no acute distress.  HEART: Regular rhythm. Regular rate  LUNGS: No crackles or wheezes  ABDOMEN: Soft and non-tender. MUSCULOSKELETAL: Decreased range of motion of left lower extremity due to pain.  EXTREMITIES: No edema appreciated  PSYCHIATRIC: Normal mood   a/p  plan: will add lidocain patch for his back pain, continue with ptot     1. Acute left sided low back pain with left sided sciatica-likely due to fall continue with PT OT pain medication as needed    2. HTN-blood pressure continue with current care  3. Hypothyroidism  Continue with current med  ptot

## 2024-10-24 ENCOUNTER — HOSPITAL ENCOUNTER (EMERGENCY)
Facility: HOSPITAL | Age: 69
Discharge: HOME | End: 2024-10-24
Attending: STUDENT IN AN ORGANIZED HEALTH CARE EDUCATION/TRAINING PROGRAM
Payer: MEDICARE

## 2024-10-24 VITALS
OXYGEN SATURATION: 99 % | HEIGHT: 65 IN | HEART RATE: 61 BPM | TEMPERATURE: 98 F | BODY MASS INDEX: 24.66 KG/M2 | WEIGHT: 148 LBS | DIASTOLIC BLOOD PRESSURE: 56 MMHG | RESPIRATION RATE: 18 BRPM | SYSTOLIC BLOOD PRESSURE: 207 MMHG

## 2024-10-24 DIAGNOSIS — I10 PRIMARY HYPERTENSION: ICD-10-CM

## 2024-10-24 DIAGNOSIS — L03.113 CELLULITIS OF RIGHT UPPER EXTREMITY: Primary | ICD-10-CM

## 2024-10-24 LAB
ANION GAP SERPL CALC-SCNC: 10 MMOL/L (ref 0–18)
BASOPHILS # BLD AUTO: 0.03 X10(3) UL (ref 0–0.2)
BASOPHILS NFR BLD AUTO: 0.3 %
BUN BLD-MCNC: 38 MG/DL (ref 9–23)
BUN/CREAT SERPL: 22.6 (ref 10–20)
CALCIUM BLD-MCNC: 8.7 MG/DL (ref 8.7–10.4)
CHLORIDE SERPL-SCNC: 111 MMOL/L (ref 98–112)
CO2 SERPL-SCNC: 19 MMOL/L (ref 21–32)
CREAT BLD-MCNC: 1.68 MG/DL
DEPRECATED RDW RBC AUTO: 48.6 FL (ref 35.1–46.3)
EGFRCR SERPLBLD CKD-EPI 2021: 44 ML/MIN/1.73M2 (ref 60–?)
EOSINOPHIL # BLD AUTO: 0 X10(3) UL (ref 0–0.7)
EOSINOPHIL NFR BLD AUTO: 0 %
ERYTHROCYTE [DISTWIDTH] IN BLOOD BY AUTOMATED COUNT: 15.5 % (ref 11–15)
GLUCOSE BLD-MCNC: 89 MG/DL (ref 70–99)
HCT VFR BLD AUTO: 37 %
HGB BLD-MCNC: 11.7 G/DL
IMM GRANULOCYTES # BLD AUTO: 0.26 X10(3) UL (ref 0–1)
IMM GRANULOCYTES NFR BLD: 2.4 %
LYMPHOCYTES # BLD AUTO: 0.82 X10(3) UL (ref 1–4)
LYMPHOCYTES NFR BLD AUTO: 7.4 %
MCH RBC QN AUTO: 27 PG (ref 26–34)
MCHC RBC AUTO-ENTMCNC: 31.6 G/DL (ref 31–37)
MCV RBC AUTO: 85.5 FL
MONOCYTES # BLD AUTO: 0.45 X10(3) UL (ref 0.1–1)
MONOCYTES NFR BLD AUTO: 4.1 %
NEUTROPHILS # BLD AUTO: 9.46 X10 (3) UL (ref 1.5–7.7)
NEUTROPHILS # BLD AUTO: 9.46 X10(3) UL (ref 1.5–7.7)
NEUTROPHILS NFR BLD AUTO: 85.8 %
OSMOLALITY SERPL CALC.SUM OF ELEC: 299 MOSM/KG (ref 275–295)
PLATELET # BLD AUTO: 164 10(3)UL (ref 150–450)
POTASSIUM SERPL-SCNC: 4.3 MMOL/L (ref 3.5–5.1)
RBC # BLD AUTO: 4.33 X10(6)UL
SODIUM SERPL-SCNC: 140 MMOL/L (ref 136–145)
WBC # BLD AUTO: 11 X10(3) UL (ref 4–11)

## 2024-10-24 PROCEDURE — 96374 THER/PROPH/DIAG INJ IV PUSH: CPT

## 2024-10-24 PROCEDURE — 80048 BASIC METABOLIC PNL TOTAL CA: CPT | Performed by: STUDENT IN AN ORGANIZED HEALTH CARE EDUCATION/TRAINING PROGRAM

## 2024-10-24 PROCEDURE — 99285 EMERGENCY DEPT VISIT HI MDM: CPT

## 2024-10-24 PROCEDURE — 85025 COMPLETE CBC W/AUTO DIFF WBC: CPT | Performed by: STUDENT IN AN ORGANIZED HEALTH CARE EDUCATION/TRAINING PROGRAM

## 2024-10-24 PROCEDURE — 99284 EMERGENCY DEPT VISIT MOD MDM: CPT

## 2024-10-24 PROCEDURE — 93005 ELECTROCARDIOGRAM TRACING: CPT

## 2024-10-24 PROCEDURE — 93010 ELECTROCARDIOGRAM REPORT: CPT

## 2024-10-24 RX ORDER — HYDRALAZINE HYDROCHLORIDE 100 MG/1
100 TABLET, FILM COATED ORAL ONCE
Status: COMPLETED | OUTPATIENT
Start: 2024-10-24 | End: 2024-10-24

## 2024-10-24 RX ORDER — METOPROLOL TARTRATE 50 MG
100 TABLET ORAL ONCE
Status: COMPLETED | OUTPATIENT
Start: 2024-10-24 | End: 2024-10-24

## 2024-10-24 RX ORDER — NEBIVOLOL 5 MG/1
10 TABLET ORAL ONCE
Status: DISCONTINUED | OUTPATIENT
Start: 2024-10-24 | End: 2024-10-24

## 2024-10-24 RX ORDER — SULFAMETHOXAZOLE AND TRIMETHOPRIM 800; 160 MG/1; MG/1
1 TABLET ORAL 2 TIMES DAILY
Qty: 14 TABLET | Refills: 0 | Status: SHIPPED | OUTPATIENT
Start: 2024-10-24 | End: 2024-10-31

## 2024-10-24 RX ORDER — NIFEDIPINE 60 MG/1
60 TABLET, EXTENDED RELEASE ORAL ONCE
Status: COMPLETED | OUTPATIENT
Start: 2024-10-24 | End: 2024-10-24

## 2024-10-24 NOTE — ED INITIAL ASSESSMENT (HPI)
Via ems from specialty clinic for HTN. Patient SBP was sustaining in 200s. + blurred vision.   Cellulitis noted to medial right wrist.

## 2024-10-24 NOTE — DISCHARGE INSTRUCTIONS
Thank you for seeking care at [facility] Emergency Department.  You have been seen and evaluated. Your findings were consistent with a skin infection called cellulitis.     We discussed the results of your workup   Please read the instructions provided   If given prescriptions, take as instructed   If you were prescribed antibiotics, ensure that you take these as instructed   If your infection was outlined in ink today, return to the emergency department if the redness extends beyond the area outlined by the pen, if the area affected is increasing, or spreading.    Drink plenty of fluids.    Remember, your care process does not end after your visit today. Please follow-up with your doctor within 1-2 days for a follow-up check to ensure you are  improving, to see if you need any further evaluation/testing, or to evaluate for any alternate diagnoses.    Please return to the emergency department if you develop worsening pain or swelling at the site of your infection, if the redness of your skin is extending beyond the area where it is red today, increasing drainage (pus), worsening discoloration, if you develop high fevers or chills, nausea, vomiting, or severe diarrhea, feeling dizzy or lightheaded, or feeling very ill as these could be signs of worsening infection requiring further medical care. Call or return to the ER if you develop any other new or concerning symptoms as these could be signs of more serious medical illness.

## 2024-10-25 LAB
ATRIAL RATE: 70 BPM
P AXIS: 73 DEGREES
P-R INTERVAL: 108 MS
Q-T INTERVAL: 392 MS
QRS DURATION: 78 MS
QTC CALCULATION (BEZET): 423 MS
R AXIS: 61 DEGREES
T AXIS: 190 DEGREES
VENTRICULAR RATE: 70 BPM

## 2024-10-25 NOTE — ED PROVIDER NOTES
[facility] Emergency Department Note  Patient: Maximo Diggs Jr. Age: 69 year old Sex: male      MRN: [id number]  : 1955    Patient Seen in: [facility] Emergency Department    History     Chief Complaint   Patient presents with    HTN    Cellulitis     Stated Complaint: HTN - did not take meds today    History obtained from: Patient and EMS report    69-year-old male with a past medical history of CAD, hypertension, hyperlipidemia, myositis, GERD, CKD stage III, temporal arteritis on prednisone, retinal pigmentosum presenting by EMS for evaluation of elevated blood pressure readings and right wrist redness.  Patient states that he was receiving his infusions for his temporal arteritis when he was noted to have a area of erythema over his medial distal right forearm.  Was sent to the emergency department for evaluation of possible cellulitis.  Per EMS report, patient also noted to be hypertensive.  Patient states that he did not take his blood pressure medications today.  Patient denies any associated trauma, falls or injury.  He denies any chest pain or shortness of breath.  He denies any headaches, numbness, tingling, weakness, slurred speech or vision changes.  Denies any swelling in his legs.    Review of Systems:  Review of Systems  Positive for stated complaint: HTN - did not take meds today. Constitutional and vital signs reviewed. All other systems reviewed and negative except as noted above.    Patient History:  Past Medical History:    PIERCE (acute kidney injury) ([facility])    Arthritis    CAD (coronary artery disease)    High cholesterol    Neuropathy    Retinal pigmentation    Spine fracture    L3, L4, L5.  Rods placed    Thyroid disease    Ulcer    Unspecified essential hypertension       Past Surgical History:   Procedure Laterality Date    Angioplasty (coronary)      with stent placement x 1    Back surgery      Excis jt lining,prox i-p jt,each Right 10/14/2015    Procedure: ARTHROTOMY  OF FINGER JOINT INTERPHALANGEAL;  Surgeon: Olvin Vences MD;  Location: [facility]    Excis jt lining,prox i-p jt,each Right 10/14/2015    Procedure: ARTHROTOMY OF FINGER JOINT INTERPHALANGEAL;  Surgeon: Olvin Vences MD;  Location: [facility]    Other surgical history  2000,2002, 2004, 2006    back surgery     Other surgical history  1982    \"ulcer surgery\"    Other surgical history Right 10/14/15    right arthrotomy, debridement, synovectomy of the joints w/biopsy right index and long finger MP joint    Patient documented not to have experienced any of the following events Right 10/14/2015    Procedure: ARTHROTOMY OF FINGER JOINT INTERPHALANGEAL;  Surgeon: Olvin Vences MD;  Location: [facility]    Patient with preoperative order for iv antibiotic surgical site infect Right 10/14/2015    Procedure: ARTHROTOMY OF FINGER JOINT INTERPHALANGEAL;  Surgeon: Olvin Vences MD;  Location: [facility]        Family History   Problem Relation Age of Onset    Hypertension Father     Cancer Father         prostate cancer    Hypertension Mother     Diabetes Sister        Specific Social Determinants of Health:   Social History     Socioeconomic History    Marital status:    Tobacco Use    Smoking status: Never    Smokeless tobacco: Never   Vaping Use    Vaping status: Never Used   Substance and Sexual Activity    Alcohol use: No    Drug use: No     Social Drivers of Health     Food Insecurity: No Food Insecurity (3/9/2024)    Food Insecurity     Food Insecurity: Never true   Transportation Needs: No Transportation Needs (3/9/2024)    Transportation Needs     Lack of Transportation: No    [facility]    Social Connections   Housing Stability: Low Risk  (3/9/2024)    Housing Stability     Housing Instability: No           PSFH elements reviewed from today and agreed except as otherwise stated in HPI.    Physical Exam     ED Triage  Vitals   BP 10/24/24 1050 (!) 230/60   Pulse 10/24/24 1050 63   Resp 10/24/24 1050 10   Temp 10/24/24 1049 97.9 °F (36.6 °C)   Temp src 10/24/24 1049 Oral   SpO2 10/24/24 1112 99 %   O2 Device 10/24/24 1112 None (Room air)       Current:BP (!) 207/56   Pulse 61   Temp 97.9 °F (36.6 °C) (Oral)   Resp 18   Ht 165.1 cm (5' 5\")   Wt 67.1 kg   SpO2 99%   BMI 24.63 kg/m²         Physical Exam  Constitutional:       Appearance: He is well-developed.   HENT:      Head: Normocephalic and atraumatic.      Right Ear: External ear normal.      Left Ear: External ear normal.      Nose: Nose normal.   Eyes:      Conjunctiva/sclera: Conjunctivae normal.      Pupils: Pupils are equal, round, and reactive to light.   Cardiovascular:      Rate and Rhythm: Normal rate and regular rhythm.      Heart sounds: Normal heart sounds.   Pulmonary:      Effort: Pulmonary effort is normal.      Breath sounds: Normal breath sounds.   Abdominal:      General: Bowel sounds are normal.      Palpations: Abdomen is soft.      Tenderness: There is no abdominal tenderness.   Musculoskeletal:         General: Normal range of motion.      Cervical back: Normal range of motion and neck supple.   Skin:     General: Skin is warm and dry.      Findings: No rash.      Comments: Approximately 4 cm x 4 cm area of erythema over the medial aspect of the distal right forearm with overlying warmth to touch and mild tenderness.  Normal active and passive range of motion of the right wrist and hand with no pain with range of motion.  Radial pulses are equal bilaterally.  No sensory deficits in the right upper extremity.   Neurological:      General: No focal deficit present.      Mental Status: He is alert and oriented to person, place, and time.      Deep Tendon Reflexes: Reflexes are normal and symmetric.   Psychiatric:         Mood and Affect: Mood normal.         Behavior: Behavior normal.         ED Course   Labs:   Labs Reviewed   BASIC METABOLIC PANEL  (8) - Abnormal; Notable for the following components:       Result Value    CO2 19.0 (*)     BUN 38 (*)     Creatinine 1.68 (*)     BUN/CREA Ratio 22.6 (*)     Calculated Osmolality 299 (*)     eGFR-Cr 44 (*)     All other components within normal limits   CBC WITH DIFFERENTIAL WITH PLATELET - Abnormal; Notable for the following components:    HGB 11.7 (*)     HCT 37.0 (*)     RDW-SD 48.6 (*)     RDW 15.5 (*)     Neutrophil Absolute Prelim 9.46 (*)     Neutrophil Absolute 9.46 (*)     Lymphocyte Absolute 0.82 (*)     All other components within normal limits   RAINBOW DRAW LAVENDER   RAINBOW DRAW LIGHT GREEN     Radiology findings:  I personally reviewed the images.   No results found.    EKG as interpreted by me: Ventricular rate 70, normal sinus rhythm, normal axis, no VA interval prolongation, narrow QRS, QTc 423 ms, no ST segment elevations or depressions, T wave inversions in leads I, aVL, V4 through V6; unchanged from prior EKG  Cardiac Monitor: Interpreted by me.   Pulse Readings from Last 1 Encounters:   10/24/24 61   , sinus,     External non-ED records reviewed independently by me: Nephrology note from yesterday reviewed confirms patient is on Bystolic 10 mg twice daily, hydralazine 100 mg 3 times daily, nifedipine XL 60 mg p.o. daily.  Follows for hypertension and was found to have high salt diet recently.  Plan was for further blood pressure monitoring and discussion of additional diuretic if blood pressures remain elevated.    MDM   69-year-old male with a past medical history of CAD, hypertension, hyperlipidemia, myositis, GERD, CKD stage III, temporal arteritis on prednisone, retinal pigmentosum presenting by EMS for evaluation of concern for cellulitis as well as elevated blood pressure readings.  Patient did not take his blood pressure medications this morning which is likely the underlying etiology of his elevated blood pressures today.  He is no signs or symptoms of hypertensive emergency with no  chest pain or shortness of breath.  No neurologic deficits.  No lower extremity edema.  No evidence of hypertensive nephropathy on his labs.  His right upper extremity is concerning for possible early cellulitis.  He has no significant leukocytosis.  No systemic symptoms of infection.  No evidence of sepsis, severe sepsis or septic shock.  Started on Ancef for antibiotics.  Labs overall reassuring.  He was given his home blood pressure medications with transient improvement of his blood pressure.  Throughout stay in the emergency department, blood pressure did begin to trend upwards however patient is now overdue for his noontime medications.  Again, no evidence of hypertensive emergency or urgency.  Has a well-established history of hypertension follows closely with nephrologist.  Discussed continued blood pressure monitoring over the next week with blood pressure diary.  Instructed to follow-up closely with PCP or nephrologist for discussion of alteration of hypertension management should his blood pressures remain elevated.  Patient otherwise stable for discharge home at this time and course of oral antibiotics for early right upper extremity cellulitis.  Return precautions including any worsening of the redness, pain or fevers were discussed as well as any development of chest pain or trouble breathing or neurologic deficits.  Patient expressed understanding and agreement with this plan.  Stable for discharge home at this time.     Differential diagnoses considered includes, but is not limited to:  Cellulitis, hypertension, hypertensive emergency    Will obtain the following tests: CBC, BMP  Please see ED course for my independent review of these tests/imaging results.    Initial Medications/Therapeutics administered: Home hydralazine and nifedipine.  Given lack of ability of Bystolic in the emergency department, he was given 100 mg of metoprolol.  IV Ancef    Chronic conditions affecting care: Hypertension,  hyperlipidemia, CAD, myositis, GERD, CKD stage III, temporal arteritis on prednisone, retinal pigmentosa    Workup and medications considered but not ordered: Considered admissionconsidered admission        Disposition and Plan     Clinical Impression:  1. Cellulitis of right upper extremity    2. Primary hypertension        Disposition:  Discharge    Follow-up:  Lito Pedroza MD  [address] [id number] [phone]    Schedule an appointment as soon as possible for a visit in 2 day(s)  for repeat blood pressure check      Medications Prescribed:  Discharge Medication List as of 10/24/2024  3:11 PM        START taking these medications    Details   sulfamethoxazole-trimethoprim -160 MG Oral Tab per tablet Take 1 tablet by mouth 2 (two) times daily for 7 days., Normal, Disp-14 tablet, R-0               This note may have been created using voice dictation technology and may include inadvertent errors.      Cecilia Edouard MD  Emergency Medicine

## 2025-01-18 ENCOUNTER — HOSPITAL ENCOUNTER (INPATIENT)
Facility: HOSPITAL | Age: 70
LOS: 4 days | Discharge: HOME HEALTH | End: 2025-01-22
Attending: EMERGENCY MEDICINE | Admitting: INTERNAL MEDICINE
Payer: MEDICARE

## 2025-01-18 ENCOUNTER — APPOINTMENT (OUTPATIENT)
Dept: GENERAL RADIOLOGY | Facility: HOSPITAL | Age: 70
End: 2025-01-18
Attending: HOSPITALIST
Payer: MEDICARE

## 2025-01-18 ENCOUNTER — APPOINTMENT (OUTPATIENT)
Dept: GENERAL RADIOLOGY | Facility: HOSPITAL | Age: 70
End: 2025-01-18
Attending: EMERGENCY MEDICINE
Payer: MEDICARE

## 2025-01-18 ENCOUNTER — APPOINTMENT (OUTPATIENT)
Dept: CT IMAGING | Facility: HOSPITAL | Age: 70
End: 2025-01-18
Attending: EMERGENCY MEDICINE
Payer: MEDICARE

## 2025-01-18 DIAGNOSIS — R11.2 NAUSEA AND VOMITING, UNSPECIFIED VOMITING TYPE: ICD-10-CM

## 2025-01-18 DIAGNOSIS — N17.9 AKI (ACUTE KIDNEY INJURY): Primary | ICD-10-CM

## 2025-01-18 DIAGNOSIS — S09.90XA CLOSED HEAD INJURY, INITIAL ENCOUNTER: ICD-10-CM

## 2025-01-18 LAB
ADENOVIRUS PCR:: NOT DETECTED
ALBUMIN SERPL-MCNC: 4.1 G/DL (ref 3.2–4.8)
ALBUMIN/GLOB SERPL: 1.7 {RATIO} (ref 1–2)
ALP LIVER SERPL-CCNC: 43 U/L
ALT SERPL-CCNC: 18 U/L
ANION GAP SERPL CALC-SCNC: 17 MMOL/L (ref 0–18)
AST SERPL-CCNC: 30 U/L (ref ?–34)
B PARAPERT DNA SPEC QL NAA+PROBE: NOT DETECTED
B PERT DNA SPEC QL NAA+PROBE: NOT DETECTED
BASOPHILS # BLD AUTO: 0.06 X10(3) UL (ref 0–0.2)
BASOPHILS NFR BLD AUTO: 0.5 %
BILIRUB SERPL-MCNC: 0.3 MG/DL (ref 0.2–1.1)
BUN BLD-MCNC: 30 MG/DL (ref 9–23)
BUN/CREAT SERPL: 13 (ref 10–20)
C PNEUM DNA SPEC QL NAA+PROBE: NOT DETECTED
CALCIUM BLD-MCNC: 9.2 MG/DL (ref 8.7–10.4)
CHLORIDE SERPL-SCNC: 114 MMOL/L (ref 98–112)
CO2 SERPL-SCNC: 15 MMOL/L (ref 21–32)
CORONAVIRUS 229E PCR:: NOT DETECTED
CORONAVIRUS HKU1 PCR:: NOT DETECTED
CORONAVIRUS NL63 PCR:: NOT DETECTED
CORONAVIRUS OC43 PCR:: NOT DETECTED
CREAT BLD-MCNC: 2.3 MG/DL
DEPRECATED RDW RBC AUTO: 49.7 FL (ref 35.1–46.3)
EGFRCR SERPLBLD CKD-EPI 2021: 30 ML/MIN/1.73M2 (ref 60–?)
EOSINOPHIL # BLD AUTO: 0.04 X10(3) UL (ref 0–0.7)
EOSINOPHIL NFR BLD AUTO: 0.3 %
ERYTHROCYTE [DISTWIDTH] IN BLOOD BY AUTOMATED COUNT: 17.5 % (ref 11–15)
FLUAV RNA SPEC QL NAA+PROBE: NOT DETECTED
FLUBV RNA SPEC QL NAA+PROBE: NOT DETECTED
GLOBULIN PLAS-MCNC: 2.4 G/DL (ref 2–3.5)
GLUCOSE BLD-MCNC: 88 MG/DL (ref 70–99)
HCT VFR BLD AUTO: 35 %
HGB BLD-MCNC: 10.9 G/DL
IMM GRANULOCYTES # BLD AUTO: 0.21 X10(3) UL (ref 0–1)
IMM GRANULOCYTES NFR BLD: 1.6 %
LIPASE SERPL-CCNC: 44 U/L (ref 12–53)
LYMPHOCYTES # BLD AUTO: 1.91 X10(3) UL (ref 1–4)
LYMPHOCYTES NFR BLD AUTO: 14.5 %
MCH RBC QN AUTO: 24.3 PG (ref 26–34)
MCHC RBC AUTO-ENTMCNC: 31.1 G/DL (ref 31–37)
MCV RBC AUTO: 78.1 FL
METAPNEUMOVIRUS PCR:: NOT DETECTED
MONOCYTES # BLD AUTO: 0.72 X10(3) UL (ref 0.1–1)
MONOCYTES NFR BLD AUTO: 5.5 %
MYCOPLASMA PNEUMONIA PCR:: NOT DETECTED
NEUTROPHILS # BLD AUTO: 10.25 X10 (3) UL (ref 1.5–7.7)
NEUTROPHILS # BLD AUTO: 10.25 X10(3) UL (ref 1.5–7.7)
NEUTROPHILS NFR BLD AUTO: 77.6 %
OSMOLALITY SERPL CALC.SUM OF ELEC: 308 MOSM/KG (ref 275–295)
PARAINFLUENZA 1 PCR:: NOT DETECTED
PARAINFLUENZA 2 PCR:: NOT DETECTED
PARAINFLUENZA 3 PCR:: NOT DETECTED
PARAINFLUENZA 4 PCR:: NOT DETECTED
PLATELET # BLD AUTO: 256 10(3)UL (ref 150–450)
POTASSIUM SERPL-SCNC: 5.1 MMOL/L (ref 3.5–5.1)
PROCALCITONIN SERPL-MCNC: 0.23 NG/ML (ref ?–0.05)
PROT SERPL-MCNC: 6.5 G/DL (ref 5.7–8.2)
RBC # BLD AUTO: 4.48 X10(6)UL
RHINOVIRUS/ENTERO PCR:: NOT DETECTED
RSV RNA SPEC QL NAA+PROBE: NOT DETECTED
SARS-COV-2 RNA NPH QL NAA+NON-PROBE: NOT DETECTED
SODIUM SERPL-SCNC: 146 MMOL/L (ref 136–145)
WBC # BLD AUTO: 13.2 X10(3) UL (ref 4–11)

## 2025-01-18 PROCEDURE — 71045 X-RAY EXAM CHEST 1 VIEW: CPT | Performed by: EMERGENCY MEDICINE

## 2025-01-18 PROCEDURE — 99223 1ST HOSP IP/OBS HIGH 75: CPT | Performed by: HOSPITALIST

## 2025-01-18 PROCEDURE — 70450 CT HEAD/BRAIN W/O DYE: CPT | Performed by: EMERGENCY MEDICINE

## 2025-01-18 PROCEDURE — 71045 X-RAY EXAM CHEST 1 VIEW: CPT | Performed by: HOSPITALIST

## 2025-01-18 RX ORDER — CYCLOBENZAPRINE HCL 10 MG
10 TABLET ORAL 3 TIMES DAILY PRN
Status: DISCONTINUED | OUTPATIENT
Start: 2025-01-18 | End: 2025-01-22

## 2025-01-18 RX ORDER — HYDROMORPHONE HYDROCHLORIDE 1 MG/ML
0.4 INJECTION, SOLUTION INTRAMUSCULAR; INTRAVENOUS; SUBCUTANEOUS EVERY 2 HOUR PRN
Status: DISCONTINUED | OUTPATIENT
Start: 2025-01-18 | End: 2025-01-22

## 2025-01-18 RX ORDER — HYDROCODONE BITARTRATE AND ACETAMINOPHEN 5; 325 MG/1; MG/1
1 TABLET ORAL EVERY 6 HOURS PRN
Status: DISCONTINUED | OUTPATIENT
Start: 2025-01-18 | End: 2025-01-22

## 2025-01-18 RX ORDER — ACETAMINOPHEN 325 MG/1
650 TABLET ORAL EVERY 4 HOURS PRN
Status: DISCONTINUED | OUTPATIENT
Start: 2025-01-18 | End: 2025-01-22

## 2025-01-18 RX ORDER — METOPROLOL TARTRATE 50 MG
50 TABLET ORAL
Status: DISCONTINUED | OUTPATIENT
Start: 2025-01-18 | End: 2025-01-22

## 2025-01-18 RX ORDER — NIFEDIPINE 60 MG/1
60 TABLET, EXTENDED RELEASE ORAL 2 TIMES DAILY
Status: DISCONTINUED | OUTPATIENT
Start: 2025-01-19 | End: 2025-01-22

## 2025-01-18 RX ORDER — PREDNISONE 5 MG/1
5 TABLET ORAL DAILY
Status: DISCONTINUED | OUTPATIENT
Start: 2025-01-19 | End: 2025-01-19

## 2025-01-18 RX ORDER — LEVOTHYROXINE SODIUM 25 UG/1
25 TABLET ORAL
Status: DISCONTINUED | OUTPATIENT
Start: 2025-01-19 | End: 2025-01-22

## 2025-01-18 RX ORDER — EZETIMIBE 10 MG/1
10 TABLET ORAL DAILY
COMMUNITY
Start: 2025-01-08 | End: 2026-01-08

## 2025-01-18 RX ORDER — SODIUM CHLORIDE 9 MG/ML
INJECTION, SOLUTION INTRAVENOUS CONTINUOUS
Status: DISCONTINUED | OUTPATIENT
Start: 2025-01-18 | End: 2025-01-19

## 2025-01-18 RX ORDER — TAMSULOSIN HYDROCHLORIDE 0.4 MG/1
0.4 CAPSULE ORAL
Status: DISCONTINUED | OUTPATIENT
Start: 2025-01-19 | End: 2025-01-22

## 2025-01-18 RX ORDER — CLOPIDOGREL BISULFATE 75 MG/1
75 TABLET ORAL DAILY
Status: DISCONTINUED | OUTPATIENT
Start: 2025-01-19 | End: 2025-01-22

## 2025-01-18 RX ORDER — ONDANSETRON 2 MG/ML
4 INJECTION INTRAMUSCULAR; INTRAVENOUS EVERY 4 HOURS PRN
Status: DISCONTINUED | OUTPATIENT
Start: 2025-01-18 | End: 2025-01-22

## 2025-01-18 RX ORDER — HYDROMORPHONE HYDROCHLORIDE 1 MG/ML
0.2 INJECTION, SOLUTION INTRAMUSCULAR; INTRAVENOUS; SUBCUTANEOUS EVERY 2 HOUR PRN
Status: DISCONTINUED | OUTPATIENT
Start: 2025-01-18 | End: 2025-01-22

## 2025-01-18 RX ORDER — EZETIMIBE 10 MG/1
10 TABLET ORAL DAILY
Status: DISCONTINUED | OUTPATIENT
Start: 2025-01-19 | End: 2025-01-22

## 2025-01-18 RX ORDER — HYDROMORPHONE HYDROCHLORIDE 1 MG/ML
0.8 INJECTION, SOLUTION INTRAMUSCULAR; INTRAVENOUS; SUBCUTANEOUS EVERY 2 HOUR PRN
Status: DISCONTINUED | OUTPATIENT
Start: 2025-01-18 | End: 2025-01-22

## 2025-01-18 RX ORDER — HYDRALAZINE HYDROCHLORIDE 25 MG/1
100 TABLET, FILM COATED ORAL 2 TIMES DAILY
Status: DISCONTINUED | OUTPATIENT
Start: 2025-01-18 | End: 2025-01-22

## 2025-01-18 RX ORDER — FAMOTIDINE 40 MG/1
40 TABLET, FILM COATED ORAL AS NEEDED
COMMUNITY

## 2025-01-18 RX ORDER — MORPHINE SULFATE 4 MG/ML
4 INJECTION, SOLUTION INTRAMUSCULAR; INTRAVENOUS ONCE
Status: COMPLETED | OUTPATIENT
Start: 2025-01-18 | End: 2025-01-18

## 2025-01-18 NOTE — PLAN OF CARE
Alert and oriented x4.Monitoring vital signs- stable at this time. No acute changes noted throughout shift. Receiving IV fluids per MD order.  Nauseous - antiemetics given. Tolerating clear liquid diet. Voiding via urinal. Pain medication provided as needed. Fall precautions maintained- bed alarm on, bed locked in lowest position, call light and personal belongings within reach, non-skid socks in place to bilateral feet. Frequent rounding by nursing staff.      Problem: Patient Centered Care  Goal: Patient preferences are identified and integrated in the patient's plan of care  Description: Interventions:  - What would you like us to know as we care for you?   - Provide timely, complete, and accurate information to patient/family  - Incorporate patient and family knowledge, values, beliefs, and cultural backgrounds into the planning and delivery of care  - Encourage patient/family to participate in care and decision-making at the level they choose  - Honor patient and family perspectives and choices  Outcome: Progressing     Problem: Patient/Family Goals  Goal: Patient/Family Long Term Goal  Description: Patient's Long Term Goal: to be discharged home    Interventions:  - monitor labs and vitals   - pain medications   - update pt and family on plan of care   - follow md orders   - See additional Care Plan goals for specific interventions  Outcome: Progressing  Goal: Patient/Family Short Term Goal  Description: Patient's Short Term Goal: to have less pain    Interventions:   - pain medications   - See additional Care Plan goals for specific interventions  Outcome: Progressing     Problem: PAIN - ADULT  Goal: Verbalizes/displays adequate comfort level or patient's stated pain goal  Description: INTERVENTIONS:  - Encourage pt to monitor pain and request assistance  - Assess pain using appropriate pain scale  - Administer analgesics based on type and severity of pain and evaluate response  - Implement  non-pharmacological measures as appropriate and evaluate response  - Consider cultural and social influences on pain and pain management  - Manage/alleviate anxiety  - Utilize distraction and/or relaxation techniques  - Monitor for opioid side effects  - Notify MD/LIP if interventions unsuccessful or patient reports new pain  - Anticipate increased pain with activity and pre-medicate as appropriate  Outcome: Progressing     Problem: RISK FOR INFECTION - ADULT  Goal: Absence of fever/infection during anticipated neutropenic period  Description: INTERVENTIONS  - Monitor WBC  - Administer growth factors as ordered  - Implement neutropenic guidelines  Outcome: Progressing     Problem: SAFETY ADULT - FALL  Goal: Free from fall injury  Description: INTERVENTIONS:  - Assess pt frequently for physical needs  - Identify cognitive and physical deficits and behaviors that affect risk of falls.  - [place] fall precautions as indicated by assessment.  - Educate pt/family on patient safety including physical limitations  - Instruct pt to call for assistance with activity based on assessment  - Modify environment to reduce risk of injury  - Provide assistive devices as appropriate  - Consider OT/PT consult to assist with strengthening/mobility  - Encourage toileting schedule  Outcome: Progressing     Problem: DISCHARGE PLANNING  Goal: Discharge to home or other facility with appropriate resources  Description: INTERVENTIONS:  - Identify barriers to discharge w/pt and caregiver  - Include patient/family/discharge partner in discharge planning  - Arrange for needed discharge resources and transportation as appropriate  - Identify discharge learning needs (meds, wound care, etc)  - Arrange for interpreters to assist at discharge as needed  - Consider post-discharge preferences of patient/family/discharge partner  - Complete POLST form as appropriate  - Assess patient's ability to be responsible for managing their own health  -  Refer to Case Management Department for coordinating discharge planning if the patient needs post-hospital services based on physician/LIP order or complex needs related to functional status, cognitive ability or social support system  Outcome: Progressing

## 2025-01-18 NOTE — H&P
[facility]  part of [facility]    History & Physical    Maximo Diggs Jr. Patient Status:  Inpatient    1955 MRN [id number]   Location [facility] 4W/SW/SE Attending Viri Foote MD   Hosp Day # 0 PCP Lito Pedroza MD     Date:  2025  Date of Admission:  2025    History provided by:patient  Chief Complaint:     Chief Complaint   Patient presents with    Fall       HPI:   Maximo Diggs Jr. is a(n) 69 year old male with h/o CAD, distal LAD stent on plavix, CHF, mild to mod AR, HTN (on Bystolic, Losartan, Nifedipine, Hydralazine), HL, follows at [facility], temporal arteritis on tapering doses of prednisone (about month ago decr to 5 mg), CKD3, BPH, hypothyroidism, retinopathy, arthritis, on alendronate, severe back pain with lumbar stenosis, associated with weakness, eval for elective surgery recently.Now presents to ER after mechanical fall.  Patient reports not feeling well for few weeks, with upset stomach, poor appetite, nausea and vomiting.  In the ER found elevated creatinine and leukocytosis.  No diarrhea.    History     Past Medical History:    PIERCE (acute kidney injury) (HCC)    Arthritis    CAD (coronary artery disease)    High cholesterol    Nausea and vomiting, unspecified vomiting type    Neuropathy    Retinal pigmentation    Spine fracture    L3, L4, L5.  Rods placed    Thyroid disease    Ulcer    Unspecified essential hypertension     Past Surgical History:   Procedure Laterality Date    Angioplasty (coronary)      with stent placement x 1    Back surgery      Excis jt lining,prox i-p jt,each Right 10/14/2015    Procedure: ARTHROTOMY OF FINGER JOINT INTERPHALANGEAL;  Surgeon: Olivn Vencse MD;  Location: [facility]    Excis jt lining,prox i-p jt,each Right 10/14/2015    Procedure: ARTHROTOMY OF FINGER JOINT INTERPHALANGEAL;  Surgeon: Olvin Vences MD;  Location: [facility]    Other surgical history  ,, ,      back surgery     Other surgical history  1982    \"ulcer surgery\"    Other surgical history Right 10/14/15    right arthrotomy, debridement, synovectomy of the joints w/biopsy right index and long finger MP joint    Patient documented not to have experienced any of the following events Right 10/14/2015    Procedure: ARTHROTOMY OF FINGER JOINT INTERPHALANGEAL;  Surgeon: Olvin Vences MD;  Location: [facility]    Patient with preoperative order for iv antibiotic surgical site infect Right 10/14/2015    Procedure: ARTHROTOMY OF FINGER JOINT INTERPHALANGEAL;  Surgeon: Olvin Vences MD;  Location: [facility]     Family History   Problem Relation Age of Onset    Hypertension Father     Cancer Father         prostate cancer    Hypertension Mother     Diabetes Sister      Social History:  Social History     Socioeconomic History    Marital status:    Tobacco Use    Smoking status: Never    Smokeless tobacco: Never   Vaping Use    Vaping status: Never Used   Substance and Sexual Activity    Alcohol use: No    Drug use: No     Social Drivers of Health     Food Insecurity: No Food Insecurity (1/18/2025)    Food Insecurity     Food Insecurity: Never true   Transportation Needs: No Transportation Needs (1/18/2025)    Transportation Needs     Lack of Transportation: No    [facility]    Social Connections   Housing Stability: Low Risk  (1/18/2025)    Housing Stability     Housing Instability: No     Allergies/Medications:   Allergies: Allergies[1]  Medications Prior to Admission   Medication Sig    ezetimibe 10 MG Oral Tab Take 1 tablet (10 mg total) by mouth daily.    HYDROcodone-acetaminophen 5-325 MG Oral Tab Take 1 tablet by mouth every 6 (six) hours as needed for Pain.    hydrALAZINE 100 MG Oral Tab Take 1 tablet (100 mg total) by mouth every 8 (eight) hours. (Patient taking differently: Take 1 tablet (100 mg total) by mouth 2 (two) times  daily.)    famotidine 40 MG Oral Tab Take 1 tablet (40 mg total) by mouth as needed.    [] sulfamethoxazole-trimethoprim -160 MG Oral Tab per tablet Take 1 tablet by mouth 2 (two) times daily for 7 days.    alfuzosin ER 10 MG Oral Tablet 24 Hr Take 1 tablet (10 mg total) by mouth daily.    ondansetron 4 MG Oral Tablet Dispersible Take 1 tablet (4 mg total) by mouth every 8 (eight) hours as needed for Nausea.    predniSONE 10 MG Oral Tab Take 0.5 tablets (5 mg total) by mouth daily.    Esomeprazole Magnesium 20 MG Oral Capsule Delayed Release Take 1 capsule (20 mg total) by mouth every morning before breakfast.    alendronate 70 MG Oral Tab Take 1 tablet (70 mg total) by mouth once a week. On SUNDAYS only    cyclobenzaprine 10 MG Oral Tab Take 1 tablet (10 mg total) by mouth 3 (three) times daily as needed for Muscle spasms.    NIFEdipine 60 MG Oral Tablet 24 Hr Take 1 tablet (60 mg total) by mouth in the morning and 1 tablet (60 mg total) before bedtime.    Vitamin D3 1000 units Oral Tab Take 1 tablet (1,000 Units total) by mouth daily.    Coenzyme Q10 (COQ10) 100 MG Oral Cap Take 200 mg by mouth in the morning and 200 mg before bedtime.    Bisoprolol Fumarate (ZEBETA) 5 MG Oral Tab Take 2 tablets (10 mg total) by mouth in the morning and 2 tablets (10 mg total) before bedtime.    Levothyroxine Sodium (SYNTHROID, LEVOTHROID) 25 MCG Oral Tab Take 1 tablet (25 mcg total) by mouth before breakfast.    clopidogrel 75 MG Oral Tab Take 1 tablet (75 mg total) by mouth daily.       Review of Systems:   Pertinent items are noted in HPI.  Otherwise negative  Comprehensive 12 point ROS performed      Physical Exam:   Vital Signs:  Blood pressure 147/62, pulse 80, temperature 98.4 °F (36.9 °C), temperature source Oral, resp. rate 18, height 5' 5\" (1.651 m), weight 121 lb (54.9 kg), SpO2 98%.     General:  Alert and oriented.  Looks unwell.  Seen with wife  Diffuse skin problem:  None.  Eye:  Pupils are equal, round  and reactive to light, extraocular movements are intact, Normal conjunctiva.  HENT:  Normocephalic, oral mucosa is dry.  Head:  Normocephalic, atraumatic.  Neck:  Supple, non-tender  Respiratory:  Lungs are clear to auscultation, respirations are non-labored, breath sounds are equal, symmetrical chest wall expansion.  Cardiovascular:  Normal rate, regular rhythm, no murmur, no edema.  Gastrointestinal:  Soft, non-tender, non-distended, normal bowel sounds, no organomegaly.  Musculoskeletal: Normal range of motion.  normal strength.  Feet:  Normal pulses.  Neurologic:  Alert, oriented, lower extremities  Cognition and Speech:  Oriented, speech clear and coherent.  Psychiatric:  Cooperative, appropriate mood & affect.        Results:     Lab Results   Component Value Date    WBC 13.2 (H) 01/18/2025    HGB 10.9 (L) 01/18/2025    HCT 35.0 (L) 01/18/2025    .0 01/18/2025    CREATSERUM 2.30 (H) 01/18/2025    BUN 30 (H) 01/18/2025     (H) 01/18/2025    K 5.1 01/18/2025     (H) 01/18/2025    CO2 15.0 (L) 01/18/2025    GLU 88 01/18/2025    CA 9.2 01/18/2025    ALB 4.1 01/18/2025    ALKPHO 43 (L) 01/18/2025    BILT 0.3 01/18/2025    TP 6.5 01/18/2025    AST 30 01/18/2025    ALT 18 01/18/2025    TSH 1.606 02/25/2024     11/15/2021    DDIMER 0.65 (H) 11/15/2021    ESRML 53 (H) 09/23/2021    CRP 0.50 (H) 09/23/2021    MG 2.2 03/10/2024    PHOS 4.1 02/29/2024    TROP <0.045 11/15/2021     02/25/2024    B12 527 02/08/2017       CT BRAIN OR HEAD (CPT=70450)    Result Date: 1/18/2025  CONCLUSION:  1. Negative for depressed calvarial fracture, coup/contrecoup intraparenchymal contusion, intracranial hemorrhage, or further evidence of acute intracranial process by noncontrast CT technique.  2. Senescent changes of parenchymal volume loss with sequela of chronic microvascular ischemic disease.  3. There is large vessel atherosclerosis involving the anterior and posterior intracranial circulations.  4.  Lesser incidental findings as above.      Dictated by (CST): Guevara Wagner MD on 1/18/2025 at 10:19 AM     Finalized by (CST): Guevara Wagner MD on 1/18/2025 at 10:22 AM          XR CHEST AP PORTABLE  (CPT=71045)    Result Date: 1/18/2025  CONCLUSION:  Stable chest without radiographically evident acute intrathoracic process.    Dictated by (CST): Guevara Wagner MD on 1/18/2025 at 10:01 AM     Finalized by (CST): Guevara Wagner MD on 1/18/2025 at 10:02 AM               Assessment/Plan:       PIERCE (acute kidney injury) (HCC) from dehydration  H/o CKD 3  -Continue IV fluids  -Monitor urine output, creatinine, labs in the morning      Nausea and vomiting, unspecified vomiting type  -Unclear etiology, no sick contacts, per patient no suspicion of foodborne illness  -r/o gastritis/peptic ulcer disease  -IV PPI  -IV nausea as needed  -Abdominal x-ray r/o obstruction  -May need GI pending progress      Closed head injury, initial encounter  S/p mechanical fall  CT okay  H/o severe back pain with lumbar stenosis, associated with weakness, eval for elective surgery recently.   -PT/OT    Leukocytosis, likely reactive  -Cultures pending  -CBC in the morning    Others:  CAD, distal LAD stent on plavix,   CHF, mild to mod AR,   HTN (on Bystolic, Losartan, Nifedipine, Hydralazine),   HL,   -->follows at [facility]  H/o temporal arteritis on tapering doses of prednisone (about month ago decr to 5 mg),   CKD3, BPH,   hypothyroidism,   Retinopathy    DVT proph: SCD for now pending w/u         Chart reviewed, including current admission vitals, notes, labs and imaging  Pertinent past medical records reviewed  Labs ordered and medications adjusted as outlined above  Coordinate care with care team/consultants  Discussed with patient and family at bedside available results of tests, management plan as outlined above, and the need for hospitalization  D/w RN     MDM high  severe acute illness/or exacerbation of chronic illness posing a  threat to life, IV medication, requiring close monitoring in hospital.    TRACEY DUVALL MD  1/18/2025    **Certification      PHYSICIAN Certification of Need for Inpatient Hospitalization - Initial Certification    Patient will require inpatient services that will reasonably be expected to span two midnight's based on the clinical documentation in H+P.   Based on patients current state of illness, I anticipate that, after discharge, patient will require TBD.         [1]   Allergies  Allergen Reactions    Aspirin NAUSEA AND VOMITING    Clavulanic Acid HIVES    Losartan MYALGIA    Penicillins HIVES    Seafood ANAPHYLAXIS    Iodine (Topical) UNKNOWN    Pregabalin OTHER (SEE COMMENTS)    Seasonal

## 2025-01-18 NOTE — ED INITIAL ASSESSMENT (HPI)
Patient arrives via EMS for lower back/sacral pain radiating down legs s/p left giving out. Patient ambulates with a cane. Patient reports hitting head. No LOC.  Patient reports recently having cough, NV, and some diarrhea. Patient reports decreased oral intake due to vomiting. A/ox4

## 2025-01-18 NOTE — ED PROVIDER NOTES
Patient Seen in: [facility] Emergency Department      History     Chief Complaint   Patient presents with    Fall     Stated Complaint:     Subjective:   HPI      69-year-old male on Plavix presents with fall.  Patient states just prior to arrival was walking with his cane, states his left leg gave out and he fell.  Now complains of right-sided headache after hitting his head, right-sided chest pain.  He denies loss of consciousness.  States yesterday felt ill, was nauseated, vomiting.  Denies neck pain, back pain, shortness of breath, abdominal pain.  Chronic low back pain, unchanged today.    Objective:     Past Medical History:    PIERCE (acute kidney injury) (HCC)    Arthritis    CAD (coronary artery disease)    High cholesterol    Neuropathy    Retinal pigmentation    Spine fracture    L3, L4, L5.  Rods placed    Thyroid disease    Ulcer    Unspecified essential hypertension              Past Surgical History:   Procedure Laterality Date    Angioplasty (coronary)  2012    with stent placement x 1    Back surgery      Excis jt lining,prox i-p jt,each Right 10/14/2015    Procedure: ARTHROTOMY OF FINGER JOINT INTERPHALANGEAL;  Surgeon: Olvin Vences MD;  Location: [facility]    Excis jt lining,prox i-p jt,each Right 10/14/2015    Procedure: ARTHROTOMY OF FINGER JOINT INTERPHALANGEAL;  Surgeon: Olvin Vences MD;  Location: [facility]    Other surgical history  2000,2002, 2004, 2006    back surgery     Other surgical history  1982    \"ulcer surgery\"    Other surgical history Right 10/14/15    right arthrotomy, debridement, synovectomy of the joints w/biopsy right index and long finger MP joint    Patient documented not to have experienced any of the following events Right 10/14/2015    Procedure: ARTHROTOMY OF FINGER JOINT INTERPHALANGEAL;  Surgeon: Olvin Vences MD;  Location: [facility]    Patient with preoperative order for iv antibiotic surgical site infect Right 10/14/2015    Procedure:  ARTHROTOMY OF FINGER JOINT INTERPHALANGEAL;  Surgeon: Olvin Vences MD;  Location: [facility]                Social History     Socioeconomic History    Marital status:    Tobacco Use    Smoking status: Never    Smokeless tobacco: Never   Vaping Use    Vaping status: Never Used   Substance and Sexual Activity    Alcohol use: No    Drug use: No     Social Drivers of Health     Food Insecurity: No Food Insecurity (3/9/2024)    Food Insecurity     Food Insecurity: Never true   Transportation Needs: No Transportation Needs (3/9/2024)    Transportation Needs     Lack of Transportation: No    [facility]    Social Connections   Housing Stability: Low Risk  (3/9/2024)    Housing Stability     Housing Instability: No                  Physical Exam     ED Triage Vitals [01/18/25 0902]   /54   Pulse 78   Resp 20   Temp 98.4 °F (36.9 °C)   Temp src Oral   SpO2 98 %   O2 Device None (Room air)       Current Vitals:   Vital Signs  BP: 140/51  Pulse: 75  Resp: (!) 29  Temp: 98.4 °F (36.9 °C)  Temp src: Oral  MAP (mmHg): 76    Oxygen Therapy  SpO2: 97 %  O2 Device: None (Room air)        Physical Exam  Vital signs reviewed. Nursing note reviewed.  Constitutional: Well-developed, Well-nourished. No emotional distress.  HENT: Small right parietal hematoma. No pain with palpation of the bony orbits. No midface deformity. No malocclusion. No nasal septal hematoma. OP clear and moist. Dentition intact.  EYES: PERRL. EOMI.  NECK: No C-spine tenderness or step-off. Trachea midline. No stridor.  CARDIAC: Normal rate. Normal S1/S2. 2+ distal pulses. No edema.  CHEST: No crepitus. + right anterior/upper chest wall tenderness  PULM: Effort normal. Breath sounds equal bilaterally.   ABD: Soft, Non Tender, Non Distended, normal BS. No guarding, no rebound.   RECTAL: No perianal numbness  BACK: No spinous process tenderness or step-off.  PELVIS: Pelvis  stable.  MS: Full ROM X4. No tenderness.   NEURO: Alert. Following commands. CN II-XII intact. 5/5 strength in the upper and lower extremities.   SKIN: Warm and dry. No rash or lesions.  PSYCH: Normal judgment. Normal affect.        ED Course     Labs Reviewed   COMP METABOLIC PANEL (14) - Abnormal; Notable for the following components:       Result Value    Sodium 146 (*)     Chloride 114 (*)     CO2 15.0 (*)     BUN 30 (*)     Creatinine 2.30 (*)     Calculated Osmolality 308 (*)     eGFR-Cr 30 (*)     Alkaline Phosphatase 43 (*)     All other components within normal limits   CBC WITH DIFFERENTIAL WITH PLATELET - Abnormal; Notable for the following components:    WBC 13.2 (*)     HGB 10.9 (*)     HCT 35.0 (*)     MCV 78.1 (*)     MCH 24.3 (*)     RDW-SD 49.7 (*)     RDW 17.5 (*)     Neutrophil Absolute Prelim 10.25 (*)     Neutrophil Absolute 10.25 (*)     All other components within normal limits   RAINBOW DRAW LAVENDER   RAINBOW DRAW LIGHT GREEN   RAINBOW DRAW BLUE                   MDM      Assessment:Patient is a 69 year old male presenting to the ED due to fall, headache, vomiting, right sided chest pain.    Comorbidities/chronic illnesses impacting care: on plavix, CAD    History obtained from: patient    External records and previous hospitalization records reviewed and documented below    Consideration of Social Determinants of Health and Impact on Medical Decision Making:  Housing/Transportation/Financial Strain/Access to healthcare/Food insecurity/family or Community support/Language and Literacy/Substance abuse/Mental health concerns/Disabilities     -none    Radiography/Imaging:  CT BRAIN OR HEAD (CPT=70450)   Final Result   PROCEDURE: CT BRAIN OR HEAD (CPT=70450)       COMPARISON: [facility], CT BRAIN OR HEAD (CPT=70450),    2/25/2024, 9:58 AM.  [facility], CT BRAIN OR HEAD    (CPT=70450), 9/23/2021, 12:21 PM.       INDICATIONS: Fall with traumatic head injury.        TECHNIQUE: CT images were obtained without contrast material. Automated    exposure control for dose reduction was used. Dose information was    transmitted to the ACR (American College of Radiology) NRDR (National    Radiology Data Registry), which includes the    Dose Index Registry.         FINDINGS:    CSF SPACES: Normal-variant persistent cavum septum pellucidum is    appreciated. The ventricles, cisterns, and sulci are dilated, but remain    commensurate in caliber, consistent with parenchymal volume loss of a    degree that is appropriate for age. No    hydrocephalus, subarachnoid hemorrhage, or effacement of the basal    cisterns is appreciated. There is no extra-axial fluid collection.    CEREBRUM: No acute intraparenchymal hemorrhage, edema, or cortical sulcal    effacement is apparent. There is no space-occupying lesion, mass effect,    or shift of midline structures. The gray-white matter junction is    preserved and bilaterally symmetric in    appearance.    Scattered periventricular and subcortical white matter hypodensities are    present, consistent with chronic microvascular ischemic disease.   CEREBELLUM: No edema, hemorrhage, mass, or acute infarction is seen. There    is cerebellar folial expansion consistent with atrophy.     BRAINSTEM: Patchy areas of low attenuation likely reflect sequela of    chronic small vessel ischemic disease. No edema, hemorrhage, mass, or    acute infarction is seen. There is commensurate atrophy.     CALVARIUM: There is no apparent depressed fracture, mass, or other    significant visible lesion.     SINUSES: Limited views demonstrate minimal mucosal thickening of the left    frontal sinus. There is scattered mucosal thickening of the ethmoid air    cells. Aerated secretions of the right sphenoid sinus are appreciated.     ORBITS: Limited views are notable for postoperative changes of the lenses    bilaterally.       OTHER: Atherosclerotic vascular calcifications  are perceived in the    cavernous carotid and distal vertebral arteries. Extensive dental amalgam    is seen on the  view.                                 =====   CONCLUSION:    1. Negative for depressed calvarial fracture, coup/contrecoup    intraparenchymal contusion, intracranial hemorrhage, or further evidence    of acute intracranial process by noncontrast CT technique.       2. Senescent changes of parenchymal volume loss with sequela of chronic    microvascular ischemic disease.        3. There is large vessel atherosclerosis involving the anterior and    posterior intracranial circulations.       4. Lesser incidental findings as above.                  Dictated by (CST): Guevara Wagner MD on 1/18/2025 at 10:19 AM        Finalized by (CST): Guevara Wagner MD on 1/18/2025 at 10:22 AM               XR CHEST AP PORTABLE  (CPT=71045)   Final Result   PROCEDURE: XR CHEST AP PORTABLE  (CPT=71045)   TIME: 0950.         COMPARISON: CT CHEST PE AORTA (IV ONLY) (CPT=71260), 11/15/2021, 3:20 PM.     [facility], XR CHEST AP PORTABLE (CPT=71045), 10/09/2021,    10:10 AM.  [facility], XR CHEST AP PORTABLE (CPT=71010),    7/25/2017, 12:13 PM.       INDICATIONS: Fall with posttraumatic chest wall pain.       TECHNIQUE:   Single view.         FINDINGS:    CARDIAC/VASC: The cardiomediastinal silhouette is not enlarged. There is    mild tortuosity of the thoracic aorta with peripheral atherosclerotic    vascular calcification. The pulmonary vascularity is within normal limits.       MEDIAST/NATALIE: No visible mass or adenopathy.    LUNGS/PLEURA: No airspace consolidation, pleural effusion, or pneumothorax    is evident.     BONES: Mild degenerative changes of the thoracic spine are apparent. There    are degenerative changes of the shoulders bilaterally.   OTHER: Surgical clips project in the epigastric region. Leads overlie the    chest and obscure underlying detail.                           =====   CONCLUSION:    Stable chest without radiographically evident acute intrathoracic process.               Dictated by (CST): Guevara Wagner MD on 1/18/2025 at 10:01 AM        Finalized by (CST): Guevara Wagner MD on 1/18/2025 at 10:02 AM                       ED course  Patient arrives via EMS with vital signs normal.  States his left leg \"gave out\" just prior to arrival, causing a ground-level fall striking his head.  Now has right-sided chest pain, right parietal hematoma on exam.  Has a good strong cough on exam, clear breath sounds, no abdominal tenderness, no spine tenderness, full range of motion of both hips, no deformity of his extremities.  Will CT head due to fall on Plavix, chest x-ray, basic labs, give IV fluid.  He received Zofran en route via EMS, sounds like he had viral type enteritis illness yesterday.    Laboratory results above were independently viewed and interpreted as: Mild leukocytosis, worsening renal function consistent with PIERCE, mild acidosis likely from renal dysfunction versus GI losses.  Radiology: ordered and independently interpreted as: Chest x-ray negative for infiltrate.  CT head negative for intracranial bleed or mass.    With patient's PIERCE secondary to likely GI losses, prerenal etiology, will plan to admit for rehydration            Medications   sodium chloride 0.9 % IV bolus 1,000 mL (1,000 mL Intravenous New Bag 1/18/25 0919)   morphINE PF 4 MG/ML injection 4 mg (4 mg Intravenous Given 1/18/25 1031)             Admission disposition: 1/18/2025 10:22 AM           Medical Decision Making      Disposition and Plan     Clinical Impression:  1. PIERCE (acute kidney injury) (HCC)    2. Nausea and vomiting, unspecified vomiting type    3. Closed head injury, initial encounter         Disposition:  Admit  1/18/2025 10:22 am    Follow-up:  No follow-up provider specified.  We recommend that you schedule follow up care with a primary care provider within the next three months to  obtain basic health screening including reassessment of your blood pressure.      Medications Prescribed:  Current Discharge Medication List              Supplementary Documentation:         Hospital Problems       Present on Admission  Date Reviewed: 5/2/2017            ICD-10-CM Noted POA    * (Principal) PIERCE (acute kidney injury) (HCC) N17.9 9/23/2021 Unknown

## 2025-01-19 ENCOUNTER — APPOINTMENT (OUTPATIENT)
Dept: GENERAL RADIOLOGY | Facility: HOSPITAL | Age: 70
End: 2025-01-19
Attending: HOSPITALIST
Payer: MEDICARE

## 2025-01-19 ENCOUNTER — APPOINTMENT (OUTPATIENT)
Dept: CT IMAGING | Facility: HOSPITAL | Age: 70
End: 2025-01-19
Attending: HOSPITALIST
Payer: MEDICARE

## 2025-01-19 LAB
ALBUMIN SERPL-MCNC: 3.5 G/DL (ref 3.2–4.8)
ANION GAP SERPL CALC-SCNC: 14 MMOL/L (ref 0–18)
BASOPHILS # BLD AUTO: 0.05 X10(3) UL (ref 0–0.2)
BASOPHILS NFR BLD AUTO: 0.5 %
BUN BLD-MCNC: 22 MG/DL (ref 9–23)
BUN/CREAT SERPL: 15.5 (ref 10–20)
CALCIUM BLD-MCNC: 8.1 MG/DL (ref 8.7–10.4)
CHLORIDE SERPL-SCNC: 118 MMOL/L (ref 98–112)
CO2 SERPL-SCNC: 17 MMOL/L (ref 21–32)
CREAT BLD-MCNC: 1.42 MG/DL
DEPRECATED RDW RBC AUTO: 50.4 FL (ref 35.1–46.3)
EGFRCR SERPLBLD CKD-EPI 2021: 54 ML/MIN/1.73M2 (ref 60–?)
EOSINOPHIL # BLD AUTO: 0.07 X10(3) UL (ref 0–0.7)
EOSINOPHIL NFR BLD AUTO: 0.7 %
ERYTHROCYTE [DISTWIDTH] IN BLOOD BY AUTOMATED COUNT: 17.4 % (ref 11–15)
GLUCOSE BLD-MCNC: 59 MG/DL (ref 70–99)
GLUCOSE BLDC GLUCOMTR-MCNC: 69 MG/DL (ref 70–99)
HCT VFR BLD AUTO: 32.7 %
HGB BLD-MCNC: 9.9 G/DL
IMM GRANULOCYTES # BLD AUTO: 0.14 X10(3) UL (ref 0–1)
IMM GRANULOCYTES NFR BLD: 1.4 %
LYMPHOCYTES # BLD AUTO: 1.74 X10(3) UL (ref 1–4)
LYMPHOCYTES NFR BLD AUTO: 17 %
MCH RBC QN AUTO: 24.3 PG (ref 26–34)
MCHC RBC AUTO-ENTMCNC: 30.3 G/DL (ref 31–37)
MCV RBC AUTO: 80.3 FL
MONOCYTES # BLD AUTO: 0.87 X10(3) UL (ref 0.1–1)
MONOCYTES NFR BLD AUTO: 8.5 %
NEUTROPHILS # BLD AUTO: 7.36 X10 (3) UL (ref 1.5–7.7)
NEUTROPHILS # BLD AUTO: 7.36 X10(3) UL (ref 1.5–7.7)
NEUTROPHILS NFR BLD AUTO: 71.9 %
OSMOLALITY SERPL CALC.SUM OF ELEC: 309 MOSM/KG (ref 275–295)
PHOSPHATE SERPL-MCNC: 2.5 MG/DL (ref 2.4–5.1)
PLATELET # BLD AUTO: 228 10(3)UL (ref 150–450)
POTASSIUM SERPL-SCNC: 4.1 MMOL/L (ref 3.5–5.1)
RBC # BLD AUTO: 4.07 X10(6)UL
SODIUM SERPL-SCNC: 149 MMOL/L (ref 136–145)
STREP PNEUMO ANTIGEN, URINE: NEGATIVE
WBC # BLD AUTO: 10.2 X10(3) UL (ref 4–11)

## 2025-01-19 PROCEDURE — 74018 RADEX ABDOMEN 1 VIEW: CPT | Performed by: HOSPITALIST

## 2025-01-19 PROCEDURE — 74176 CT ABD & PELVIS W/O CONTRAST: CPT | Performed by: HOSPITALIST

## 2025-01-19 PROCEDURE — 99233 SBSQ HOSP IP/OBS HIGH 50: CPT | Performed by: HOSPITALIST

## 2025-01-19 RX ORDER — PREDNISONE 5 MG/1
5 TABLET ORAL ONCE
Status: COMPLETED | OUTPATIENT
Start: 2025-01-19 | End: 2025-01-19

## 2025-01-19 RX ORDER — METOCLOPRAMIDE HYDROCHLORIDE 5 MG/ML
10 INJECTION INTRAMUSCULAR; INTRAVENOUS 3 TIMES DAILY PRN
Status: DISCONTINUED | OUTPATIENT
Start: 2025-01-19 | End: 2025-01-22

## 2025-01-19 RX ORDER — DEXTROSE MONOHYDRATE AND SODIUM CHLORIDE 5; .45 G/100ML; G/100ML
INJECTION, SOLUTION INTRAVENOUS CONTINUOUS
Status: DISCONTINUED | OUTPATIENT
Start: 2025-01-19 | End: 2025-01-21

## 2025-01-19 NOTE — PROGRESS NOTES
[facility]  part of [facility]    Progress Note    Maximo Diggs Jr. Patient Status:  Inpatient    1955 MRN [id number]   Location [facility] 4W/SW/SE Attending Viri Foote MD   Hosp Day # 1 PCP Lito Pedroza MD     Chief complaint: fall  Subjective:     Not feeling well, weak, nausea, no vomiting, kept italian ice  No high fevers  BP ok  C/o chronic cough, chronic back pain, especially tail bone  Abd sore today    Wife at bedside, dtr on the phone    Objective:   Blood pressure 156/51, pulse 83, temperature 99.2 °F (37.3 °C), temperature source Oral, resp. rate 18, height 5' 5\" (1.651 m), weight 121 lb (54.9 kg), SpO2 97%.    GEN: NAD, but looks unwell  HEENT: conjunctivae/corneas clear. PERRL, EOM's intact. +moon face from steroids  Neck: no adenopathy, no carotid bruit, no JVD, supple  Pulmonary:  clear to auscultation bilaterally  Cardiovascular: S1, S2 normal, no murmur, click, rub or gallop, regular rate and rhythm  Abdominal: soft,  tender today; bowel sounds normal; no masses,   Extremities: no cyanosis or edema  Pulses: palpable and symmetric  Skin: No rashes or lesions  Neurologic: Alert and oriented X 3, generalized weakness  Psychiatric: calm, cooperative    Assessment and Plan:   PIERCE (acute kidney injury) (HCC) from dehydration  H/o CKD 3  -Continue IV fluids, switch to D5.45 b/c hypernatremia, Cr is improving  -Monitor urine output, creatinine, labs in the morning       Nausea and vomiting, unspecified vomiting type    Abd pains  -Unclear etiology, no sick contacts, per patient no suspicion of foodborne illness  -r/o gastritis/peptic ulcer disease  -IV PPI, incr to BID  -IV nausea as needed  -Abdominal x-ray r/o obstruction ok  -FT, lipase wnl  -wife thinks it's all related to tapering steroids (1 week ago went from 10-->5 mg)  -check CT abd (with oral contrast if able to tolerate, no IV)  -May need GI pending progress, pt somewhat reluctant        Closed head injury, initial encounter  S/p mechanical fall  CT okay  H/o severe back pain with lumbar stenosis, associated with weakness, eval for elective surgery recently.   -PT/OT     Leukocytosis, likely reactive  -Cultures pending  -CBC in the morning better     Others:  CAD, distal LAD stent on plavix,   CHF, mild to mod AR,   HTN (on Bystolic, Losartan, Nifedipine, Hydralazine),   HL,   -->follows at [facility]  H/o temporal arteritis on tapering doses of prednisone ( dose decr to 5 mg recently),   CKD3, BPH,   hypothyroidism,   Retinopathy    Chronic cough (\"from steroids\")  Chronic back pains     DVT proph: SCD for now pending w/u       Dispo: from home with wife, follows usually at [facility]        Supplementary Documentation:   DVT Mechanical Prophylaxis:        DVT Pharmacologic Prophylaxis   Medication   None                Code Status: Full Code  Grant: No urinary catheter in place  Grant Duration (in days):   Central line:    ANKIT: 1/20/2025                        Chart reviewed, including current vitals, notes, labs and imaging  Pertinent past medical records reviewed  Labs ordered and medications adjusted as outlined above  Home medications reconciliation completed  Coordinate care with care team/consultants  Discussed with patient and family at bedside results of tests, management plan as outlined above, and the need for ongoing hospitalization  D/w RN     MDM high  severe acute illness/or exacerbation of chronic illness posing a threat to life, IV medications, requiring close monitoring in hospital.       Results:     Lab Results   Component Value Date    WBC 10.2 01/19/2025    HGB 9.9 (L) 01/19/2025    HCT 32.7 (L) 01/19/2025    .0 01/19/2025    CREATSERUM 1.42 (H) 01/19/2025    BUN 22 01/19/2025     (H) 01/19/2025    K 4.1 01/19/2025     (H) 01/19/2025    CO2 17.0 (L) 01/19/2025    GLU 59 (L) 01/19/2025    CA 8.1 (L) 01/19/2025    ALB 3.5 01/19/2025    ALKPHO 43 (L) 01/18/2025     BILT 0.3 01/18/2025    TP 6.5 01/18/2025    AST 30 01/18/2025    ALT 18 01/18/2025    TSH 1.606 02/25/2024    LIP 44 01/18/2025    DDIMER 0.65 (H) 11/15/2021    ESRML 53 (H) 09/23/2021    CRP 0.50 (H) 09/23/2021    MG 2.2 03/10/2024    PHOS 2.5 01/19/2025    TROP <0.045 11/15/2021     02/25/2024    B12 527 02/08/2017       XR ABDOMEN (1 VIEW) (CPT=74018)    Result Date: 1/19/2025  CONCLUSION:   Nonobstructive bowel gas pattern.  Small amount of air and stool within the colon.     Dictated by (CST): Agata Rapp MD on 1/19/2025 at 1:23 PM     Finalized by (CST): Agata Rapp MD on 1/19/2025 at 1:26 PM          XR CHEST AP PORTABLE  (CPT=71045)    Result Date: 1/18/2025  CONCLUSION:  Stable chest without radiographically evident acute intrathoracic process.    elm-remote.   Dictated by (CST): Guevara Wagner MD on 1/18/2025 at 11:50 PM     Finalized by (CST): Guevara Wagner MD on 1/18/2025 at 11:51 PM          CT BRAIN OR HEAD (CPT=70450)    Result Date: 1/18/2025  CONCLUSION:  1. Negative for depressed calvarial fracture, coup/contrecoup intraparenchymal contusion, intracranial hemorrhage, or further evidence of acute intracranial process by noncontrast CT technique.  2. Senescent changes of parenchymal volume loss with sequela of chronic microvascular ischemic disease.  3. There is large vessel atherosclerosis involving the anterior and posterior intracranial circulations.  4. Lesser incidental findings as above.      Dictated by (CST): Guevara Wagner MD on 1/18/2025 at 10:19 AM     Finalized by (CST): Guevara Wagner MD on 1/18/2025 at 10:22 AM          XR CHEST AP PORTABLE  (CPT=71045)    Result Date: 1/18/2025  CONCLUSION:  Stable chest without radiographically evident acute intrathoracic process.    Dictated by (CST): Guevara Wagner MD on 1/18/2025 at 10:01 AM     Finalized by (CST): Guevara Wagner MD on 1/18/2025 at 10:02 AM                 TRACEY DUVALL MD  1/19/2025

## 2025-01-19 NOTE — PLAN OF CARE
A/O x 4. Zofran and reglan for nausea. IVF infusing. Clear liquid diet. Abdominal XR and CT abd completed this shift. Voiding freely. Awaiting stool sample for GI stool panel and cdiff r/o. Declining  need for pain medication. Family at bedside updated on plan of care. Bed in lowest position, call light in reach, frequent rounding, nonskid footwear, fall precautions in place.

## 2025-01-19 NOTE — PLAN OF CARE
Alert and oriented x4 on room air. Rested for majority of the night. Urinal at bedside. Resp panel collected and resulted, negative. Pt aware of PRN norco. Nausea improved. Call light within reach.    Problem: Patient Centered Care  Goal: Patient preferences are identified and integrated in the patient's plan of care  Description: Interventions:  - What would you like us to know as we care for you?   - Provide timely, complete, and accurate information to patient/family  - Incorporate patient and family knowledge, values, beliefs, and cultural backgrounds into the planning and delivery of care  - Encourage patient/family to participate in care and decision-making at the level they choose  - Honor patient and family perspectives and choices  Outcome: Progressing     Problem: Patient/Family Goals  Goal: Patient/Family Long Term Goal  Description: Patient's Long Term Goal: to be discharged home    Interventions:  - monitor labs and vitals   - pain medications   - update pt and family on plan of care   - follow md orders   - See additional Care Plan goals for specific interventions  Outcome: Progressing  Goal: Patient/Family Short Term Goal  Description: Patient's Short Term Goal: to have less pain    Interventions:   - pain medications   - See additional Care Plan goals for specific interventions  Outcome: Progressing     Problem: PAIN - ADULT  Goal: Verbalizes/displays adequate comfort level or patient's stated pain goal  Description: INTERVENTIONS:  - Encourage pt to monitor pain and request assistance  - Assess pain using appropriate pain scale  - Administer analgesics based on type and severity of pain and evaluate response  - Implement non-pharmacological measures as appropriate and evaluate response  - Consider cultural and social influences on pain and pain management  - Manage/alleviate anxiety  - Utilize distraction and/or relaxation techniques  - Monitor for opioid side effects  - Notify MD/LIP if  interventions unsuccessful or patient reports new pain  - Anticipate increased pain with activity and pre-medicate as appropriate  Outcome: Progressing     Problem: RISK FOR INFECTION - ADULT  Goal: Absence of fever/infection during anticipated neutropenic period  Description: INTERVENTIONS  - Monitor WBC  - Administer growth factors as ordered  - Implement neutropenic guidelines  Outcome: Progressing     Problem: SAFETY ADULT - FALL  Goal: Free from fall injury  Description: INTERVENTIONS:  - Assess pt frequently for physical needs  - Identify cognitive and physical deficits and behaviors that affect risk of falls.  - [place] fall precautions as indicated by assessment.  - Educate pt/family on patient safety including physical limitations  - Instruct pt to call for assistance with activity based on assessment  - Modify environment to reduce risk of injury  - Provide assistive devices as appropriate  - Consider OT/PT consult to assist with strengthening/mobility  - Encourage toileting schedule  Outcome: Progressing     Problem: DISCHARGE PLANNING  Goal: Discharge to home or other facility with appropriate resources  Description: INTERVENTIONS:  - Identify barriers to discharge w/pt and caregiver  - Include patient/family/discharge partner in discharge planning  - Arrange for needed discharge resources and transportation as appropriate  - Identify discharge learning needs (meds, wound care, etc)  - Arrange for interpreters to assist at discharge as needed  - Consider post-discharge preferences of patient/family/discharge partner  - Complete POLST form as appropriate  - Assess patient's ability to be responsible for managing their own health  - Refer to Case Management Department for coordinating discharge planning if the patient needs post-hospital services based on physician/LIP order or complex needs related to functional status, cognitive ability or social support system  Outcome: Progressing

## 2025-01-19 NOTE — PHYSICAL THERAPY NOTE
Physical Therapy Contact Note    Orders received, chart reviewed. Attempted to see patient for Physical Therapy services, patient declining participation in PT at this time due to fatigue. RN informed. Will re-schedule visit.    Kristi Patrick, PT, DPT

## 2025-01-20 LAB
ALBUMIN SERPL-MCNC: 3.6 G/DL (ref 3.2–4.8)
ANION GAP SERPL CALC-SCNC: 10 MMOL/L (ref 0–18)
BASOPHILS # BLD AUTO: 0.03 X10(3) UL (ref 0–0.2)
BASOPHILS NFR BLD AUTO: 0.3 %
BUN BLD-MCNC: 12 MG/DL (ref 9–23)
BUN/CREAT SERPL: 10.7 (ref 10–20)
CALCIUM BLD-MCNC: 8.1 MG/DL (ref 8.7–10.4)
CHLORIDE SERPL-SCNC: 115 MMOL/L (ref 98–112)
CO2 SERPL-SCNC: 20 MMOL/L (ref 21–32)
CORTIS SERPL-MCNC: 11.8 UG/DL
CREAT BLD-MCNC: 1.12 MG/DL
DEPRECATED RDW RBC AUTO: 51.1 FL (ref 35.1–46.3)
EGFRCR SERPLBLD CKD-EPI 2021: 72 ML/MIN/1.73M2 (ref 60–?)
EOSINOPHIL # BLD AUTO: 0.02 X10(3) UL (ref 0–0.7)
EOSINOPHIL NFR BLD AUTO: 0.2 %
ERYTHROCYTE [DISTWIDTH] IN BLOOD BY AUTOMATED COUNT: 17.2 % (ref 11–15)
GLUCOSE BLD-MCNC: 149 MG/DL (ref 70–99)
HCT VFR BLD AUTO: 33.1 %
HGB BLD-MCNC: 9.9 G/DL
IMM GRANULOCYTES # BLD AUTO: 0.08 X10(3) UL (ref 0–1)
IMM GRANULOCYTES NFR BLD: 0.8 %
LYMPHOCYTES # BLD AUTO: 1.39 X10(3) UL (ref 1–4)
LYMPHOCYTES NFR BLD AUTO: 14 %
MAGNESIUM SERPL-MCNC: 1.8 MG/DL (ref 1.6–2.6)
MCH RBC QN AUTO: 24.2 PG (ref 26–34)
MCHC RBC AUTO-ENTMCNC: 29.9 G/DL (ref 31–37)
MCV RBC AUTO: 80.9 FL
MONOCYTES # BLD AUTO: 0.61 X10(3) UL (ref 0.1–1)
MONOCYTES NFR BLD AUTO: 6.1 %
NEUTROPHILS # BLD AUTO: 7.81 X10 (3) UL (ref 1.5–7.7)
NEUTROPHILS # BLD AUTO: 7.81 X10(3) UL (ref 1.5–7.7)
NEUTROPHILS NFR BLD AUTO: 78.6 %
OSMOLALITY SERPL CALC.SUM OF ELEC: 303 MOSM/KG (ref 275–295)
PHOSPHATE SERPL-MCNC: 1.4 MG/DL (ref 2.4–5.1)
PLATELET # BLD AUTO: 223 10(3)UL (ref 150–450)
POTASSIUM SERPL-SCNC: 3.7 MMOL/L (ref 3.5–5.1)
RBC # BLD AUTO: 4.09 X10(6)UL
SODIUM SERPL-SCNC: 145 MMOL/L (ref 136–145)
WBC # BLD AUTO: 9.9 X10(3) UL (ref 4–11)

## 2025-01-20 PROCEDURE — 99233 SBSQ HOSP IP/OBS HIGH 50: CPT | Performed by: HOSPITALIST

## 2025-01-20 RX ORDER — HYDRALAZINE HYDROCHLORIDE 20 MG/ML
10 INJECTION INTRAMUSCULAR; INTRAVENOUS EVERY 6 HOURS PRN
Status: DISCONTINUED | OUTPATIENT
Start: 2025-01-20 | End: 2025-01-22

## 2025-01-20 NOTE — DIETARY NOTE
ADULT NUTRITION INITIAL ASSESSMENT    Pt is at moderate nutrition risk.  Pt meets moderate malnutrition criteria.      CRITERIA FOR MALNUTRITION DIAGNOSIS:  Criteria for non-severe malnutrition diagnosis: chronic illness related to wt loss 7.5% in 3 months and energy intake less than75% for greater than 1 month.    RECOMMENDATIONS TO MD: See nutrition intervention for ONS (oral nutrition supplements)    ADMITTING DIAGNOSIS:  PIERCE (acute kidney injury) ([facility]) [N17.9]  Closed head injury, initial encounter [S09.90XA]  Nausea and vomiting, unspecified vomiting type [R11.2]  PERTINENT PAST MEDICAL HISTORY:   Past Medical History:    PIERCE (acute kidney injury) ([facility])    Arthritis    CAD (coronary artery disease)    High cholesterol    Nausea and vomiting, unspecified vomiting type    Neuropathy    Retinal pigmentation    Spine fracture    L3, L4, L5.  Rods placed    Thyroid disease    Ulcer    Unspecified essential hypertension       PATIENT STATUS: Initial 01/20/25: Pt admit for fall. PMH sig for CAD, CHF, CKD3, severe back pain with lumbar stenosis. Pt assessed due to screening at risk for weight loss d/t poor appetite. Pt visited after breakfast time. Pt stated he had a few bites of breakfast but his appetite and intake has been poor recently d/t severe back pain. Pt usually eats ~2 small meals per day. Pt stated he has lost weight recently, last weighed himself a few months ago and was ~150 lbs. RD requested new weight reading, RN weighed pt using bed scale- 127 lbs, however unsure of accuracy of weight reading d/t pillows and bedding on bed. Discussed importance of adequate po intake to prevent further weight loss. Offered to order ONS for pt while here in hospital, pt agreeable to try. Pt states he has had Ensure before but experiences n/v afterwards. Offered MindQuiltgan protein shake, as it may be better tolerated. Pt agreeable to try. Will order for him BID. Answered all questions at this time. Will follow per  protocol.    FOOD/NUTRITION RELATED HISTORY:  Appetite: Fair  Intake:  eats 2 small meals per day PTA, 0% documented from breakfast this AM.  Intake Meeting Needs: No, but oral nutrition supplements (ONS) to maximize  Percent Meals Eaten (last 3 days)       Date/Time Percent Meals Eaten (%)    01/19/25 1851 100 %    01/20/25 1040 0 %           Food Allergies: Seafood  Cultural/Ethnic/Confucianism Preferences: None    GASTROINTESTINAL: early satiety    MEDICATIONS: reviewed   pantoprazole  40 mg Intravenous BID    predniSONE  7.5 mg Oral Daily    tamsulosin  0.4 mg Oral Daily @ 0700    metoprolol tartrate  50 mg Oral 2x Daily(Beta Blocker)    clopidogrel  75 mg Oral Daily    ezetimibe  10 mg Oral Daily    hydrALAZINE  100 mg Oral BID    levothyroxine  25 mcg Oral Daily @ 0700    NIFEdipine ER  60 mg Oral BID      dextrose 5%-sodium chloride 0.45% 75 mL/hr at 01/20/25 0146       LABS: Phosphorus 1.4  Recent Labs     01/18/25  0912 01/19/25  0457 01/20/25  0435   GLU 88 59* 149*   BUN 30* 22 12   CREATSERUM 2.30* 1.42* 1.12   CA 9.2 8.1* 8.1*   MG  --   --  1.8   * 149* 145   K 5.1 4.1 3.7   * 118* 115*   CO2 15.0* 17.0* 20.0*   PHOS  --  2.5 1.4*   OSMOCALC 308* 309* 303*       NUTRITION RELATED PHYSICAL FINDINGS:  - Nutrition Focused Physical Exam (NFPE): no wasting noted, malnutrition related to PO intake and weight loss per visual exam  - Fluid Accumulation: none  see RN documentation for details  - Skin Integrity: at risk see RN documentation for details    ANTHROPOMETRICS:  HT: 165.1 cm (5' 5\")  WT: 57.9 kg (127 lb 9.6 oz)   BMI: Body mass index is 21.23 kg/m².  BMI CLASSIFICATION: 18.5-24.9 kg/m2 - WNL  IBW/lbs (Calculated) Male: 136 lbs           93% IBW  Usual Body Wt: 150 lbs       85% UBW    WEIGHT HISTORY:  Patient Weight(s) for the past 336 hrs:   Weight   01/18/25 1148 54.9 kg (121 lb)   01/18/25 0902 59 kg (130 lb)     Wt Readings from Last 10 Encounters:   01/18/25 54.9 kg (121 lb)   10/24/24  67.1 kg (148 lb)   03/09/24 65.9 kg (145 lb 4.8 oz)   02/25/24 69.4 kg (153 lb)   02/25/24 69.4 kg (153 lb)   11/15/21 72.6 kg (160 lb)   10/11/21 75.3 kg (166 lb)   10/09/21 75.3 kg (166 lb)   09/24/21 71 kg (156 lb 8 oz)   09/15/17 63 kg (139 lb)     NUTRITION DIAGNOSIS/PROBLEM:   Moderate Malnutrition related to Chronic - Physiological causes resulting in anorexia or diminished intake  as evidenced by wt loss 14% in 3 months and energy intake less than 75% for greater than 1 month.    NUTRITION INTERVENTION:     NUTRITION PRESCRIPTION:   Estimated Nutrition needs: --dosing wt of 58 kg - wt taken on 1/20  Calories: 1450 - 1740 calories/day (25-30 calories per kg Dosing wt)  Protein: 58 - 87 g protein/day (1.0 -1.5 g protein/kg Dosing wt)  Fluid Needs: 1 mL/kcal    - Diet:       Procedures    Regular/General diet Is Patient on Accuchecks? No      - RD Malnutrition Care Plan: Encouraged increased PO intake, Encouraged small frequent meals with emphasis on high calorie/high protein, and Initiated ONS (oral nutritional supplements)  - Meals and snacks: Encouraged small frequent meals and Encouraged adequate PO intake  - Medical Food Supplements-RD added [employer] (325 calories and 16 g protein each) BID Vanilla or Chocolate. Rational/use of oral supplements discussed.  - Vitamin and mineral supplements: none  - Feeding assistance: independent  - Nutrition education: Discussed importance of adequate energy and protein intake     - Coordination of nutrition care: request current weight  - Discharge and transfer of nutrition care to new setting or provider: to be determined    MONITOR AND EVALUATE/NUTRITION GOALS:  - Food and Nutrient Intake:      Monitor: adequacy of PO intake and adequacy of supplement intake  - Food and Nutrient Administration:      Monitor: N/A  - Anthropometric Measurement:    Monitor weight  - Nutrition Goals:      halt wt loss and PO and supplement greater than 75% of needs    DIETITIAN FOLLOW  UP: RD to follow and monitor nutrition status    Vanita Kaminski, MS, RDN, LDN  Clinical Dietitian

## 2025-01-20 NOTE — OCCUPATIONAL THERAPY NOTE
OCCUPATIONAL THERAPY EVALUATION - INPATIENT     Room Number: 401/401-A  Evaluation Date: 1/20/2025  Type of Evaluation: Initial   Presenting Problem: fall, low back pain, PIERCE, nausea, vomiting   Co-Morbidities: CAD, distal LAD stent on plavix, CHF, mild to mod AR, HTN, HL, temporal arteritis, CKD3, BPH, hypothyroidism, retinopathy, arthritis, severe back pain with lumbar stenosis     Physician Order: IP Consult to Occupational Therapy  Reason for Therapy: ADL/IADL Dysfunction and Discharge Planning    OCCUPATIONAL THERAPY ASSESSMENT   Patient is a 69 year old male admitted 1/18/2025 for fall, low back pain, PIERCE, nausea, vomiting.  Prior to admission, patient's baseline is independent with ADLs with PRN assist from spouse.  Patient is currently functioning below baseline with ADLs, bed mobility, functional transfers, and functional mobility.  Patient is requiring minimal assist, moderate assist, and set-up assist  as a result of the following impairments: decreased functional strength, pain, impaired dynamic standing balance, decreased muscular endurance, and medical status. Occupational Therapy will continue to follow for duration of hospitalization.    Patient will benefit from continued skilled OT Services to promote return to prior level of function and safety with continuous assistance and gradual rehabilitative therapy versus return home with HHOT pending pain management.     PLAN DURING HOSPITALIZATION  OT Device Recommendations: TBD  OT Treatment Plan: Balance activities;ADL training;Functional transfer training;Patient/Family education;Patient/Family training;Compensatory technique education     OCCUPATIONAL THERAPY MEDICAL/SOCIAL HISTORY   Problem List  Principal Problem:    PIERCE (acute kidney injury) (HCC)  Active Problems:    Nausea and vomiting, unspecified vomiting type    Closed head injury, initial encounter    HOME SITUATION  Type of Home: House  Home Layout: Two level; Bed/bath upstairs  Lives With:  Spouse  Toilet and Equipment: Comfort height toilet  Shower/Tub and Equipment: Walk-in shower; Other (Comment) (built in bench)  Other Equipment: None  Patient Regularly Uses: Rolling walker (owns cane)    Prior Level of [place]: Prior to admission pt reports being independent with ADLs. Pt lives at home with his wife who is there to provide supervision assist and set-up assist for tasks due to the patient's visual impairment.     SUBJECTIVE  \"I have no peripheral vision.\"     OCCUPATIONAL THERAPY EXAMINATION      OBJECTIVE  Precautions: Bed/chair alarm; Low vision  Fall Risk: -- (Moderate fall risk)      PAIN ASSESSMENT  Rating: Unable to rate  Location: low back pain  Management Techniques: Activity promotion; Relaxation; Repositioning; Heat    COGNITION  Overall Cognitive Status:  WFL - within functional limits    VISION  Patient Visual Report:  Pt reports having no peripheral vision and very limited central vision at baseline.     RANGE OF MOTION   Upper extremity ROM is within functional limits     STRENGTH ASSESSMENT  Upper extremity strength is within functional limits     COORDINATION  Gross Motor: WFL   Fine Motor: WFL     ACTIVITIES OF DAILY LIVING ASSESSMENT  AM-PAC ‘6-Clicks’ Inpatient Daily Activity Short Form  How much help from another person does the patient currently need…  -   Putting on and taking off regular lower body clothing?: A Lot  -   Bathing (including washing, rinsing, drying)?: A Lot  -   Toileting, which includes using toilet, bedpan or urinal? : A Lot  -   Putting on and taking off regular upper body clothing?: A Little  -   Taking care of personal grooming such as brushing teeth?: A Little  -   Eating meals?: A Little    AM-PAC Score:  Score: 15  Approx Degree of Impairment: 56.46%  Standardized Score (AM-PAC Scale): 34.69  CMS Modifier (G-Code): CK    FUNCTIONAL TRANSFER ASSESSMENT  Sit to Stand: Edge of Bed  Edge of Bed: Minimal Assist  Simulated commode transfer: Min assist  (stand pivot at RW level)     BED MOBILITY  Supine to Sit : Moderate Assist  Comments: Assist to manage BLE to EOB and rotate hips.     BALANCE ASSESSMENT  Static Sitting: Stand-by Assist  Static Standing: Minimal Assist  Dynamic sitting: SBA  Dynamic Standing: Min assist      FUNCTIONAL ADL ASSESSMENT  Eating: -- (set-up assist)       Skilled Therapy Provided:   RN cleared pt for participation. Session coordinated with PT. Pt received in bed with no visitors present. Pt agreeable to participation in therapy. Pt benefited from increased time, verbal/tactile cues for hand placement/positioning during tasks, and RW for support. Pt overall limited this session by high pain levels in lower back with any movement. Pt with impaired vision benefiting from items being placed right in front of him and assist to open packages prior to beginning meal. Pt remained in chair with heat pack applied to low back and RN aware of findings at end of session.       EDUCATION PROVIDED  Patient Education : Role of Occupational Therapy; Plan of Care; Functional Transfer Techniques; Fall Prevention; Posture/Positioning; Proper Body Mechanics  Patient's Response to Education: Verbalized Understanding; Returned Demonstration    The patient's Approx Degree of Impairment: 56.46% has been calculated based on documentation in the [facility] '6 clicks' Inpatient Daily Activity Short Form.  Research supports that patients with this level of impairment may benefit from rehab.  Final disposition will be made by interdisciplinary medical team.     Patient End of Session: Up in chair;Needs met;Call light within reach;With  staff;RN aware of session/findings;All patient questions and concerns addressed;Hospital anti-slip socks    OT Goals  Patients self stated goal is: none stated      Patient will complete functional transfer with SUP  Comment:     Patient will complete toileting with SUP  Comment:     Patient will tolerate standing for 2 minutes in prep  for adls with SUP   Comment:    Patient will complete LB dressing with SUP  Comment:          Goals  on: 25  Frequency: 3x/week    Patient Evaluation Complexity Level:   Occupational Profile/Medical History MODERATE - Expanded review of history including review of medical or therapy record   Specific performance deficits impacting engagement in ADL/IADL MODERATE  3 - 5 performance deficits   Client Assessment/Performance Deficits HIGH - Comorbidities and significant modifications of tasks    Clinical Decision Making MODERATE - Analysis of occupational profile, detailed assessments, several treatment options    Overall Complexity MODERATE     OT Session Time: 15 minutes  Self-Care Home Management: 15 minutes

## 2025-01-20 NOTE — PLAN OF CARE
Alert and oriented x4 on room air. Denies N/V overnight. Advance diet as tolerated, advanced to fulls this AM. BP elevated, PRN hydralazine added. Still needs to have C diff/GI panel collected. Call light within reach.    Problem: Patient Centered Care  Goal: Patient preferences are identified and integrated in the patient's plan of care  Description: Interventions:  - What would you like us to know as we care for you?   - Provide timely, complete, and accurate information to patient/family  - Incorporate patient and family knowledge, values, beliefs, and cultural backgrounds into the planning and delivery of care  - Encourage patient/family to participate in care and decision-making at the level they choose  - Honor patient and family perspectives and choices  Outcome: Progressing     Problem: Patient/Family Goals  Goal: Patient/Family Long Term Goal  Description: Patient's Long Term Goal: to be discharged home    Interventions:  - monitor labs and vitals   - pain medications   - update pt and family on plan of care   - follow md orders   - See additional Care Plan goals for specific interventions  Outcome: Progressing  Goal: Patient/Family Short Term Goal  Description: Patient's Short Term Goal: to have less pain    Interventions:   - pain medications   - See additional Care Plan goals for specific interventions  Outcome: Progressing     Problem: PAIN - ADULT  Goal: Verbalizes/displays adequate comfort level or patient's stated pain goal  Description: INTERVENTIONS:  - Encourage pt to monitor pain and request assistance  - Assess pain using appropriate pain scale  - Administer analgesics based on type and severity of pain and evaluate response  - Implement non-pharmacological measures as appropriate and evaluate response  - Consider cultural and social influences on pain and pain management  - Manage/alleviate anxiety  - Utilize distraction and/or relaxation techniques  - Monitor for opioid side effects  - Notify  MD/LIP if interventions unsuccessful or patient reports new pain  - Anticipate increased pain with activity and pre-medicate as appropriate  Outcome: Progressing     Problem: RISK FOR INFECTION - ADULT  Goal: Absence of fever/infection during anticipated neutropenic period  Description: INTERVENTIONS  - Monitor WBC  - Administer growth factors as ordered  - Implement neutropenic guidelines  Outcome: Progressing     Problem: SAFETY ADULT - FALL  Goal: Free from fall injury  Description: INTERVENTIONS:  - Assess pt frequently for physical needs  - Identify cognitive and physical deficits and behaviors that affect risk of falls.  - [place] fall precautions as indicated by assessment.  - Educate pt/family on patient safety including physical limitations  - Instruct pt to call for assistance with activity based on assessment  - Modify environment to reduce risk of injury  - Provide assistive devices as appropriate  - Consider OT/PT consult to assist with strengthening/mobility  - Encourage toileting schedule  Outcome: Progressing     Problem: DISCHARGE PLANNING  Goal: Discharge to home or other facility with appropriate resources  Description: INTERVENTIONS:  - Identify barriers to discharge w/pt and caregiver  - Include patient/family/discharge partner in discharge planning  - Arrange for needed discharge resources and transportation as appropriate  - Identify discharge learning needs (meds, wound care, etc)  - Arrange for interpreters to assist at discharge as needed  - Consider post-discharge preferences of patient/family/discharge partner  - Complete POLST form as appropriate  - Assess patient's ability to be responsible for managing their own health  - Refer to Case Management Department for coordinating discharge planning if the patient needs post-hospital services based on physician/LIP order or complex needs related to functional status, cognitive ability or social support system  Outcome: Progressing

## 2025-01-20 NOTE — PHYSICAL THERAPY NOTE
PHYSICAL THERAPY EVALUATION - INPATIENT     Room Number: 401/401-A  Evaluation Date: 1/20/2025  Type of Evaluation: Initial   Physician Order: PT Eval and Treat    Presenting Problem: fall, low back pain, PIERCE, nausea, vomiting  Co-Morbidities : CAD, distal LAD stent on plavix, CHF, mild to mod AR, HTN, HL, temporal arteritis, CKD3, BPH, hypothyroidism, retinopathy, arthritis, severe back pain with lumbar stenosis  Reason for Therapy: Mobility Dysfunction and Discharge Planning    PHYSICAL THERAPY ASSESSMENT   Patient is a 69 year old male admitted 1/18/2025 for fall, low back pain, PIERCE, nausea, vomiting. Prior to admission, patient's baseline is Mod I for functional mobility with use of rolling walker, independent with ADLs. Patient is currently functioning below baseline with bed mobility, transfers, gait, stair negotiation, standing prolonged periods, and performing household tasks. Patient is requiring contact guard assist, minimal assist, and moderate assist as a result of the following impairments: decreased functional strength, decreased endurance/aerobic capacity, pain, impaired standing balance, decreased muscular endurance, medical status, and limited lumbar spine ROM. Physical Therapy will continue to follow for duration of hospitalization.    Patient will benefit from continued skilled PT Services to promote return to prior level of function and safety with continuous assistance and gradual rehabilitative therapy versus HHPT pending pain management.    PLAN DURING HOSPITALIZATION  Nursing Mobility Recommendation : 1 Assist (bed<>chair)  PT Device Recommendation: Rolling walker  PT Treatment Plan: Bed mobility;Body mechanics;Endurance;Energy conservation;Patient education;Gait training;Stair training;Transfer training;Balance training;Strengthening  Rehab Potential : Good  Frequency (Obs): 5x/week     PHYSICAL THERAPY MEDICAL/SOCIAL HISTORY     Problem List  Principal Problem:    PIERCE (acute kidney injury)  ([facility])  Active Problems:    Nausea and vomiting, unspecified vomiting type    Closed head injury, initial encounter    HOME SITUATION  Type of Home: House  Home Layout: Two level;Bed/bath upstairs  Stairs to Enter : 4   Railing: Yes    Stairs to Bedroom: 12    Railing: Yes    Lives With: Spouse    Patient Regularly Uses: Rolling walker (owns cane)     SUBJECTIVE  Agreeable    PHYSICAL THERAPY EXAMINATION   OBJECTIVE  Precautions: Bed/chair alarm;Low vision  Fall Risk:  (Moderate fall risk)    PAIN ASSESSMENT  Rating: Unable to rate  Location: low back  Management Techniques: Activity promotion;Body mechanics;Repositioning    COGNITION  Overall Cognitive Status:  WFL - within functional limits    RANGE OF MOTION AND STRENGTH ASSESSMENT  Upper extremity ROM and strength are within functional limits   Lower extremity ROM is within functional limits   Lower extremity strength is within functional limits     BALANCE  Static Sitting: Fair +  Dynamic Sitting: Fair  Static Standing: Fair -  Dynamic Standing: Poor +    AM-PAC '6-Clicks' INPATIENT SHORT FORM - BASIC MOBILITY  How much difficulty does the patient currently have...  Patient Difficulty: Turning over in bed (including adjusting bedclothes, sheets and blankets)?: A Lot   Patient Difficulty: Sitting down on and standing up from a chair with arms (e.g., wheelchair, bedside commode, etc.): A Little   Patient Difficulty: Moving from lying on back to sitting on the side of the bed?: A Lot   How much help from another person does the patient currently need...   Help from Another: Moving to and from a bed to a chair (including a wheelchair)?: A Little   Help from Another: Need to walk in hospital room?: A Little   Help from Another: Climbing 3-5 steps with a railing?: A Lot     AM-PAC Score:  Raw Score: 15   Approx Degree of Impairment: 57.7%   Standardized Score (AM-PAC Scale): 39.45   CMS Modifier (G-Code): CK    FUNCTIONAL ABILITY STATUS  Functional Mobility/Gait  Assessment  Gait Assistance: Minimum assistance  Distance (ft): 3 ft bed>chair  Assistive Device: Rolling walker  Pattern: Shuffle (decreased magdaleno speed, decreased step length, slightly flexed posture, antalgic)  Rolling: moderate assist via log roll technique  Supine to Sit: moderate assist via log roll technique; cues provided for appropriate sequencing  Sit to Stand: contact guard assist from EOB x 2 trials    Exercise/Education Provided:  Bed mobility  Body mechanics  Energy conservation  Functional activity tolerated  Posture  Transfer training    Skilled Therapy Provided: Patient in bed upon arrival. RN approved activity. Educated patient on POC and benefits of mobilization. Agreeable to participate. Patient reporting low back pain, not quantified per the pain scale. Educated patient on spine precautions in order to minimize pain with functional mobility tasks. Patient benefits from increased time, cues, and assistance in order to complete functional mobility tasks. Patient with limited vision - benefits from objects being placed directly in front of him and increased verbal cues in order to complete tasks. Activity limited due to increased pain.    The patient's Approx Degree of Impairment: 57.7% has been calculated based on documentation in the [facility] '6 clicks' Inpatient Basic Mobility Short Form.  Research supports that patients with this level of impairment may benefit from rehab.  Final disposition will be made by interdisciplinary medical team.    Patient End of Session: Up in chair;With  staff;Needs met;Call light within reach;RN aware of session/findings;All patient questions and concerns addressed;Hospital anti-slip socks (no chair alarm box present in room)    CURRENT GOALS  Goals to be met by: 2/3/25  Patient Goal Patient's self-stated goal is: none stated   Goal #1 Patient is able to demonstrate supine - sit EOB @ level: SBA     Goal #1   Current Status    Goal #2 Patient is able to  demonstrate transfers Sit to/from Stand at assistance level: SBA with walker - rolling     Goal #2  Current Status    Goal #3 Patient is able to ambulate 75 feet with assist device: walker - rolling at assistance level: SBA   Goal #3   Current Status    Goal #4 Stair goal TBD pending progress    Goal #4   Current Status    Goal #5 Patient to demonstrate independence with home activity/exercise instructions provided to patient in preparation for discharge.   Goal #5   Current Status      Patient Evaluation Complexity Level:  History Moderate - 1 or 2 personal factors and/or co-morbidities   Examination of body systems Moderate - addressing a total of 3 or more elements   Clinical Presentation  Moderate - Evolving   Clinical Decision Making  Moderate Complexity     Gait Training: 15 minutes

## 2025-01-20 NOTE — PLAN OF CARE
Alert and oriented x 4. Up with 1 pivot. Receiving IV fluids per MD order. Voiding via urinal. SCDs for DVT prophylaxis. Norco as needed for pain. Vital signs monitored. No acute changes noted throughout shift. Complains of nausea after eating. Fall precautions in place- bed in lowest position, call light and personal belongings within reach, non-skid socks in place. Frequent rounding by nursing staff.    Problem: Patient Centered Care  Goal: Patient preferences are identified and integrated in the patient's plan of care  Description: Interventions:  - What would you like us to know as we care for you? I am having back surgery in February  - Provide timely, complete, and accurate information to patient/family  - Incorporate patient and family knowledge, values, beliefs, and cultural backgrounds into the planning and delivery of care  - Encourage patient/family to participate in care and decision-making at the level they choose  - Honor patient and family perspectives and choices  Outcome: Progressing     Problem: Patient/Family Goals  Goal: Patient/Family Long Term Goal  Description: Patient's Long Term Goal: to be discharged home    Interventions:  - monitor labs and vitals   - pain medications   - update pt and family on plan of care   - follow md orders   - See additional Care Plan goals for specific interventions  Outcome: Progressing  Goal: Patient/Family Short Term Goal  Description: Patient's Short Term Goal: to have less pain    Interventions:   - pain medications   - See additional Care Plan goals for specific interventions  Outcome: Progressing     Problem: PAIN - ADULT  Goal: Verbalizes/displays adequate comfort level or patient's stated pain goal  Description: INTERVENTIONS:  - Encourage pt to monitor pain and request assistance  - Assess pain using appropriate pain scale  - Administer analgesics based on type and severity of pain and evaluate response  - Implement non-pharmacological measures as  appropriate and evaluate response  - Consider cultural and social influences on pain and pain management  - Manage/alleviate anxiety  - Utilize distraction and/or relaxation techniques  - Monitor for opioid side effects  - Notify MD/LIP if interventions unsuccessful or patient reports new pain  - Anticipate increased pain with activity and pre-medicate as appropriate  Outcome: Progressing     Problem: RISK FOR INFECTION - ADULT  Goal: Absence of fever/infection during anticipated neutropenic period  Description: INTERVENTIONS  - Monitor WBC  - Administer growth factors as ordered  - Implement neutropenic guidelines  Outcome: Progressing     Problem: SAFETY ADULT - FALL  Goal: Free from fall injury  Description: INTERVENTIONS:  - Assess pt frequently for physical needs  - Identify cognitive and physical deficits and behaviors that affect risk of falls.  - [place] fall precautions as indicated by assessment.  - Educate pt/family on patient safety including physical limitations  - Instruct pt to call for assistance with activity based on assessment  - Modify environment to reduce risk of injury  - Provide assistive devices as appropriate  - Consider OT/PT consult to assist with strengthening/mobility  - Encourage toileting schedule  Outcome: Progressing     Problem: DISCHARGE PLANNING  Goal: Discharge to home or other facility with appropriate resources  Description: INTERVENTIONS:  - Identify barriers to discharge w/pt and caregiver  - Include patient/family/discharge partner in discharge planning  - Arrange for needed discharge resources and transportation as appropriate  - Identify discharge learning needs (meds, wound care, etc)  - Arrange for interpreters to assist at discharge as needed  - Consider post-discharge preferences of patient/family/discharge partner  - Complete POLST form as appropriate  - Assess patient's ability to be responsible for managing their own health  - Refer to Case Management Department  for coordinating discharge planning if the patient needs post-hospital services based on physician/LIP order or complex needs related to functional status, cognitive ability or social support system  Outcome: Progressing

## 2025-01-21 LAB
ADENOVIRUS F 40/41 PCR: NEGATIVE
ASTROVIRUS PCR: NEGATIVE
C CAYETANENSIS DNA SPEC QL NAA+PROBE: NEGATIVE
C DIFF TOX B STL QL: NEGATIVE
CAMPY SP DNA.DIARRHEA STL QL NAA+PROBE: NEGATIVE
CRYPTOSP DNA SPEC QL NAA+PROBE: NEGATIVE
EAEC PAA PLAS AGGR+AATA ST NAA+NON-PRB: NEGATIVE
EC STX1+STX2 + H7 FLIC SPEC NAA+PROBE: NEGATIVE
ENTAMOEBA HISTOLYTICA PCR: NEGATIVE
EPEC EAE GENE STL QL NAA+NON-PROBE: NEGATIVE
ETEC LTA+ST1A+ST1B TOX ST NAA+NON-PROBE: POSITIVE
GIARDIA LAMBLIA PCR: NEGATIVE
NOROVIRUS GI/GII PCR: NEGATIVE
P SHIGELLOIDES DNA STL QL NAA+PROBE: NEGATIVE
ROTAVIRUS A PCR: NEGATIVE
SALMONELLA DNA SPEC QL NAA+PROBE: NEGATIVE
SAPOVIRUS PCR: NEGATIVE
SHIGELLA SP+EIEC IPAH ST NAA+NON-PROBE: NEGATIVE
V CHOLERAE DNA SPEC QL NAA+PROBE: NEGATIVE
VIBRIO DNA SPEC NAA+PROBE: NEGATIVE
YERSINIA DNA SPEC NAA+PROBE: NEGATIVE

## 2025-01-21 PROCEDURE — 99233 SBSQ HOSP IP/OBS HIGH 50: CPT | Performed by: HOSPITALIST

## 2025-01-21 RX ORDER — PREDNISONE 5 MG/1
7.5 TABLET ORAL DAILY
Qty: 45 TABLET | Refills: 0 | Status: SHIPPED | OUTPATIENT
Start: 2025-01-21

## 2025-01-21 RX ORDER — HYDRALAZINE HYDROCHLORIDE 100 MG/1
100 TABLET, FILM COATED ORAL 2 TIMES DAILY
Status: SHIPPED | COMMUNITY
Start: 2025-01-21

## 2025-01-21 RX ORDER — PANTOPRAZOLE SODIUM 40 MG/1
40 TABLET, DELAYED RELEASE ORAL
Status: DISCONTINUED | OUTPATIENT
Start: 2025-01-22 | End: 2025-01-22

## 2025-01-21 RX ORDER — ALENDRONATE SODIUM 70 MG/1
70 TABLET ORAL WEEKLY
Status: SHIPPED | COMMUNITY
Start: 2025-01-21

## 2025-01-21 NOTE — PROGRESS NOTES
[facility]  part of [facility]    Progress Note    Maximo Diggs Jr. Patient Status:  Inpatient    1955 MRN [id number]   Location [facility] 4W/SW/SE Attending Viri Foote MD   Hosp Day # 2 PCP Lito Pedroza MD     Chief complaint: fall  Subjective:     Pt reports feeling better, he attributes it to extra dose of prednisone  still weak, back pains, but states he is scheduled for spine surgery the first week of Feb  Still some nausea, but overall better, hungry  No high fevers  BP ok  C/o chronic cough, chronic back pain, especially tail bone         Objective:   Blood pressure 160/55, pulse 74, temperature 99 °F (37.2 °C), temperature source Oral, resp. rate 16, height 5' 5\" (1.651 m), weight 121 lb (54.9 kg), SpO2 93%.    GEN: NAD, more comfortable  HEENT: conjunctivae/corneas clear. PERRL, EOM's intact. +moon face from steroids  Neck: no adenopathy, no carotid bruit, no JVD, supple  Pulmonary:  clear to auscultation bilaterally  Cardiovascular: S1, S2 normal, no murmur, click, rub or gallop, regular rate and rhythm  Abdominal: soft,  tender today; bowel sounds normal; no masses,   Extremities: no cyanosis or edema  Pulses: palpable and symmetric  Skin: No rashes or lesions  Neurologic: Alert and oriented X 3, generalized weakness  Psychiatric: calm, cooperative    Assessment and Plan:   PIERCE (acute kidney injury) (HCC) from dehydration  H/o CKD 3  -Continue IV fluids, switch to D5.45 b/c hypernatremia, Cr improved  -Monitor urine output,        Nausea and vomiting, unspecified vomiting type    Abd pains  -Unclear etiology, no sick contacts, per patient no suspicion of foodborne illness  -r/o gastritis/peptic ulcer disease  -IV PPI, incr to BID  -IV nausea as needed  -Abdominal x-ray r/o obstruction ok  -FT, lipase wnl  -wife thinks it's all related to tapering steroids (1 week ago went from 10-->5 mg)  -check CT abd (with oral contrast if able to tolerate, no IV)  shows prostatomegaly, hepatic steatosis, otherwise no acute changes  -May need GI pending progress, pt somewhat reluctant  -seems improving, advance diet as tolerated       Closed head injury, initial encounter  S/p mechanical fall  CT okay  H/o severe back pain with lumbar stenosis, associated with weakness, eval for elective surgery recently.   -PT/OT     Leukocytosis, likely reactive  -Cultures pending  -CBC in the morning better     Others:  CAD, distal LAD stent on plavix,   CHF, mild to mod AR,   HTN (on Bystolic, Losartan, Nifedipine, Hydralazine),   HL,   -->follows at [facility]  H/o temporal arteritis on tapering doses of prednisone ( dose decr to 5 mg recently),   CKD3, BPH,   hypothyroidism,   Retinopathy    Chronic cough (\"from steroids\")  Chronic back pains     DVT proph: SCD for now pending w/u       Dispo: from home with wife, follows usually at [facility], plans for home with HH if tolerates diet, possibly in 1-2 days, pt states he is scheduled for spine surgery the first week of Feb        Supplementary Documentation:   DVT Mechanical Prophylaxis:        DVT Pharmacologic Prophylaxis   Medication   None                Code Status: Full Code  Grant: No urinary catheter in place  Grant Duration (in days):   Central line:    ANKIT: 1/21/2025              Dietitian Malnutrition Assessment    Evaluation for Malnutrition: Criteria for non-severe malnutrition diagnosis-         chronic illness related to   Wt loss 7.5% in 3 months., Energy intake less than 75% for greater than 1 month.       RD Malnutrition Care Plan: Encouraged increased PO intake., Encouraged small frequent meals with emphasis on high calorie/high protein., Intiated ONS (oral nutritional supplements).    Body Fat/Muscle Mass: no wasting noted, malnutrition related to PO intake and weight loss        Physician Assessment     Patient has a diagnosis of moderate malnutrition         Chart reviewed, including current vitals, notes, labs and  imaging  Pertinent past medical records reviewed  Labs ordered and medications adjusted as outlined above  Home medications reconciliation completed  Coordinate care with care team/consultants  Discussed with patient  at bedside results of tests, management plan as outlined above, and the need for ongoing hospitalization  D/w RN     MDM high  severe acute illness/or exacerbation of chronic illness posing a threat to life, IV medications, requiring close monitoring in hospital.       Results:     Lab Results   Component Value Date    WBC 9.9 01/20/2025    HGB 9.9 (L) 01/20/2025    HCT 33.1 (L) 01/20/2025    .0 01/20/2025    CREATSERUM 1.12 01/20/2025    BUN 12 01/20/2025     01/20/2025    K 3.7 01/20/2025     (H) 01/20/2025    CO2 20.0 (L) 01/20/2025     (H) 01/20/2025    CA 8.1 (L) 01/20/2025    ALB 3.6 01/20/2025    ALKPHO 43 (L) 01/18/2025    BILT 0.3 01/18/2025    TP 6.5 01/18/2025    AST 30 01/18/2025    ALT 18 01/18/2025    TSH 1.606 02/25/2024    LIP 44 01/18/2025    DDIMER 0.65 (H) 11/15/2021    ESRML 53 (H) 09/23/2021    CRP 0.50 (H) 09/23/2021    MG 1.8 01/20/2025    PHOS 1.4 (L) 01/20/2025    TROP <0.045 11/15/2021     02/25/2024    B12 527 02/08/2017       CT ABDOMEN+PELVIS(CPT=74176)    Result Date: 1/19/2025  CONCLUSION:  1. Mild mural thickening urinary bladder may be related to chronic bladder outlet obstruction from prostate enlargement, or less likely cystitis.  No other acute finding. 2. Generalized atherosclerosis including coronary artery calcification.  Celiac and left iliac stents. 3. Hepatic steatosis. 4. Trace left pleural effusion.    Dictated by (CST): Zaheer Davis MD on 1/19/2025 at 6:02 PM     Finalized by (CST): Zaheer Davis MD on 1/19/2025 at 6:13 PM          XR ABDOMEN (1 VIEW) (CPT=74018)    Result Date: 1/19/2025  CONCLUSION:   Nonobstructive bowel gas pattern.  Small amount of air and stool within the colon.     Dictated by (CST): Agata Rapp,  MD on 1/19/2025 at 1:23 PM     Finalized by (CST): Agata Rapp MD on 1/19/2025 at 1:26 PM                 TRACEY DUVALL MD  1/20/2025

## 2025-01-21 NOTE — PHYSICAL THERAPY NOTE
PHYSICAL THERAPY TREATMENT NOTE - INPATIENT     Room Number: 401/401-A       Presenting Problem: fall, low back pain, PIERCE, nausea, vomiting  Co-Morbidities : CAD, distal LAD stent on plavix, CHF, mild to mod AR, HTN, HL, temporal arteritis, CKD3, BPH, hypothyroidism, retinopathy, arthritis, severe back pain with lumbar stenosis    Problem List  Principal Problem:    PIERCE (acute kidney injury) (HCC)  Active Problems:    Nausea and vomiting, unspecified vomiting type    Closed head injury, initial encounter      PHYSICAL THERAPY ASSESSMENT   Patient demonstrates fair progress this session, goals  updated to reflect patient performance.      Patient is requiring minimal assist as a result of the following impairments: decreased functional strength, decreased endurance/aerobic capacity, pain, impaired standing balance, impaired coordination, impaired motor planning, decreased muscular endurance, and medical status.     Patient continues to function below baseline with bed mobility, transfers, gait, maintaining seated position, standing prolonged periods, and performing household tasks.  Next session anticipate patient to progress bed mobility, transfers, gait, stair negotiation, maintaining seated position, standing prolonged periods, and performing household tasks.  Physical Therapy will continue to follow patient for duration of hospitalization.    Patient continues to benefit from continued skilled PT services: at discharge to promote prior level of function.  Anticipate patient will return home with home health PT.    If [facility] PT is not an option GR with PT, OT should be considered as a secondary discharge disposition plan.     PLAN DURING HOSPITALIZATION  Nursing Mobility Recommendation : 1 Assist  PT Device Recommendation: Rolling walker  PT Treatment Plan: Bed mobility;Body mechanics;Patient education;Endurance;Energy conservation;Gait training;Strengthening;Transfer training;Balance training  Frequency (Obs):  5x/week     SUBJECTIVE  I plan to return home with my spouse to help as needed with the RW.     OBJECTIVE  Precautions: Bed/chair alarm;Low vision    WEIGHT BEARING RESTRICTION       PAIN ASSESSMENT   Rating: Unable to rate  Location: low back  Management Techniques: Activity promotion;Body mechanics;Repositioning    BALANCE  Static Sitting: Fair +  Dynamic Sitting: Fair  Static Standing: Fair -  Dynamic Standing: Poor +    AM-PAC '6-Clicks' INPATIENT SHORT FORM - BASIC MOBILITY  How much difficulty does the patient currently have...  Patient Difficulty: Turning over in bed (including adjusting bedclothes, sheets and blankets)?: A Little   Patient Difficulty: Sitting down on and standing up from a chair with arms (e.g., wheelchair, bedside commode, etc.): A Little   Patient Difficulty: Moving from lying on back to sitting on the side of the bed?: A Little   How much help from another person does the patient currently need...   Help from Another: Moving to and from a bed to a chair (including a wheelchair)?: A Little   Help from Another: Need to walk in hospital room?: A Little   Help from Another: Climbing 3-5 steps with a railing?: A Lot     AM-PAC Score:  Raw Score: 17   Approx Degree of Impairment: 50.57%   Standardized Score (AM-PAC Scale): 42.13   CMS Modifier (G-Code): CK    FUNCTIONAL ABILITY STATUS  Functional Mobility/Gait Assessment  Gait Assistance: Minimum assistance  Distance (ft): 20  Assistive Device: Rolling walker  Pattern: Shuffle (antalgic gait pain in buttock and legs unsteady shuffling gait)  Rolling: minimal assist  Supine to Sit: minimal assist  Sit to Supine: minimal assist  Sit to Stand: minimal assist    Skilled Therapy Provided: Pt ed with bed mobility and transfers with RW with min A. Pt is limited by pain with buttock and LE pain with movement 8/10 pt was premedicated prior to PT session for optimal mobility and comofrt. Pt ed with gait progression 20' with shuffling unsteady gait with  RW and min A. Pt is unable to tolerate stair mobility. Pt ed with therex in the chair as tolerable x 10 reps for HEP. Pt is motivated to return home as medicla progress allows. [facility] PT with spouse assist with medi care for stairs to reenter home and [facility] PT is recommended as medical progress allows.     The patient's Approx Degree of Impairment: 50.57% has been calculated based on documentation in the [facility] '6 clicks' Inpatient Daily Activity Short Form.  Research supports that patients with this level of impairment may benefit from [facility] PT with family assist.  Final disposition will be made by interdisciplinary medical team.    THERAPEUTIC EXERCISES  Lower Extremity Ankle pumps  Heel slides  LAQ     Position Sitting & Standing       Patient End of Session: Up in chair;With  staff;Needs met;Call light within reach;RN aware of session/findings;All patient questions and concerns addressed;Alarm set    CURRENT GOALS   Goals to be met by: 2/3/25  Patient Goal Patient's self-stated goal is: none stated   Goal #1 Patient is able to demonstrate supine - sit EOB @ level: SBA      Goal #1   Current Status  Min A    Goal #2 Patient is able to demonstrate transfers Sit to/from Stand at assistance level: SBA with walker - rolling      Goal #2  Current Status  Min A with RW   Goal #3 Patient is able to ambulate 75 feet with assist device: walker - rolling at assistance level: SBA   Goal #3   Current Status  Min A with RW 20' unsteady antalgic gait shuffling   Goal #4 Stair goal TBD pending progress    Goal #4   Current Status  Ongoing    Goal #5 Patient to demonstrate independence with home activity/exercise instructions provided to patient in preparation for discharge.   Goal #5   Current Status  Ongoing     Gait Training: 15 minutes  Therapeutic Exercise: 10 minutes

## 2025-01-21 NOTE — DISCHARGE INSTRUCTIONS
Sometimes managing your health at home requires assistance.  The [facility] team has recognized your preference to use [facility]  A representative from the home health agency will contact you or your family to schedule your first visit.      [facility]  [address]  [place] [address]  Phone: [phone]  Fax: [phone]

## 2025-01-21 NOTE — PROGRESS NOTES
[facility]  part of [facility]    Progress Note    Maximo Diggs Jr. Patient Status:  Inpatient    1955 MRN [id number]   Location [facility] 4W/SW/SE Attending Viri Foote MD   Hosp Day # 3 PCP Lito Pedroza MD     Chief complaint: fall  Subjective:     Pt reports feeling better, he attributes it to extra dose of prednisone  still weak, back pains the same, but states he is scheduled for spine surgery the first week of Feb, wants to go home to wait for that  Still some nausea, but overall better, hungry, eating better  States, pain pills hurting his stomach  No high fevers  BP ok           Objective:   Blood pressure 135/66, pulse 76, temperature 97.6 °F (36.4 °C), temperature source Oral, resp. rate 16, height 5' 5\" (1.651 m), weight 121 lb (54.9 kg), SpO2 95%.    GEN: NAD, more comfortable  HEENT: conjunctivae/corneas clear. PERRL, EOM's intact. +moon face from steroids  Neck: no adenopathy, no carotid bruit, no JVD, supple  Pulmonary:  clear to auscultation bilaterally  Cardiovascular: S1, S2 normal, no murmur, click, rub or gallop, regular rate and rhythm  Abdominal: soft,  tender today; bowel sounds normal; no masses,   Extremities: no cyanosis or edema  Pulses: palpable and symmetric  Skin: No rashes or lesions  Neurologic: Alert and oriented X 3, generalized weakness  Psychiatric: calm, cooperative    Assessment and Plan:   PIERCE (acute kidney injury) (HCC) from dehydration  H/o CKD 3  -IV fluids, switch to D5.45 b/c hypernatremia, Cr improved, ok to stop  -Monitor urine output,        Nausea and vomiting, unspecified vomiting type    Abd pains  -Unclear etiology, no sick contacts, per patient no suspicion of foodborne illness  --->however, GI panel came positive for ETEC  -r/o gastritis/peptic ulcer disease  -IV PPI, incr to BID  -IV nausea as needed  -Abdominal x-ray r/o obstruction ok  -FT, lipase wnl  -wife thinks it's all related to tapering steroids (1  week ago went from 10-->5 mg)  -check CT abd (with oral contrast if able to tolerate, no IV) shows prostatomegaly, hepatic steatosis, otherwise no acute changes  -overall improving, advance diet as tolerated       Closed head injury, initial encounter  S/p mechanical fall  CT okay  H/o severe back pain with lumbar stenosis, associated with weakness, eval for elective surgery recently.   -PT/OT     Leukocytosis, likely reactive  -Cultures pending  -CBC in the morning better     Others:  CAD, distal LAD stent on plavix,   CHF, mild to mod AR,   HTN (on Bystolic, Losartan, Nifedipine, Hydralazine),   HL,   -->follows at [facility]  H/o temporal arteritis on tapering doses of prednisone ( dose decr to 5 mg recently)--->pt feels much mor comfortable on slightly higher dose of 7.5 mg, will continue now, he might need much slower taper, he says he has been on steroids over 4 years , f/u rheumatology outpatient  CKD3, BPH,   hypothyroidism,   Retinopathy    Chronic cough (\"from steroids\")  Chronic back pains     DVT proph: SCD for now pending w/u       Dispo: from home with wife, follows usually at [facility], plans for home with HH if tolerates diet, plan for tomorrow, pt states he is scheduled for spine surgery the first week of Feb        Supplementary Documentation:   DVT Mechanical Prophylaxis:        DVT Pharmacologic Prophylaxis   Medication   None                Code Status: Full Code  Grant: No urinary catheter in place  Grant Duration (in days):   Central line:    ANKIT: 1/22/2025              Dietitian Malnutrition Assessment    Evaluation for Malnutrition: Criteria for non-severe malnutrition diagnosis-         chronic illness related to   Wt loss 7.5% in 3 months., Energy intake less than 75% for greater than 1 month.       RD Malnutrition Care Plan: Encouraged increased PO intake., Encouraged small frequent meals with emphasis on high calorie/high protein., Intiated ONS (oral nutritional supplements).    Body  Fat/Muscle Mass: no wasting noted, malnutrition related to PO intake and weight loss        Physician Assessment     Patient has a diagnosis of moderate malnutrition         Chart reviewed, including current vitals, notes, labs and imaging  Pertinent past medical records reviewed  Labs ordered and medications adjusted as outlined above  Home medications reconciliation completed  Coordinate care with care team/consultants  Discussed with patient  at bedside results of tests, management plan as outlined above, and the need for ongoing hospitalization  D/w RN     MDM high  severe acute illness/or exacerbation of chronic illness posing a threat to life, IV medications, requiring close monitoring in hospital.       Results:     Lab Results   Component Value Date    WBC 9.9 01/20/2025    HGB 9.9 (L) 01/20/2025    HCT 33.1 (L) 01/20/2025    .0 01/20/2025    CREATSERUM 1.12 01/20/2025    BUN 12 01/20/2025     01/20/2025    K 3.7 01/20/2025     (H) 01/20/2025    CO2 20.0 (L) 01/20/2025     (H) 01/20/2025    CA 8.1 (L) 01/20/2025    ALB 3.6 01/20/2025    ALKPHO 43 (L) 01/18/2025    BILT 0.3 01/18/2025    TP 6.5 01/18/2025    AST 30 01/18/2025    ALT 18 01/18/2025    TSH 1.606 02/25/2024    LIP 44 01/18/2025    DDIMER 0.65 (H) 11/15/2021    ESRML 53 (H) 09/23/2021    CRP 0.50 (H) 09/23/2021    MG 1.8 01/20/2025    PHOS 1.4 (L) 01/20/2025    TROP <0.045 11/15/2021     02/25/2024    B12 527 02/08/2017       No results found.          TRACEY DUVALL MD  1/21/2025

## 2025-01-21 NOTE — PLAN OF CARE
Patient has safety precautions in place bed in the lowest position, bed alarm on, and call light within reach. Plan of care ongoing. No further concerns as of present.    Problem: Patient Centered Care  Goal: Patient preferences are identified and integrated in the patient's plan of care  Description: Interventions:  - What would you like us to know as we care for you? I am having back surgery in February  - Provide timely, complete, and accurate information to patient/family  - Incorporate patient and family knowledge, values, beliefs, and cultural backgrounds into the planning and delivery of care  - Encourage patient/family to participate in care and decision-making at the level they choose  - Honor patient and family perspectives and choices  Outcome: Progressing     Problem: Patient/Family Goals  Goal: Patient/Family Long Term Goal  Description: Patient's Long Term Goal: to be discharged home    Interventions:  - monitor labs and vitals   - pain medications   - update pt and family on plan of care   - follow md orders   - See additional Care Plan goals for specific interventions  Outcome: Progressing  Goal: Patient/Family Short Term Goal  Description: Patient's Short Term Goal: to have less pain    Interventions:   - pain medications   - See additional Care Plan goals for specific interventions  Outcome: Progressing     Problem: PAIN - ADULT  Goal: Verbalizes/displays adequate comfort level or patient's stated pain goal  Description: INTERVENTIONS:  - Encourage pt to monitor pain and request assistance  - Assess pain using appropriate pain scale  - Administer analgesics based on type and severity of pain and evaluate response  - Implement non-pharmacological measures as appropriate and evaluate response  - Consider cultural and social influences on pain and pain management  - Manage/alleviate anxiety  - Utilize distraction and/or relaxation techniques  - Monitor for opioid side effects  - Notify MD/LIP if  interventions unsuccessful or patient reports new pain  - Anticipate increased pain with activity and pre-medicate as appropriate  Outcome: Progressing     Problem: RISK FOR INFECTION - ADULT  Goal: Absence of fever/infection during anticipated neutropenic period  Description: INTERVENTIONS  - Monitor WBC  - Administer growth factors as ordered  - Implement neutropenic guidelines  Outcome: Progressing     Problem: SAFETY ADULT - FALL  Goal: Free from fall injury  Description: INTERVENTIONS:  - Assess pt frequently for physical needs  - Identify cognitive and physical deficits and behaviors that affect risk of falls.  - [place] fall precautions as indicated by assessment.  - Educate pt/family on patient safety including physical limitations  - Instruct pt to call for assistance with activity based on assessment  - Modify environment to reduce risk of injury  - Provide assistive devices as appropriate  - Consider OT/PT consult to assist with strengthening/mobility  - Encourage toileting schedule  Outcome: Progressing     Problem: DISCHARGE PLANNING  Goal: Discharge to home or other facility with appropriate resources  Description: INTERVENTIONS:  - Identify barriers to discharge w/pt and caregiver  - Include patient/family/discharge partner in discharge planning  - Arrange for needed discharge resources and transportation as appropriate  - Identify discharge learning needs (meds, wound care, etc)  - Arrange for interpreters to assist at discharge as needed  - Consider post-discharge preferences of patient/family/discharge partner  - Complete POLST form as appropriate  - Assess patient's ability to be responsible for managing their own health  - Refer to Case Management Department for coordinating discharge planning if the patient needs post-hospital services based on physician/LIP order or complex needs related to functional status, cognitive ability or social support system  Outcome: Progressing

## 2025-01-22 VITALS
SYSTOLIC BLOOD PRESSURE: 157 MMHG | RESPIRATION RATE: 18 BRPM | OXYGEN SATURATION: 95 % | TEMPERATURE: 99 F | WEIGHT: 121 LBS | BODY MASS INDEX: 20.16 KG/M2 | HEIGHT: 65 IN | HEART RATE: 78 BPM | DIASTOLIC BLOOD PRESSURE: 53 MMHG

## 2025-01-22 PROCEDURE — 99239 HOSP IP/OBS DSCHRG MGMT >30: CPT | Performed by: HOSPITALIST

## 2025-01-22 NOTE — CM/SW NOTE
01/20/25 1700   CM/SW Referral Data   Referral Source Physician;Social Work (self-referral)   Reason for Referral Discharge planning   Informant Patient   Medical Hx   Does patient have an established PCP? Yes   Patient Info   Patient's Current Mental Status at Time of Assessment Alert;Oriented   Patient's Home Environment House   Number of Levels in Home 2   Patient lives with Spouse/Significant other   Patient Status Prior to Admission   Independent with ADLs and Mobility Yes   Discharge Needs   Anticipated D/C needs Subacute rehab   Services Requested   Submitted to [facility] Yes   PASRR Level 1 Submitted Yes   PMR Consult Requested Not clinically appropriate at this time   Choice of Post-Acute Provider   Informed patient of right to choose their preferred provider Yes   List of appropriate post-acute services provided to patient/family with quality data Yes     SW spoke with pt and called wife as well to discuss DC planning    Wife and pt would like time to discuss if they are agreeable to have pt go to a SNF and which SNF.     Pt is unable to view documents in front and would like the list emailed to his wife if possible    [email or web address] once completed    PASRR completed SNF list still pending    Will follow up with list in the AM    PLAN: SNF - pending list/ choice/ email    Marysol ALEJANDREW, MSW ext. [phone]    
   01/21/25 1200   Choice of Post-Acute Provider   Informed patient of right to choose their preferred provider Yes   List of appropriate post-acute services provided to patient/family with quality data No - Declined list   Patient/family choice [facility]   Information given to Spouse/Significant other     Anticipated therapy need: Home with Home Healthcare    CM sent home healthcare referrals via Aidin.  Orders and face to face entered.    CM notified by MARITA Felton - patient discharge preference is home w/ home healthcare.      CM called patient's spouse Heavenly to discuss above.  Heavenly is agreeable to home healthcare arrangement with [facility].  CM reserved [facility] via Aidin and notified liaison Leah of choice. Patient discharge instructions updated with [facility] contact information.     / to remain available for support and/or discharge planning.     Plan: Home with spouse and [facility] - once medically cleared    Greer Brown RN, BSN  Nurse   [phone]        
   01/22/25 1100   Discharge disposition   Expected discharge disposition Home-Health  ([facility])   Discharge transportation Private car     MDO placed for discharge.     notified [facility] via Aidin of patient discharge today.  [facility] will be reaching out to patient/family to coordinate start of care.    Patient cleared for discharge from CM/SW standpoint.    Greer Brown RN, BSN  Nurse   [phone]    
Department  notified of request for mary BERRY referrals started. Assigned CM/SW to follow up with pt/family on further discharge planning.       Coreen Zhao, DSC    
no fever and no chills.

## 2025-01-22 NOTE — PLAN OF CARE
Patient A&Ox4. Monitoring VS. Norco given for pain. Voiding freely. Needs encouragement of oral intake. Up x1 with a walker, needs reorientation to chair due to vision impairment. Fall precautions in place. Frequent rounding by staff, call light and all personal belongings within reach.     Patient cleared by internal medicine,  PT/OT, and social work. Going home with home health.  IV removed, discharge education provided, patient sent home with all personal belongings, and discharge instructions. Addressed additional questions.       Problem: Patient Centered Care  Goal: Patient preferences are identified and integrated in the patient's plan of care  Description: Interventions:  - What would you like us to know as we care for you? I am having back surgery in February  - Provide timely, complete, and accurate information to patient/family  - Incorporate patient and family knowledge, values, beliefs, and cultural backgrounds into the planning and delivery of care  - Encourage patient/family to participate in care and decision-making at the level they choose  - Honor patient and family perspectives and choices  Outcome: Adequate for Discharge     Problem: Patient/Family Goals  Goal: Patient/Family Long Term Goal  Description: Patient's Long Term Goal: to be discharged home    Interventions:  - monitor labs and vitals   - pain medications   - update pt and family on plan of care   - follow md orders   - See additional Care Plan goals for specific interventions  Outcome: Adequate for Discharge  Goal: Patient/Family Short Term Goal  Description: Patient's Short Term Goal: to have less pain    Interventions:   - pain medications   - See additional Care Plan goals for specific interventions  Outcome: Adequate for Discharge     Problem: PAIN - ADULT  Goal: Verbalizes/displays adequate comfort level or patient's stated pain goal  Description: INTERVENTIONS:  - Encourage pt to monitor pain and request assistance  - Assess  pain using appropriate pain scale  - Administer analgesics based on type and severity of pain and evaluate response  - Implement non-pharmacological measures as appropriate and evaluate response  - Consider cultural and social influences on pain and pain management  - Manage/alleviate anxiety  - Utilize distraction and/or relaxation techniques  - Monitor for opioid side effects  - Notify MD/LIP if interventions unsuccessful or patient reports new pain  - Anticipate increased pain with activity and pre-medicate as appropriate  Outcome: Adequate for Discharge     Problem: RISK FOR INFECTION - ADULT  Goal: Absence of fever/infection during anticipated neutropenic period  Description: INTERVENTIONS  - Monitor WBC  - Administer growth factors as ordered  - Implement neutropenic guidelines  Outcome: Adequate for Discharge     Problem: SAFETY ADULT - FALL  Goal: Free from fall injury  Description: INTERVENTIONS:  - Assess pt frequently for physical needs  - Identify cognitive and physical deficits and behaviors that affect risk of falls.  - [place] fall precautions as indicated by assessment.  - Educate pt/family on patient safety including physical limitations  - Instruct pt to call for assistance with activity based on assessment  - Modify environment to reduce risk of injury  - Provide assistive devices as appropriate  - Consider OT/PT consult to assist with strengthening/mobility  - Encourage toileting schedule  Outcome: Adequate for Discharge     Problem: DISCHARGE PLANNING  Goal: Discharge to home or other facility with appropriate resources  Description: INTERVENTIONS:  - Identify barriers to discharge w/pt and caregiver  - Include patient/family/discharge partner in discharge planning  - Arrange for needed discharge resources and transportation as appropriate  - Identify discharge learning needs (meds, wound care, etc)  - Arrange for interpreters to assist at discharge as needed  - Consider post-discharge  preferences of patient/family/discharge partner  - Complete POLST form as appropriate  - Assess patient's ability to be responsible for managing their own health  - Refer to Case Management Department for coordinating discharge planning if the patient needs post-hospital services based on physician/LIP order or complex needs related to functional status, cognitive ability or social support system  Outcome: Adequate for Discharge

## 2025-01-22 NOTE — PLAN OF CARE
Patient has safety precautions in place bed in the lowest position, bed alarm on, and call light within reach. Plan of care ongoing. No further concerns as of present.    Problem: Patient Centered Care  Goal: Patient preferences are identified and integrated in the patient's plan of care  Description: Interventions:  - What would you like us to know as we care for you? I am having back surgery in February  - Provide timely, complete, and accurate information to patient/family  - Incorporate patient and family knowledge, values, beliefs, and cultural backgrounds into the planning and delivery of care  - Encourage patient/family to participate in care and decision-making at the level they choose  - Honor patient and family perspectives and choices  Outcome: Progressing     Problem: Patient/Family Goals  Goal: Patient/Family Long Term Goal  Description: Patient's Long Term Goal: to be discharged home    Interventions:  - monitor labs and vitals   - pain medications   - update pt and family on plan of care   - follow md orders   - See additional Care Plan goals for specific interventions  Outcome: Progressing  Goal: Patient/Family Short Term Goal  Description: Patient's Short Term Goal: to have less pain    Interventions:   - pain medications   - See additional Care Plan goals for specific interventions  Outcome: Progressing     Problem: PAIN - ADULT  Goal: Verbalizes/displays adequate comfort level or patient's stated pain goal  Description: INTERVENTIONS:  - Encourage pt to monitor pain and request assistance  - Assess pain using appropriate pain scale  - Administer analgesics based on type and severity of pain and evaluate response  - Implement non-pharmacological measures as appropriate and evaluate response  - Consider cultural and social influences on pain and pain management  - Manage/alleviate anxiety  - Utilize distraction and/or relaxation techniques  - Monitor for opioid side effects  - Notify MD/LIP if  interventions unsuccessful or patient reports new pain  - Anticipate increased pain with activity and pre-medicate as appropriate  Outcome: Progressing     Problem: RISK FOR INFECTION - ADULT  Goal: Absence of fever/infection during anticipated neutropenic period  Description: INTERVENTIONS  - Monitor WBC  - Administer growth factors as ordered  - Implement neutropenic guidelines  Outcome: Progressing     Problem: SAFETY ADULT - FALL  Goal: Free from fall injury  Description: INTERVENTIONS:  - Assess pt frequently for physical needs  - Identify cognitive and physical deficits and behaviors that affect risk of falls.  - [place] fall precautions as indicated by assessment.  - Educate pt/family on patient safety including physical limitations  - Instruct pt to call for assistance with activity based on assessment  - Modify environment to reduce risk of injury  - Provide assistive devices as appropriate  - Consider OT/PT consult to assist with strengthening/mobility  - Encourage toileting schedule  Outcome: Progressing     Problem: DISCHARGE PLANNING  Goal: Discharge to home or other facility with appropriate resources  Description: INTERVENTIONS:  - Identify barriers to discharge w/pt and caregiver  - Include patient/family/discharge partner in discharge planning  - Arrange for needed discharge resources and transportation as appropriate  - Identify discharge learning needs (meds, wound care, etc)  - Arrange for interpreters to assist at discharge as needed  - Consider post-discharge preferences of patient/family/discharge partner  - Complete POLST form as appropriate  - Assess patient's ability to be responsible for managing their own health  - Refer to Case Management Department for coordinating discharge planning if the patient needs post-hospital services based on physician/LIP order or complex needs related to functional status, cognitive ability or social support system  Outcome: Progressing

## 2025-01-22 NOTE — DISCHARGE SUMMARY
[facility]  part of [facility]     DISCHARGE SUMMARY     Maximo Diggs Jr. Patient Status:  Inpatient    1955 MRN [id number]   Location [facility] 4W/SW/SE Attending Naif Pedro MD   Hosp Day # 4 PCP Lito Pedroza MD     DATE OF ADMISSION: 2025  DATE OF DISCHARGE:  25  DISPOSITION: home  CONDITION ON DISCHARGE: good    DISCHARGE DIAGNOSES:  Acute kidney injury  History of CKD stage III  Chronic back pain  ETEC gastroenteritis  Closed head injury  Leukocytosis  Coronary artery disease  CHF  Dyslipidemia  BPH  Hypothyroidism    HISTORY OF PRESENT ILLNESS (COPIED FROM ADMISSION H&P)  Maximo Diggs Jr. is a(n) 69 year old male with h/o CAD, distal LAD stent on plavix, CHF, mild to mod AR, HTN (on Bystolic, Losartan, Nifedipine, Hydralazine), HL, follows at [facility], temporal arteritis on tapering doses of prednisone (about month ago decr to 5 mg), CKD3, BPH, hypothyroidism, retinopathy, arthritis, on alendronate, severe back pain with lumbar stenosis, associated with weakness, eval for elective surgery recently.Now presents to ER after mechanical fall.  Patient reports not feeling well for few weeks, with upset stomach, poor appetite, nausea and vomiting.  In the ER found elevated creatinine and leukocytosis.  No diarrhea.    HOSPITAL COURSE:  Patient was admitted, seen by PT/OT.  No evidence of acute fracture.  Patient has chronic severe back pain, supposed to have back surgery in 2 weeks at [facility].  Some of his generalized weakness may be related to tapering steroids for PMR.  This in combination with mild gastritis probably causes fall.  No red flag symptoms of spinal cord compromise.    Patient understands return to the emergency room for increased pain, fever, discharge, shortness of breath, chest pain, new neurologic symptoms, other concerning symptoms.    PHYSICAL EXAM:  Temp:  [97.6 °F (36.4 °C)-99.2 °F (37.3 °C)] 99.2 °F (37.3  °C)  Pulse:  [76-82] 78  Resp:  [16-18] 18  BP: (135-183)/(53-67) 157/53  SpO2:  [87 %-97 %] 95 %  Gen: A+Ox3.  No distress.   HEENT: NCAT, neck supple, no carotid bruit.  CV: RRR, S1S2, and intact distal pulses. No gallop, rub, murmur.  Pulm: Effort and breath sounds normal. No distress, wheezes, rales, rhonchi.  Abd: Soft, NTND, BS normal, no mass, no HSM, no rebound/guarding.   Neuro: Normal reflexes, CN. Sensory/motor exams grossly normal deficit. Coordination  and gait normal.   MS: No joint effusions.  No peripheral edema.  Skin: Skin is warm and dry. No rashes, erythema, diaphoresis.   Psych: Normal mood and affect. Behavior and judgment normal.     DISCHARGE MEDICATIONS     Discharge Medications        CHANGE how you take these medications        Instructions Prescription details   alendronate 70 MG Tabs  Commonly known as: Fosamax  What changed: additional instructions      Take 1 tablet (70 mg total) by mouth once a week. On SUNDAYS only HOLD UNTIL AFTER SURGERY   Refills: 0     predniSONE 5 MG Tabs  Commonly known as: Deltasone  What changed:   medication strength  how much to take      Take 1.5 tablets (7.5 mg total) by mouth daily.   Quantity: 45 tablet  Refills: 0            CONTINUE taking these medications        Instructions Prescription details   alfuzosin ER 10 MG Tb24  Commonly known as: Uroxatral ER      Take 1 tablet (10 mg total) by mouth daily.   Refills: 0     bisoprolol 5 MG Tabs  Commonly known as: Zebeta      Take 2 tablets (10 mg total) by mouth in the morning and 2 tablets (10 mg total) before bedtime.   Refills: 0     cholecalciferol 25 MCG (1000 UT) Tabs  Commonly known as: Vitamin D3      Take 1 tablet (1,000 Units total) by mouth daily.   Refills: 0     clopidogrel 75 MG Tabs  Commonly known as: Plavix      Take 1 tablet (75 mg total) by mouth daily.   Refills: 0     CoQ10 100 MG Caps      Take 200 mg by mouth in the morning and 200 mg before bedtime.   Refills: 0      cyclobenzaprine 10 MG Tabs  Commonly known as: Flexeril      Take 1 tablet (10 mg total) by mouth 3 (three) times daily as needed for Muscle spasms.   Refills: 0     Esomeprazole Magnesium 20 MG Cpdr  Commonly known as: NEXIUM      Take 1 capsule (20 mg total) by mouth every morning before breakfast.   Refills: 0     ezetimibe 10 MG Tabs  Commonly known as: Zetia      Take 1 tablet (10 mg total) by mouth daily.   Refills: 0     famotidine 40 MG Tabs  Commonly known as: Pepcid      Take 1 tablet (40 mg total) by mouth as needed.   Refills: 0     hydrALAZINE 100 MG Tabs  Commonly known as: Apresoline      Take 1 tablet (100 mg total) by mouth 2 (two) times daily.   Refills: 0     HYDROcodone-acetaminophen 5-325 MG Tabs  Commonly known as: Norco      Take 1 tablet by mouth every 6 (six) hours as needed for Pain.   Quantity: 20 tablet  Refills: 0     levothyroxine 25 MCG Tabs  Commonly known as: Synthroid      Take 1 tablet (25 mcg total) by mouth before breakfast.   Refills: 0     NIFEdipine ER 60 MG Tb24  Commonly known as: Procardia-XL      Take 1 tablet (60 mg total) by mouth in the morning and 1 tablet (60 mg total) before bedtime.   Refills: 0     ondansetron 4 MG Tbdp  Commonly known as: Zofran-ODT      Take 1 tablet (4 mg total) by mouth every 8 (eight) hours as needed for Nausea.   Quantity: 6 tablet  Refills: 0            STOP taking these medications      sulfamethoxazole-trimethoprim -160 MG Tabs per tablet  Commonly known as: Bactrim DS                  Where to Get Your Medications        These medications were sent to [id number] - [place], [place] - [address] [phone], [phone]  [address], [facility] [address]      Phone: [phone]   predniSONE 5 MG Tabs         CONSULTANTS      FOLLOW UP:  Lito Pedroza MD  [address]  [address]  [place] [address] [phone]    Schedule an appointment as soon as possible for a visit      The above plan and  follow-up instructions were reviewed with the patient and they verbalized understanding and agreement.  They understand to return to the emergency room for any concerning signs or symptoms.  Greater than 30 minutes spent on discharge.  -----------------------    Hospital Discharge Diagnoses: Acute kidney injury    Lace+ Score: 56  59-90 High Risk  29-58 Medium Risk  0-28   Low Risk.    TCM Follow-Up Recommendation:  LACE 29-58: Moderate Risk of readmission after discharge from the hospital.

## 2025-01-22 NOTE — CONGREGATE LIVING REVIEW
Congregate Living Authorization    The [facility] Review Committee (CLRC) has reviewed this case and the committee DOES NOT RECOMMEND discharge to a skilled nursing facility under skilled care.    The CLRC recommends:  Home with home health and any other appropriate caregiver assistance or medical equipment as needed vs respite in SNF.     Does not appear to have skilled services for KRISTI, but additional home support appears to be needed.    For questions regarding CLRC approval process, please contact the CM assigned to the case.  For questions regarding RN discharge workflow, please contact the unit Clinical Leader.

## 2025-01-22 NOTE — PLAN OF CARE
Patient alert and oriented x4. On room air. Visually impaired. Scd and plavix for dvt prophylaxis. Voiding via urinal. Norco/Flexeril for pain management. Pt able to stand and pivot. Plan for discharge home tomorrow and follow up for his outpatient surgery     Problem: Patient Centered Care  Goal: Patient preferences are identified and integrated in the patient's plan of care  Description: Interventions:  - What would you like us to know as we care for you? I am having back surgery in February  - Provide timely, complete, and accurate information to patient/family  - Incorporate patient and family knowledge, values, beliefs, and cultural backgrounds into the planning and delivery of care  - Encourage patient/family to participate in care and decision-making at the level they choose  - Honor patient and family perspectives and choices  Outcome: Progressing     Problem: PAIN - ADULT  Goal: Verbalizes/displays adequate comfort level or patient's stated pain goal  Description: INTERVENTIONS:  - Encourage pt to monitor pain and request assistance  - Assess pain using appropriate pain scale  - Administer analgesics based on type and severity of pain and evaluate response  - Implement non-pharmacological measures as appropriate and evaluate response  - Consider cultural and social influences on pain and pain management  - Manage/alleviate anxiety  - Utilize distraction and/or relaxation techniques  - Monitor for opioid side effects  - Notify MD/LIP if interventions unsuccessful or patient reports new pain  - Anticipate increased pain with activity and pre-medicate as appropriate  Outcome: Progressing     Problem: RISK FOR INFECTION - ADULT  Goal: Absence of fever/infection during anticipated neutropenic period  Description: INTERVENTIONS  - Monitor WBC  - Administer growth factors as ordered  - Implement neutropenic guidelines  Outcome: Progressing     Problem: SAFETY ADULT - FALL  Goal: Free from fall injury  Description:  INTERVENTIONS:  - Assess pt frequently for physical needs  - Identify cognitive and physical deficits and behaviors that affect risk of falls.  - Guild fall precautions as indicated by assessment.  - Educate pt/family on patient safety including physical limitations  - Instruct pt to call for assistance with activity based on assessment  - Modify environment to reduce risk of injury  - Provide assistive devices as appropriate  - Consider OT/PT consult to assist with strengthening/mobility  - Encourage toileting schedule  Outcome: Progressing     Problem: DISCHARGE PLANNING  Goal: Discharge to home or other facility with appropriate resources  Description: INTERVENTIONS:  - Identify barriers to discharge w/pt and caregiver  - Include patient/family/discharge partner in discharge planning  - Arrange for needed discharge resources and transportation as appropriate  - Identify discharge learning needs (meds, wound care, etc)  - Arrange for interpreters to assist at discharge as needed  - Consider post-discharge preferences of patient/family/discharge partner  - Complete POLST form as appropriate  - Assess patient's ability to be responsible for managing their own health  - Refer to Case Management Department for coordinating discharge planning if the patient needs post-hospital services based on physician/LIP order or complex needs related to functional status, cognitive ability or social support system  Outcome: Progressing

## 2025-02-13 ENCOUNTER — EXTERNAL FACILITY (OUTPATIENT)
Dept: INTERNAL MEDICINE CLINIC | Facility: CLINIC | Age: 70
End: 2025-02-13

## 2025-02-13 DIAGNOSIS — I10 PRIMARY HYPERTENSION: ICD-10-CM

## 2025-02-13 DIAGNOSIS — N17.9 AKI (ACUTE KIDNEY INJURY): Primary | ICD-10-CM

## 2025-02-13 PROCEDURE — 99305 1ST NF CARE MODERATE MDM 35: CPT | Performed by: INTERNAL MEDICINE

## 2025-02-13 NOTE — PROGRESS NOTES
HPI:   Maximo Diggs Jr. is a(n) 69 year old male with h/o CAD, distal LAD stent on plavix, CHF, mild to mod AR, HTN (on Bystolic, Losartan, Nifedipine, Hydralazine), HL, follows at [facility], temporal arteritis on tapering doses of prednisone (about month ago decr to 5 mg), CKD3, BPH, hypothyroidism, retinopathy, arthritis, on alendronate, severe back pain with lumbar stenosis, associated with weakness, eval for elective surgery recently.Now presents to ER after mechanical fall.  Patient reports not feeling well for few weeks, with upset stomach, poor appetite, nausea and vomiting.  In the ER found elevated creatinine and leukocytosis.  No diarrhea.     History      Past Medical History       Past Medical History:    PIERCE (acute kidney injury) (HCC)    Arthritis    CAD (coronary artery disease)    High cholesterol    Nausea and vomiting, unspecified vomiting type    Neuropathy    Retinal pigmentation    Spine fracture     L3, L4, L5.  Rods placed    Thyroid disease    Ulcer    Unspecified essential hypertension         Past Surgical History         Past Surgical History:   Procedure Laterality Date    Angioplasty (coronary)   2012     with stent placement x 1    Back surgery        Excis jt lining,prox i-p jt,each Right 10/14/2015     Procedure: ARTHROTOMY OF FINGER JOINT INTERPHALANGEAL;  Surgeon: Olvin Vences MD;  Location: [facility]    Excis jt lining,prox i-p jt,each Right 10/14/2015     Procedure: ARTHROTOMY OF FINGER JOINT INTERPHALANGEAL;  Surgeon: Olvin Vences MD;  Location: [facility]    Other surgical history   2000,2002, 2004, 2006     back surgery     Other surgical history   1982     \"ulcer surgery\"    Other surgical history Right 10/14/15     right arthrotomy, debridement, synovectomy of the joints w/biopsy right index and long finger MP joint    Patient documented not to have experienced any of the following events Right 10/14/2015     Procedure: ARTHROTOMY OF FINGER JOINT  INTERPHALANGEAL;  Surgeon: Olvin Vences MD;  Location: [facility]    Patient with preoperative order for iv antibiotic surgical site infect Right 10/14/2015     Procedure: ARTHROTOMY OF FINGER JOINT INTERPHALANGEAL;  Surgeon: Olvin Vences MD;  Location: [facility]         Family History[]Expand by Default         Family History   Problem Relation Age of Onset    Hypertension Father      Cancer Father           prostate cancer    Hypertension Mother      Diabetes Sister           Social History:  Short Social Hx on File   Social History           Socioeconomic History    Marital status:    Tobacco Use    Smoking status: Never    Smokeless tobacco: Never   Vaping Use    Vaping status: Never Used   Substance and Sexual Activity    Alcohol use: No    Drug use: No      Social Drivers of Health           Food Insecurity: No Food Insecurity (1/18/2025)     Food Insecurity      Food Insecurity: Never true   Transportation Needs: No Transportation Needs (1/18/2025)     Transportation Needs      Lack of Transportation: No     [facility]     Social Connections   Housing Stability: Low Risk  (1/18/2025)     Housing Stability      Housing Instability: No         Allergies/Medications:   Allergies: [Allergies]    [Allergies]       Allergen Reactions    Aspirin NAUSEA AND VOMITING    Clavulanic Acid HIVES    Losartan MYALGIA    Penicillins HIVES    Seafood ANAPHYLAXIS    Iodine (Topical) UNKNOWN    Pregabalin OTHER (SEE COMMENTS)    Seasonal     Medication - reviewed        Review of Systems:      he is doing ok, he had diarrhea and vomiting 2 days ago, he is doing better , no more diarrhea , vomiting  Physical Exam:        General:  Alert and oriented.  Looks unwell.  Seen with wife  Diffuse skin problem:  None.  Eye:  Pupils are equal, round and reactive to light, extraocular movements are intact, Normal conjunctiva.  HENT:  Normocephalic,  oral mucosa is dry.  Head:  Normocephalic, atraumatic.  Neck:  Supple, non-tender  Respiratory:  Lungs are clear to auscultation, respirations are non-labored, breath sounds are equal, symmetrical chest wall expansion.  Cardiovascular:  Normal rate, regular rhythm, no murmur, no edema.  Gastrointestinal:  Soft, non-tender, non-distended, normal bowel sounds, no organomegaly.  Musculoskeletal: Normal range of motion.  normal strength.  Feet:  Normal pulses.  Neurologic:  Alert, oriented, lower extremities  Cognition and Speech:  Oriented, speech clear and coherent.  Psychiatric:  Cooperative, appropriate mood & affect.    A/p  PIERCE (acute kidney injury) (HCC) from dehydration on ckd stage 3  improved , monitor bmp          Nausea and vomiting, unspecified vomiting type    GI panel came positive for ETEC  -r/o gastritis/peptic ulcer disease  Ppi , zofran     CT abd (with oral contrast if able to tolerate, no IV) shows prostatomegaly, hepatic steatosis, otherwise no acute changes  -overall improving, advance diet as tolerated       Closed head injury, initial encounter  S/p mechanical fall,       H/o severe back pain with lumbar stenosis, ptot     Leukocytosis, likely reactive  improved       CAD, distal LAD stent on plavix,   CHF, mild to mod AR,     HTN monitor bp, continue with current meds     H/o temporal arteritis on tapering doses of prednisone ( dose decr to 5 mg recently)--->pt feels much mor comfortable on slightly higher dose of 7.5 mg, will continue now, he might need much slower taper, he says he has been on steroids over 4 years , f/u rheumatology outpatient, nurse to schedule  follow up appointment

## 2025-02-17 PROCEDURE — 99308 SBSQ NF CARE LOW MDM 20: CPT | Performed by: INTERNAL MEDICINE

## 2025-02-18 ENCOUNTER — EXTERNAL FACILITY (OUTPATIENT)
Dept: INTERNAL MEDICINE CLINIC | Facility: CLINIC | Age: 70
End: 2025-02-18

## 2025-02-18 ENCOUNTER — INITIAL APN SNF VISIT (OUTPATIENT)
Dept: INTERNAL MEDICINE CLINIC | Facility: SKILLED NURSING FACILITY | Age: 70
End: 2025-02-18

## 2025-02-18 DIAGNOSIS — R11.2 NAUSEA AND VOMITING, UNSPECIFIED VOMITING TYPE: ICD-10-CM

## 2025-02-18 DIAGNOSIS — I10 PRIMARY HYPERTENSION: Primary | ICD-10-CM

## 2025-02-18 DIAGNOSIS — I1A.0 RESISTANT HYPERTENSION: ICD-10-CM

## 2025-02-18 DIAGNOSIS — M54.42 ACUTE LEFT-SIDED LOW BACK PAIN WITH LEFT-SIDED SCIATICA: Primary | ICD-10-CM

## 2025-02-18 DIAGNOSIS — E78.2 MIXED HYPERLIPIDEMIA: ICD-10-CM

## 2025-02-18 PROCEDURE — 1123F ACP DISCUSS/DSCN MKR DOCD: CPT | Performed by: NURSE PRACTITIONER

## 2025-02-18 PROCEDURE — 99306 1ST NF CARE HIGH MDM 50: CPT | Performed by: NURSE PRACTITIONER

## 2025-02-18 NOTE — PROGRESS NOTES
Maximo B Dontae Parikh.  : 1955  Age 69 year old  male patient is admitted to [facility] for rehabilitation and strengthening.      [facility]: [facility] - [facility]    Chief complaint: back pain, stomach pain, weakness, fall    HPI  69 year old male with h/o CAD, distal LAD stent on plavix, CHF, mild to mod AR, HTN (on Bystolic, Losartan, Nifedipine, Hydralazine), HL, follows at [facility], temporal arteritis on tapering doses of prednisone (about month ago decr to 5 mg), CKD3, BPH, hypothyroidism, retinopathy, arthritis, on alendronate, severe back pain with lumbar stenosis, associated with weakness, eval for elective surgery recently.Now presents to ER after mechanical fall.     Patient seen as initial aprn visit, resting in bed. States he had a hard time sleeping for no real reason. Feels ok, but had a upset stomach for a while. No nausea or vomiting but he isn't eating a lot. GI PCR came back negative, afebrile. No pain or tenderness on palpation, BS present, will monitor. VSS    Past Medical History:    PIERCE (acute kidney injury)    Arthritis    CAD (coronary artery disease)    High cholesterol    Nausea and vomiting, unspecified vomiting type    Neuropathy    Retinal pigmentation    Spine fracture    L3, L4, L5.  Rods placed    Thyroid disease    Ulcer    Unspecified essential hypertension     Past Surgical History:   Procedure Laterality Date    Angioplasty (coronary)      with stent placement x 1    Back surgery      Excis jt lining,prox i-p jt,each Right 10/14/2015    Procedure: ARTHROTOMY OF FINGER JOINT INTERPHALANGEAL;  Surgeon: Olvin Vences MD;  Location: [facility]    Excis jt lining,prox i-p jt,each Right 10/14/2015    Procedure: ARTHROTOMY OF FINGER JOINT INTERPHALANGEAL;  Surgeon: Olvin Vences MD;  Location: [facility]    Other surgical history  ,, ,     back surgery     Other surgical history      \"ulcer surgery\"    Other surgical history Right 10/14/15     right arthrotomy, debridement, synovectomy of the joints w/biopsy right index and long finger MP joint    Patient documented not to have experienced any of the following events Right 10/14/2015    Procedure: ARTHROTOMY OF FINGER JOINT INTERPHALANGEAL;  Surgeon: Olvin Vences MD;  Location: [facility]    Patient with preoperative order for iv antibiotic surgical site infect Right 10/14/2015    Procedure: ARTHROTOMY OF FINGER JOINT INTERPHALANGEAL;  Surgeon: Olvin Vences MD;  Location: [facility]     Family History   Problem Relation Age of Onset    Hypertension Father     Cancer Father         prostate cancer    Hypertension Mother     Diabetes Sister      Social History     Socioeconomic History    Marital status:    Tobacco Use    Smoking status: Never    Smokeless tobacco: Never   Vaping Use    Vaping status: Never Used   Substance and Sexual Activity    Alcohol use: No    Drug use: No     Social Drivers of Health     Food Insecurity: No Food Insecurity (1/18/2025)    Food Insecurity     Food Insecurity: Never true   Transportation Needs: No Transportation Needs (1/18/2025)    Transportation Needs     Lack of Transportation: No   Housing Stability: Low Risk  (1/18/2025)    Housing Stability     Housing Instability: No       IMMUNIZATIONS  Immunization History   Administered Date(s) Administered    Covid-19 Vaccine Pfizer 30 mcg/0.3 ml 03/19/2021, 04/09/2021, 11/06/2021    Covid-19 Vaccine Pfizer Bivalent 30mcg/0.3mL 12/08/2022        ALLERGIES:  Allergies[1]    CODE STATUS:  Full Code    ADVANCED CARE PLANNING TEAM: Possible discharge 2/22/25      CURRENT MEDICATIONS - reviewed and updated on SNF EMAR       SUBJECTIVE    Patient seen as initial aprn visit, resting in bed. States he had a hard time sleeping for no real reason. Feels ok, but had a upset stomach for a while. No nausea or vomiting but he isn't eating a lot. GI PCR came back negative, afebrile. No pain or tenderness on palpation,  BS present, will monitor. VSS      PHYSICAL EXAM:  VITALS: /73 HR 83, RR 18 SPO2 97% on room air  GENERAL HEALTH:well developed, well nourished, in no apparent distress   LINES, TUBES, DRAINS:  none  SKIN: no rashes, no suspicious lesions, warm, dry  WOUND: see wound assessment,   EYES: PERRLA, EOMI, sclera anicteric, conjunctiva normal; there is no nystagmus, no drainage from eyes  HENT: normocephalic; normal nose, no nasal drainage, mucous membranes pink, moist, pharynx no exudate, no visible cerumen.   NECK:supple, FROM; no JVD, no TMG, no carotid bruits   BREAST: deferred exam   RESPIRATORY:clear to percussion and auscultation  CARDIOVASCULAR: S1, S2 normal, RRR; no S3, no S4 and no murmur  ABDOMEN:  normal active BS+, soft, nondistended; no organomegaly, no masses; no bruits; nontender, no guarding, no rebound tenderness.  :Deferred  LYMPHATIC:no lymphedema  MUSCULOSKELETAL: no acute synovitis upper or lower extremity   EXTREMITIES/VASCULAR:no cyanosis, clubbing or edema  NEUROLOGIC:intact; no sensorimotor deficit and follows commands  PSYCHIATRIC: alert and oriented x 3; affect appropriate       LABS/DIAGNOSTICS REVIEWED AT THIS VISIT:  Labs 2/14/25  wbc 9.58 Hgb 7.0 Cr 1.19 bun 12  K 5.4 albumin 3.2    CDiff 2/17/25 negative  GI PCR panel 2/17/25 negative    Flu, RSV 2/12/25 negative    SEE PLAN BELOW  Back pain   - continue   - continue Norco 5-325 Q6PRN  - continue Tylenol 650mg Q6PRN  - continue Cyclobenzaprine 5mg Q8PRN     Nausea/Vomiting/Diarrhea  - Flu A/B, RSV negative  - Cdiff negative  - GI PCR negative  - continue zofran 4mg Q6PRN  - continue loperamin 2mg Q6PRN  - continue     HTN  - Qshift vitals  - continue NIFEdipine ER60mg daily   - continue hydrALAZINE HCL 100mg BID  - continue Bisoprolol 10mg BID  - continue lasix 20mg daily   - monitor     Hypothyroidism  - continue Levothyroxine  25mg daily  - monitor     HLD  - continue zetia 10mg daily   - continue atorvastatin 40mg HS  -  monitor     GERD  - continue nexium 40mg HS  - continue Famotidine 40mg daily   - monitor     BPH  - continue alfuzosin 10mg daily  - monitor     Supplements  - continue Coenzyme Q-10 daily  - continue Vitamin D3  - continue     LABS  - CBC and bmp weekly and PRN    Therapy  Baseline: Independent   Current: 2WW supervision  - has walker    Discharge Planning  - SW following  - lives at home, 8 stairs  - patient wants to go home      FOLLOW UP APPOINTMENTS    FOLLOW UP APPOINTMENT AT 2.45 PM AYDEN 12, [address] [place] [address] MD WITH ANDREE NANCE MD HEART AND VASCULAR [address] [place] [address]    Follow up with Dr. Casey Benton (neurosx) on 2/24/25    Follow up with Dr. Kamari Nance (cardio) in 1 month. [address] [place] [address], [place] [address], [place].  - to be scheduled    Follow up with Dr. Lito Pedroza (internal medicine) in 2 weeks. - to be scheduled    This is a 60 minute visit and greater than 50% of the time was spent counseling the patient and/or coordinating care. Patient is a full code    Note to patient: The 21st Century Cures Act makes medical notes like these available to patients in the interest of transparency. However, this is a medical document intended as peer to peer communication. It is written in medical language and may contain abbreviations or verbiage that are unfamiliar. It may appear blunt or direct. Medical documents are intended to carry relevant information, facts as evident, and the clinical opinion of the practitioner who signs the document.     Candi Arnold, APRN  02/18/25         [1]   Allergies  Allergen Reactions    Aspirin NAUSEA AND VOMITING    Clavulanic Acid HIVES    Losartan MYALGIA    Penicillins HIVES    Seafood ANAPHYLAXIS    Iodine (Topical) UNKNOWN    Pregabalin OTHER (SEE COMMENTS)    Seasonal

## 2025-02-18 NOTE — PROGRESS NOTES
follow up    seen 2/17/2025      Past Medical History:   PIERCE (acute kidney injury) (HCC)   Arthritis   CAD (coronary artery disease)   High cholesterol   Nausea and vomiting, unspecified vomiting type   Neuropathy   Retinal pigmentation   Spine fracture   L3, L4, L5. Rods placed   Thyroid disease   Ulcer   Unspecified essential hypertension    Past Surgical History    Past Surgical History:  Procedure Laterality Date   Angioplasty (coronary) 2012   with stent placement x 1   Back surgery   Excis jt lining,prox i-p jt,each Right 10/14/2015   Procedure: ARTHROTOMY OF FINGER JOINT INTERPHALANGEAL; Surgeon: Olvin Vences MD; Location: [facility]   Excis jt lining,prox i-p jt,each Right 10/14/2015   Procedure: ARTHROTOMY OF FINGER JOINT INTERPHALANGEAL; Surgeon: Olvin Vences MD; Location: [facility]   Other surgical history 2000,2002, 2004, 2006   back surgery   Other surgical history 1982   \"ulcer surgery\"   Other surgical history Right 10/14/15   right arthrotomy, debridement, synovectomy of the joints w/biopsy right index and long finger MP joint   Patient documented not to have experienced any of the following events Right 10/14/2015   Procedure: ARTHROTOMY OF FINGER JOINT INTERPHALANGEAL; Surgeon: Olvin Vences MD; Location: [facility]   Patient with preoperative order for iv antibiotic surgical site infect Right 10/14/2015   Procedure: ARTHROTOMY OF FINGER JOINT INTERPHALANGEAL; Surgeon: Olvin Vences MD; Location: [facility]    Family HistoryExpand by Default    Family History  Problem Relation Age of Onset   Hypertension Father   Cancer Father   prostate cancer   Hypertension Mother   Diabetes Sister        Socioeconomic History   Marital status:   Tobacco Use   Smoking status: Never   Smokeless tobacco: Never  Vaping Use   Vaping status: Never Used  Substance and Sexual Activity   Alcohol use: No   Drug use: No    Social Drivers of Health    Food Insecurity: No Food Insecurity  (1/18/2025)   Food Insecurity    Food Insecurity: Never true  Transportation Needs: No Transportation Needs (1/18/2025)   Transportation Needs    Lack of Transportation: No   [facility]   Social Connections  Housing Stability: Low Risk (1/18/2025)   Housing Stability    Housing Instability: No    Allergies/Medications:  Allergies: [Allergies]    [Allergies]    Allergen Reactions   Aspirin NAUSEA AND VOMITING   Clavulanic Acid HIVES   Losartan MYALGIA   Penicillins HIVES   Seafood ANAPHYLAXIS   Iodine (Topical) UNKNOWN   Pregabalin OTHER (SEE COMMENTS)   Seasonal  Medication - reviewed    Review of Systems:    ros: he is doing  ok, no vomiting or diarrhea ,   Physical Exam:    General: Alert and oriented. Looks unwell. Seen with wife  Diffuse skin problem: None.  Eye: Pupils are equal, round and reactive to light, extraocular movements are intact, Normal conjunctiva.  HENT: Normocephalic, oral mucosa is dry.  Head: Normocephalic, atraumatic.  Neck: Supple, non-tender  Respiratory: Lungs are clear to auscultation, respirations are non-labored, breath sounds are equal, symmetrical chest wall expansion.  Cardiovascular: Normal rate, regular rhythm, no murmur, no edema.  Gastrointestinal: Soft, non-tender, non-distended, normal bowel sounds, no organomegaly.  Musculoskeletal: Normal range of motion. normal strength.  Feet: Normal pulses.  Neurologic: Alert, oriented, lower extremities  Cognition and Speech: Oriented, speech clear and coherent.  Psychiatric: Cooperative, appropriate mood & affect.    A/p      PLAN: vomiting /diarrhea - resolved, pierce resolved, htn controlled             PIERCE (acute kidney injury) (HCC) from dehydration on ckd stage 3 improved , monitor bmp, continue with ptot                Nausea and vomiting, unspecified vomiting type   GI panel came positive for ETEC  -r/o gastritis/peptic ulcer disease  Ppi , zofran    CT abd (with oral  contrast if able to tolerate, no IV) shows prostatomegaly, hepatic steatosis, otherwise no acute changes  -overall improving, advance diet as tolerated    Closed head injury, initial encounter  S/p mechanical fall,    H/o severe back pain with lumbar stenosis, ptot    Leukocytosis, likely reactive  improved    CAD, distal LAD stent on plavix,  CHF, mild to mod AR,    HTN monitor bp, continue with current meds    H/o temporal arteritis on tapering doses of prednisone ( dose decr to 5 mg recently)--->pt feels much mor comfortable on slightly higher dose of 7.5 mg, will continue now, he might need much slower taper, he says he has been on steroids over 4 years , f/u rheumatology outpatient, nurse to schedule follow up appointment

## 2025-02-21 ENCOUNTER — SNF DISCHARGE (OUTPATIENT)
Dept: INTERNAL MEDICINE CLINIC | Facility: SKILLED NURSING FACILITY | Age: 70
End: 2025-02-21

## 2025-02-21 ENCOUNTER — EXTERNAL FACILITY (OUTPATIENT)
Dept: INTERNAL MEDICINE CLINIC | Facility: CLINIC | Age: 70
End: 2025-02-21

## 2025-02-21 DIAGNOSIS — R11.2 NAUSEA AND VOMITING, UNSPECIFIED VOMITING TYPE: ICD-10-CM

## 2025-02-21 DIAGNOSIS — I10 PRIMARY HYPERTENSION: Primary | ICD-10-CM

## 2025-02-21 DIAGNOSIS — Z87.39 H/O TEMPORAL ARTERITIS: ICD-10-CM

## 2025-02-21 DIAGNOSIS — M54.6 ACUTE BILATERAL THORACIC BACK PAIN: Primary | ICD-10-CM

## 2025-02-21 DIAGNOSIS — E78.2 MIXED HYPERLIPIDEMIA: ICD-10-CM

## 2025-02-21 DIAGNOSIS — E03.9 HYPOTHYROIDISM, UNSPECIFIED TYPE: ICD-10-CM

## 2025-02-21 DIAGNOSIS — I10 PRIMARY HYPERTENSION: ICD-10-CM

## 2025-02-21 DIAGNOSIS — N17.9 AKI (ACUTE KIDNEY INJURY): ICD-10-CM

## 2025-02-21 DIAGNOSIS — K21.9 GASTROESOPHAGEAL REFLUX DISEASE WITHOUT ESOPHAGITIS: ICD-10-CM

## 2025-02-21 DIAGNOSIS — N40.0 BENIGN PROSTATIC HYPERPLASIA WITHOUT LOWER URINARY TRACT SYMPTOMS: ICD-10-CM

## 2025-02-21 NOTE — PROGRESS NOTES
Maximo Diggs Jr.  8/16/1955  Date of Admission to [facility]: 1/18/25  Date of Hospital Discharge: 1/22/25  Hospital Discharge Diagnosis: back pain, stomach pain, weakness, fall  Date of Admission to [facility]: 1/22/25  Date of Discharge from SNF: 2/22/25  Skilled nursing facility attending: Dr Marybel Pena   Primary Care Physician: Lito Pedroza MD  Assessment & Plan  Acute bilateral thoracic back pain  - continue Norco 5-325 Q6PRN  - continue Tylenol 650mg Q6PRN  - continue Cyclobenzaprine 5mg Q8PRN   - monitor   Nausea and vomiting, unspecified vomiting type  - Flu A/B, RSV negative  - Cdiff negative  - GI PCR negative  - continue zofran 4mg Q6PRN  - continue loperamin 2mg Q6PRN  - monitor  Primary hypertension  - Qshift vitals  - continue NIFEdipine ER60mg daily   - continue hydrALAZINE HCL 100mg BID  - continue Bisoprolol 10mg BID  - continue lasix 20mg daily   - monitor   Hypothyroidism, unspecified type  - continue Levothyroxine  25mg daily  - monitor   Mixed hyperlipidemia  - continue zetia 10mg daily   - continue atorvastatin 40mg HS  - monitor   Gastroesophageal reflux disease without esophagitis  - continue nexium 40mg HS  - continue Famotidine 40mg daily   - monitor   Benign prostatic hyperplasia without lower urinary tract symptoms  - continue alfuzosin 10mg daily  - monitor     Code Status: Full Code  Discharge to: Home with unsure Home Health  Home Health Services ordered: Occupational Therapy and Physical Therapy  Mobility assessment: Ambulates independently    Reliable Support: spouse / significant other  Diet:Regular diet and Thin liquids  Forms provided today:None  [place] for Health Care  {TIP  Document your goals of care discussion above this blue text and it will appear in the  ACP Notes section of the ACP Navigator activity tab for viewing across the continuum of care.     The ACP Note is intended to document conversations that take place over time.  The key elements to  include in the discussion are  Whether a Power of  for Health Care (POA) has been identified  If a code status conversation happened, document the details  If goals of care and prognostic awareness were discussed, document the details  This is a conversation that advances and changes over time based on the patient's health status and goals of care.  If you spend 16 minutes or more, document the time spent using the SmartList below and add the appropriate E/M code to the visit.   Face to Face time spent: 30 minutes  {Power of  for Health Care Form (English), Power of  for Health Care Form (Burmese), POLST Form    Future Appts 2/21/2025 - 5/22/2025      None          Subjective    Hospital Summary:  69 year old male with h/o CAD, distal LAD stent on plavix, CHF, mild to mod AR, HTN (on Bystolic, Losartan, Nifedipine, Hydralazine), HL, follows at [facility], temporal arteritis on tapering doses of prednisone (about month ago decr to 5 mg), CKD3, BPH, hypothyroidism, retinopathy, arthritis, on alendronate, severe back pain with lumbar stenosis, associated with weakness, eval for elective surgery recently.Now presents to ER after mechanical fall.     Skilled Nursing Facility Summary:  Patient came to [facility] for physical therapy after fall and back pain. No fracture were identified. Patient was Moderately independent at home. He is using a walker at rehab and leaves [facility]. He is pleasant, AOx3, no on O2    Medication Changes During skilled nursing facility stay:     Medication List            Accurate as of February 21, 2025  3:47 PM. If you have any questions, ask your nurse or doctor.                CONTINUE taking these medications      alendronate 70 MG Tabs  Commonly known as: Fosamax     alfuzosin ER 10 MG Tb24  Commonly known as: Uroxatral ER     bisoprolol 5 MG Tabs  Commonly known as: Zebeta     cholecalciferol 25 MCG (1000 UT) Tabs  Commonly known as: Vitamin D3     clopidogrel 75 MG  Tabs  Commonly known as: Plavix     CoQ10 100 MG Caps     cyclobenzaprine 10 MG Tabs  Commonly known as: Flexeril     Esomeprazole Magnesium 20 MG Cpdr  Commonly known as: NEXIUM     ezetimibe 10 MG Tabs  Commonly known as: Zetia     famotidine 40 MG Tabs  Commonly known as: Pepcid     hydrALAZINE 100 MG Tabs  Commonly known as: Apresoline     HYDROcodone-acetaminophen 5-325 MG Tabs  Commonly known as: Norco  Take 1 tablet by mouth every 6 (six) hours as needed for Pain.     levothyroxine 25 MCG Tabs  Commonly known as: Synthroid     NIFEdipine ER 60 MG Tb24  Commonly known as: Procardia-XL     ondansetron 4 MG Tbdp  Commonly known as: Zofran-ODT  Take 1 tablet (4 mg total) by mouth every 8 (eight) hours as needed for Nausea.     predniSONE 5 MG Tabs  Commonly known as: Deltasone  Take 1.5 tablets (7.5 mg total) by mouth daily.                 Objective    Vitals  VITALS: /73 HR 83, RR 18 SPO2 97% on room air     Physical Exam  HENT:      Mouth/Throat:      Mouth: Mucous membranes are moist.   Eyes:      Pupils: Pupils are equal, round, and reactive to light.   Cardiovascular:      Rate and Rhythm: Normal rate.   Pulmonary:      Effort: Pulmonary effort is normal.      Breath sounds: Normal breath sounds.   Abdominal:      General: Abdomen is flat. Bowel sounds are normal.      Palpations: Abdomen is soft.   Musculoskeletal:         General: Normal range of motion.      Cervical back: Normal range of motion.   Skin:     General: Skin is warm and dry.   Neurological:      General: No focal deficit present.      Mental Status: He is alert and oriented to person, place, and time.   Psychiatric:         Mood and Affect: Mood normal.      Most Recent Labs:  Lab Results   Component Value Date    WBC 10.5 02/12/2025    RBC 3.17 (L) 02/12/2025    HGB 7.6 (L) 02/12/2025    HCT 24.2 (L) 02/12/2025    .0 02/12/2025     Lab Results   Component Value Date    GLU 52 (L) 02/12/2025     02/12/2025    K 4.1  02/12/2025     02/12/2025    CO2 20.0 (L) 02/12/2025    BUN 14 02/12/2025    CA 7.6 (L) 02/12/2025       Time Spent: 45 minutes.Patient has medication home packet   This includes but is not limited to direct patient care, reviewing this patient's vitals, labs and imaging studies, discussing in multidisciplinary rounds and with other members of the health care team as well as updating family members.

## 2025-02-21 NOTE — ASSESSMENT & PLAN NOTE
- Flu A/B, RSV negative  - Cdiff negative  - GI PCR negative  - continue zofran 4mg Q6PRN  - continue loperamin 2mg Q6PRN  - monitor   no

## 2025-02-21 NOTE — PROGRESS NOTES
follow up   seen 2/20/2025    Past Medical History:   PIERCE (acute kidney injury) (HCC)   Arthritis   CAD (coronary artery disease)   High cholesterol   Nausea and vomiting, unspecified vomiting type   Neuropathy   Retinal pigmentation   Spine fracture   L3, L4, L5. Rods placed   Thyroid disease   Ulcer   Unspecified essential hypertension    Past Surgical History    Past Surgical History:  Procedure Laterality Date   Angioplasty (coronary) 2012   with stent placement x 1   Back surgery   Excis jt lining,prox i-p jt,each Right 10/14/2015   Procedure: ARTHROTOMY OF FINGER JOINT INTERPHALANGEAL; Surgeon: Olvin Vences MD; Location: [facility]   Excis jt lining,prox i-p jt,each Right 10/14/2015   Procedure: ARTHROTOMY OF FINGER JOINT INTERPHALANGEAL; Surgeon: Olvin Vences MD; Location: [facility]   Other surgical history 2000,2002, 2004, 2006   back surgery   Other surgical history 1982   \"ulcer surgery\"   Other surgical history Right 10/14/15   right arthrotomy, debridement, synovectomy of the joints w/biopsy right index and long finger MP joint   Patient documented not to have experienced any of the following events Right 10/14/2015   Procedure: ARTHROTOMY OF FINGER JOINT INTERPHALANGEAL; Surgeon: Olvin Vences MD; Location: [facility]   Patient with preoperative order for iv antibiotic surgical site infect Right 10/14/2015   Procedure: ARTHROTOMY OF FINGER JOINT INTERPHALANGEAL; Surgeon: Olvin Vences MD; Location: [facility]        Family History  Problem Relation Age of Onset   Hypertension Father   Cancer Father   prostate cancer   Hypertension Mother   Diabetes Sister    Socioeconomic History   Marital status:   Tobacco Use   Smoking status: Never   Smokeless tobacco: Never  Vaping Use   Vaping status: Never Used  Substance and Sexual Activity   Alcohol use: No   Drug use: No    Social Drivers of Health    Food Insecurity: No Food Insecurity (1/18/2025)   Food Insecurity    Food  Insecurity: Never true  Transportation Needs: No Transportation Needs (1/18/2025)   Transportation Needs    Lack of Transportation: No   Received from [facility] [place] [place], [facility] [place] [place]   Social Connections  Housing Stability: Low Risk (1/18/2025)   Housing Stability    Housing Instability: No        Allergen Reactions   Aspirin NAUSEA AND VOMITING   Clavulanic Acid HIVES   Losartan MYALGIA   Penicillins HIVES   Seafood ANAPHYLAXIS   Iodine (Topical) UNKNOWN   Pregabalin OTHER (SEE COMMENTS)   Seasonal  Medication - reviewed    Review of Systems:    ros: he is doing ok, has no complains , feels stronger  Physical Exam:      Diffuse skin problem: None.  Eye: Pupils are equal, round and reactive to light, extraocular movements are intact, Normal conjunctiva.  HENT: Normocephalic, oral mucosa is dry.  Head: Normocephalic, atraumatic.  Neck: Supple, non-tender  Respiratory: Lungs are clear to auscultation, respirations are non-labored, breath sounds are equal, symmetrical chest wall expansion.  Cardiovascular: Normal rate, regular rhythm, no murmur, no edema.  Gastrointestinal: Soft, non-tender, non-distended, normal bowel sounds, no organomegaly.  Musculoskeletal: Normal range of motion. normal strength.  Feet: Normal pulses.  Neurologic: Alert, oriented, lower extremities  Cognition and Speech: Oriented, speech clear and coherent.  Psychiatric: Cooperative, appropriate mood & affect.    A/p    PLAN: , pierce resolved, htn controlled, continue with current meds, temporal arteritis - on tapering steroids,cad stable               PIERCE (acute kidney injury) (HCC) from dehydration on ckd stage 3 improved , monitor bmp, continue with ptot    Nausea and vomiting, unspecified vomiting type   GI panel came positive for ETEC  -r/o gastritis/peptic ulcer disease  Ppi , zofran    CT abd (with oral contrast if able to tolerate, no IV) shows prostatomegaly, hepatic steatosis, otherwise no acute  changes  -overall improving, advance diet as tolerated    Closed head injury, initial encounter  S/p mechanical fall,    H/o severe back pain with lumbar stenosis, ptot    Leukocytosis, likely reactive  improved    CAD, distal LAD stent on plavix,  CHF, mild to mod AR,    HTN monitor bp, continue with current meds    H/o temporal arteritis on tapering doses of prednisone ( dose decr to 5 mg recently)--->pt feels much mor comfortable on slightly higher dose of 7.5 mg, will continue now, he might need much slower taper, he says he has been on steroids over 4 years , f/u rheumatology outpatient, nurse to schedule follow up appointment

## (undated) NOTE — ED AVS SNAPSHOT
[facility] Emergency Department  [place] [address] [place] [address]  Phone:  [phone]  Fax:  [phone].    MRN: [id number]    Department:  [facility] Emergency Department   Date of Visit:  7/25/20 and Class Registration line at [phone] or find a doctor online by visiting [email or web address].    IF THERE IS ANY CHANGE OR WORSENING OF YOUR CONDITION, CALL YOUR PRIMARY CARE PHYSICIAN AT ONCE OR RETURN IMMEDIATELY TO [address] [place] [address].     If

## (undated) NOTE — IP AVS SNAPSHOT
Patient Demographics     Address  [address], [place] [address] Phone  [phone] (Home)  [phone] (Mobile) *Preferred*      Patient Contacts     Name Relation Home Work Mobile    Heavenly Diggs Spouse [phone]      Allergies as of 3/13/2024  Review status set to In Progress on 3/9/2024       Noted Reaction Type Reactions    Aspirin 04/15/2020    NAUSEA AND VOMITING    Clavulanic Acid 12/31/2008   Systemic HIVES    Losartan 12/05/2023   Systemic MYALGIA    Penicillins 03/15/2006   Systemic HIVES    DELETED: Potassium 12/05/2023   Systemic HIVES    Per patient- not an allergy    Seafood 03/14/2013    ANAPHYLAXIS    DELETED: Hydralazine 08/08/2020   Systemic UNKNOWN    Iodine (topical) 03/15/2006   Systemic UNKNOWN    Pregabalin 12/05/2023   Systemic OTHER (SEE COMMENTS)    Seasonal 10/07/2015          Code Status Information     Code Status    Full Code      Patient Instructions    None      Follow-up Information     Lito Pedroza MD Follow up in 1 week(s).    Specialty: Internal Medicine  Contact information:  [address]  [address]  [facility] [address] [phone]                        Your Home Meds List      TAKE these medications       Instructions Authorizing Provider Morning Afternoon Evening As Needed   alendronate 70 MG Tabs  Commonly known as: Fosamax  Next dose due: Sunday      Take 1 tablet (70 mg total) by mouth once a week. On SUNDAYS only          alfuzosin ER 10 MG Tb24  Commonly known as: Uroxatral ER  Next dose due: Tomorrow       Take 1 tablet (10 mg total) by mouth daily.          bisoprolol 5 MG Tabs  Commonly known as: Zebeta  Next dose due: At bedtime       Take 1 tablet (5 mg total) by mouth in the morning and 1 tablet (5 mg total) before bedtime.          cholecalciferol 25 MCG (1000 UT) Tabs  Next dose due: Tomorrow      Take 1 tablet (1,000 Units total) by mouth daily.          clopidogrel 75 MG Tabs  Commonly known as: Plavix  Next dose due: Tomorrow        Take 1 tablet (75 mg total) by mouth daily.          CoQ10 100 MG Caps  Next dose due: Before bedtime       Take 100 mg by mouth in the morning and 100 mg before bedtime.          cyclobenzaprine 10 MG Tabs  Commonly known as: Flexeril      Take 1 tablet (10 mg total) by mouth 3 (three) times daily as needed for Muscle spasms.          Esomeprazole Magnesium 20 MG Cpdr  Commonly known as: NEXIUM  Next dose due: Tomorrow       Take 1 capsule (20 mg total) by mouth every morning before breakfast.          hydrALAZINE 100 MG Tabs  Commonly known as: Apresoline      Take 1 tablet (100 mg total) by mouth every 8 (eight) hours.   Sampson Laftami         HYDROcodone-acetaminophen 5-325 MG Tabs  Commonly known as: Norco      Take 1 tablet by mouth every 6 (six) hours as needed for Pain.   Geoff Mckeon         levothyroxine 25 MCG Tabs  Commonly known as: Synthroid  Next dose due: Tomorrow       Take 1 tablet (25 mcg total) by mouth before breakfast.          NIFEdipine ER 60 MG Tb24  Commonly known as: Procardia-XL  Next dose due: Before bedtime       Take 1 tablet (60 mg total) by mouth in the morning and 1 tablet (60 mg total) before bedtime.          ondansetron 4 MG Tbdp  Commonly known as: Zofran-ODT      Take 1 tablet (4 mg total) by mouth every 8 (eight) hours as needed for Nausea.   Pérez Wang         predniSONE 10 MG Tabs  Commonly known as: Deltasone  Next dose due: This evening       Take 2 tablets (20 mg total) by mouth 2 (two) times daily.                Where to Get Your Medications      Please  your prescriptions at the location directed by your doctor or nurse    Bring a paper prescription for each of these medications  HYDROcodone-acetaminophen 5-325 MG Tabs           [phone] - MAR ACTION REPORT  (last 48 hrs)    ** SITE UNKNOWN **     Order ID Medication Name Action Time Action Reason Comments    [id number] HYDROcodone-acetaminophen (Norco) 5-325 MG per tab 2 tablet (Or Linked Group  #1) 03/11/24 2103 Given      [id number] HYDROcodone-acetaminophen (Norco) 5-325 MG per tab 2 tablet 03/12/24 1414 Given      [id number] HYDROcodone-acetaminophen (Norco) 5-325 MG per tab 2 tablet 03/12/24 2226 Given      [id number] HYDROcodone-acetaminophen (Norco) 5-325 MG per tab 2 tablet 03/13/24 1032 Given      [id number] HYDROcodone-acetaminophen (Norco) 5-325 MG per tab 2 tablet 03/13/24 1443 Given      [id number] NIFEdipine ER (Procardia-XL) 24 hr tab 60 mg 03/11/24 2103 Given      [id number] NIFEdipine ER (Procardia-XL) 24 hr tab 60 mg 03/12/24 0835 Given      [id number] NIFEdipine ER (Procardia-XL) 24 hr tab 60 mg 03/12/24 2222 Given      [id number] NIFEdipine ER (Procardia-XL) 24 hr tab 60 mg 03/13/24 0854 Given      [id number] carvedilol (Coreg) tab 12.5 mg 03/11/24 1730 Given      [id number] carvedilol (Coreg) tab 12.5 mg 03/12/24 0835 Given      [id number] carvedilol (Coreg) tab 12.5 mg 03/12/24 1716 Given      [id number] carvedilol (Coreg) tab 12.5 mg 03/13/24 0854 Given      [id number] clopidogrel (Plavix) tab 75 mg 03/12/24 0835 Given      [id number] clopidogrel (Plavix) tab 75 mg 03/13/24 0854 Given      [id number] hydrALAZINE (Apresoline) tab 100 mg 03/11/24 2104 Given      [id number] hydrALAZINE (Apresoline) tab 100 mg 03/12/24 0541 Given      [id number] hydrALAZINE (Apresoline) tab 100 mg 03/12/24 1322 Given      [id number] hydrALAZINE (Apresoline) tab 100 mg 03/12/24 2222 Given      [id number] hydrALAZINE (Apresoline) tab 100 mg 03/13/24 0547 Given      [id number] hydrALAZINE (Apresoline) tab 100 mg 03/13/24 1436 Given      [id number] hydrALAzine (Apresoline) 20 mg/mL injection 10 mg 03/12/24 0546 Given      [id number] levothyroxine (Synthroid) tab 25 mcg 03/12/24 0541 Given      [id number] levothyroxine (Synthroid) tab 25 mcg 03/13/24 0547 Given      [id number] morphINE PF 4 MG/ML injection 4 mg (Or Linked Group #2) 03/12/24 1716 Given      [id number] pantoprazole (Protonix) DR tab 40 mg 03/12/24 0541 Given       [id number] pantoprazole (Protonix) DR tab 40 mg 03/13/24 0547 Given      [id number] predniSONE (Deltasone) tab 20 mg 03/11/24 2104 Given      [id number] predniSONE (Deltasone) tab 20 mg 03/12/24 0835 Given      [id number] predniSONE (Deltasone) tab 20 mg 03/12/24 2222 Given      [id number] predniSONE (Deltasone) tab 20 mg 03/13/24 0854 Given      [id number] tamsulosin (Flomax) cap 0.4 mg 03/12/24 0541 Given      [id number] tamsulosin (Flomax) cap 0.4 mg 03/13/24 0547 Given            LEFT UPPER ARM     Order ID Medication Name Action Time Action Reason Comments    [id number] heparin (Porcine) 5000 UNIT/ML injection 5,000 Units 03/11/24 2104 Given      [id number] heparin (Porcine) 5000 UNIT/ML injection 5,000 Units 03/12/24 0541 Given      [id number] heparin (Porcine) 5000 UNIT/ML injection 5,000 Units 03/13/24 0546 Given            RIGHT UPPER ARM     Order ID Medication Name Action Time Action Reason Comments    [id number] heparin (Porcine) 5000 UNIT/ML injection 5,000 Units 03/12/24 1322 Given      [id number] heparin (Porcine) 5000 UNIT/ML injection 5,000 Units 03/12/24 2222 Given              Recent Vital Signs    Flowsheet Row Most Recent Value   /50 Filed at 03/13/2024 1434   Pulse 90 Filed at 03/13/2024 1434   Resp 18 Filed at 03/13/2024 1434   Temp 98.6 °F (37 °C) Filed at 03/13/2024 1434   SpO2 96 % Filed at 03/13/2024 1434      Patient's Most Recent Weight    Flowsheet Row Most Recent Value   Patient Weight 65.9 kg (145 lb 4.8 oz)         Lab Results Last 24 Hours      CBC With Differential With Platelet [[id number]] (Abnormal)  Resulted: 03/13/24 0615, Result status: Final result   Ordering provider: Aixa Orr MD  03/12/24 2300 Resulting lab: [facility] ([facility])   Narrative:  The following orders were created for panel order CBC With Differential With Platelet.  Procedure                               Abnormality         Status                     ---------                                -----------         ------                     CBC W/ DIFFERENTIAL[[id number]]          Abnormal            Final result                 Please view results for these tests on the individual orders.    Specimen Information    Type Source Collected On   Blood — 03/13/24 0501            Manual differential [[id number]] (Abnormal)  Resulted: 03/13/24 0615, Result status: Final result   Ordering provider: Aixa Orr MD  03/13/24 0526 Resulting lab: [facility] ([facility]    Specimen Information    Type Source Collected On   Blood — 03/13/24 0501          Components    Component Value Reference Range Flag Lab   Neutrophil Absolute Manual 11.00 1.50 - 7.70 x10(3) uL H [facility])   Lymphocyte Absolute Manual 1.00 1.00 - 4.00 x10(3) uL — [facility])   Monocyte Absolute Manual 0.38 0.10 - 1.00 x10(3) uL — [facility])   Eosinophil Absolute Manual 0.00 0.00 - 0.70 x10(3) uL — [facility])   Basophil Absolute Manual 0.00 0.00 - 0.20 x10(3) uL — [facility])   Metamyelocyte Absolute Manual 0.13 0 x10(3) uL H [facility] ([facility])   Neutrophils % Manual 87 % — [facility])   Band % 1 % — [facility])   Lymphocyte % Manual 8 % — [facility])   Monocyte % Manual 3 % — [facility])   Eosinophil % Manual 0 % — [facility])   Basophil % Manual 0 % — [facility] ([facility])   Metamyelocyte % 1 % — [facility])   Total Cells Counted 100 — — [facility])   RBC Morphology See morphology below Normal, Slide reviewed, see previous RBC morphology. A [facility])   Platelet Morphology Normal Normal — [facility])   Macrocytosis 1+ — — [facility])   Microcytosis 1+ — — [facility])   Polychromasia 1+ — — [facility] ([facility])            Comp Metabolic Panel (14) [[id number]] (Abnormal)  Resulted: 03/13/24 0556, Result status: Final result   Ordering provider: Aixa Orr MD  03/12/24 2300 Resulting lab: [facility]  ([facility])    Specimen Information    Type Source Collected On   Blood — 03/13/24 0501          Components    Component Value Reference Range Flag Lab   Glucose 126 70 - 99 mg/dL H [facility] ([facility])   Sodium 136 136 - 145 mmol/L — [facility])   Potassium 4.7 3.5 - 5.1 mmol/L — [facility])   Chloride 107 98 - 112 mmol/L — [facility])   CO2 22.0 21.0 - 32.0 mmol/L — [facility])   Anion Gap 7 0 - 18 mmol/L — [facility])   BUN 35 9 - 23 mg/dL H [facility] ([facility])   Creatinine 1.43 0.70 - 1.30 mg/dL H [facility] ([facility])   BUN/CREA Ratio 24.5 10.0 - 20.0 H [facility])   Calcium, Total 8.5 8.7 - 10.4 mg/dL L [facility])   Calculated Osmolality 292 275 - 295 mOsm/kg — [facility])   eGFR-Cr 54 >=60 mL/min/1.73m2 L [facility])   ALT 46 10 - 49 U/L — [facility] ([facility])   AST 38 <=34 U/L H [facility] ([facility])   Alkaline Phosphatase 31 45 - 117 U/L L [facility])   Bilirubin, Total 0.4 0.2 - 1.1 mg/dL — [facility])   Total Protein 7.4 5.7 - 8.2 g/dL — [facility])   Albumin 3.4 3.2 - 4.8 g/dL — [facility])   Globulin  4.0 2.8 - 4.4 g/dL — [facility])   A/G Ratio 0.9 1.0 - 2.0 L [facility])            Testing Performed By     Lab - Abbreviation Name Director Address Valid Date Range    162 - [facility]) [facility] ([facility]) Chris Conn M.D. [address] [facility] [address] 03/19/20 1442 - Present            Microbiology Results (All)     None      Pending Labs     Order Current Status    Aldosterone/Renin Ratio In process    Metanephrines, Plasma Free In process         H&P - H&P Note      H&P signed by Tawanda Felix MD at 3/9/2024 10:04 PM  Version 1 of 1    Author: Tawanda Felix MD Service: — Author Type: Physician    Filed: 3/9/2024 10:04 PM Date of Service: 3/9/2024  7:32 PM Status: Signed    : Tawanda Felix MD (Physician)       [place]  [facility]  part of [facility]  HISTORY AND PHYSICAL       Maximo Diggs Jr. Patient Status:  Emergency    1955  68 year old CSN [id number]   Location 4343 Attending Milton Amado MD     PCP Lito Pedroza MD         DATE OF ADMISSION: 3/9/2024     CHIEF COMPLAINT: Fall    HISTORY OF PRESENT ILLNESS  This is a 68 year oldmale who presented after fall at home.  Patient stated he was previously diagnosed with Guillain-Barré syndrome.  Since that time has had persistent weakness in his lower extremities.  Patient states he was working with physical therapy.  After returning home, he went to exercise in his home when he felt his legs become weak causing him to fall.  Patient stated he fell backwards.  Patient denied head trauma or loss of consciousness.  After the fall he had increased pain in left lower back.  Patient denied new numbness or tingling.  Patient denied loss of bowel or bladder control.  Patient reported pain was increased with even minimal movements.  He was attempted to be pain controlled in the ED, but was unable to ambulate.  Patient was recommended for admission for increased pain management and PT/OT.  At time of interview, patient otherwise denied chest pain, shortness of breath, abdominal pain, nausea vomit, fever or chills.    PAST MEDICAL HISTORY  Past Medical History:   Diagnosis Date    PIERCE (acute kidney injury) (HCC) 2021    Arthritis     CAD (coronary artery disease)     High cholesterol     Neuropathy     Retinal pigmentation     Spine fracture     L3, L4, L5.  Rods placed    Thyroid disease     Ulcer     Unspecified essential hypertension         PAST SURGICAL HISTORY  Past Surgical History:   Procedure Laterality Date    ANGIOPLASTY (CORONARY)      with stent placement x 1    BACK SURGERY      EXCIS JT LINING,PROX I-P JT,EACH Right 10/14/2015    Procedure: ARTHROTOMY OF FINGER JOINT INTERPHALANGEAL;  Surgeon: Olvin Vences MD;  Location: [facility]  [facility]    EXCIS JT LINING,PROX I-P JT,EACH Right 10/14/2015    Procedure: ARTHROTOMY OF FINGER JOINT INTERPHALANGEAL;  Surgeon: Olvin Vences MD;  Location: [facility]    OTHER SURGICAL HISTORY  2000,2002, 2004, 2006    back surgery     OTHER SURGICAL HISTORY  1982    \"ulcer surgery\"    OTHER SURGICAL HISTORY Right 10/14/15    right arthrotomy, debridement, synovectomy of the joints w/biopsy right index and long finger MP joint    PATIENT DOCUMENTED NOT TO HAVE EXPERIENCED ANY OF THE FOLLOWING EVENTS Right 10/14/2015    Procedure: ARTHROTOMY OF FINGER JOINT INTERPHALANGEAL;  Surgeon: Olvin Vences MD;  Location: [facility]    PATIENT WITH PREOPERATIVE ORDER FOR IV ANTIBIOTIC SURGICAL SITE INFECT Right 10/14/2015    Procedure: ARTHROTOMY OF FINGER JOINT INTERPHALANGEAL;  Surgeon: Olvin Vences MD;  Location: [facility]       ALLERGIES   Aspirin, Clavulanic acid, Losartan, Penicillins, Seafood, Iodine (topical), Pregabalin, and Seasonal    MEDICATIONS  Patient's Medications   New Prescriptions    No medications on file   Previous Medications    ALENDRONATE 70 MG ORAL TAB    Take 1 tablet (70 mg total) by mouth once a week. On SUNDAYS only    ALFUZOSIN ER 10 MG ORAL TABLET 24 HR    Take 1 tablet (10 mg total) by mouth daily.    BISOPROLOL FUMARATE (ZEBETA) 5 MG ORAL TAB    Take 1 tablet (5 mg total) by mouth in the morning and 1 tablet (5 mg total) before bedtime.    CLOPIDOGREL 75 MG ORAL TAB    Take 1 tablet (75 mg total) by mouth daily.    COENZYME Q10 (COQ10) 100 MG ORAL CAP    Take 100 mg by mouth in the morning and 100 mg before bedtime.    CYCLOBENZAPRINE 10 MG ORAL TAB    Take 1 tablet (10 mg total) by mouth 3 (three) times daily as needed for Muscle spasms.    ESOMEPRAZOLE MAGNESIUM 20 MG ORAL CAPSULE DELAYED RELEASE    Take 1 capsule (20 mg total) by mouth every morning before breakfast.    HYDRALAZINE 100 MG ORAL TAB    Take 1 tablet (100 mg total) by mouth every 8 (eight) hours.     HYDROCODONE-ACETAMINOPHEN 5-325 MG ORAL TAB    Take 1 tablet by mouth every 6 (six) hours as needed for Pain.    LEVOTHYROXINE SODIUM (SYNTHROID, LEVOTHROID) 25 MCG ORAL TAB    Take 1 tablet (25 mcg total) by mouth before breakfast.    NIFEDIPINE 60 MG ORAL TABLET 24 HR    Take 60 mg by mouth 2 (two) times a day.    ONDANSETRON 4 MG ORAL TABLET DISPERSIBLE    Take 1 tablet (4 mg total) by mouth every 8 (eight) hours as needed for Nausea.    PREDNISONE 10 MG ORAL TAB    Take 1 tablet (10 mg total) by mouth 2 (two) times daily.    PREGABALIN 75 MG ORAL CAP    Take 1 capsule (75 mg total) by mouth daily.    VITAMIN D3 1000 UNITS ORAL TAB    Take 1 tablet (1,000 Units total) by mouth daily.   Modified Medications    No medications on file   Discontinued Medications    No medications on file       SOCIAL HISTORY  Social History     Socioeconomic History    Marital status:    Tobacco Use    Smoking status: Never    Smokeless tobacco: Never   Vaping Use    Vaping Use: Never used   Substance and Sexual Activity    Alcohol use: No    Drug use: No       FAMILY HISTORY  Family History   Problem Relation Age of Onset    Hypertension Father     Cancer Father         prostate cancer    Hypertension Mother     Diabetes Sister        PHYSICAL EXAM  Vital signs:  BP (!) 209/65   Pulse 66   Temp 97.7 °F (36.5 °C) (Oral)   Resp 16   Wt 153 lb (69.4 kg)   SpO2 98%   BMI 25.46 kg/m²      GENERAL:  Awake and alert, in no acute distress.  HEART:  Regular rhythm.  Regular rate   LUNGS:  Air entry was good.  No crackles or wheezes   ABDOMEN: Soft and non-tender. MUSCULOSKELETAL: Decreased range of motion of left lower extremity due to pain.  EXTREMITIES: No edema appreciated  PSYCHIATRIC: Normal mood      IMAGING  CT SPINE LUMBAR (CPT=72131)    Result Date: 3/9/2024  CONCLUSION:  1. No fracture or dislocation. 2. Stable postoperative changes from a remote laminectomy, interbody fusion and posterior fusion at L3-S1 including  posterior instrumented fusion at L3-L4.  No evidence of hardware failure. 3. Bilateral neural foraminal narrowing at L3-L4 and L4-L5. 4. Residual spondylotic changes at L1-L2 result in mild central vertebral canal stenosis and bilateral neural foraminal stenosis. 5. Stable mild spondylotic changes in the upper lumbar spine.     Dictated by (CST): Krzysztof Myles MD on 3/09/2024 at 6:16 PM     Finalized by (CST): Krzysztof Myles MD on 3/09/2024 at 6:22 PM          XR HUMERUS (MIN 2 VIEWS), LEFT (CPT=73060)    Result Date: 3/9/2024  CONCLUSION:  1. No fracture dislocation. 2. Mild left acromioclavicular and ulnotrochlear joint osteoarthritis.    Dictated by (CST): Krzysztof Myles MD on 3/09/2024 at 6:10 PM     Finalized by (CST): Krzysztof Myles MD on 3/09/2024 at 6:11 PM          XR LUMBAR SPINE (MIN 2 VIEWS) (CPT=72100)    Result Date: 3/9/2024  CONCLUSION:  1. No fracture or dislocation. 2. Stable postoperative changes from a remote laminectomy and posterior fusion at L3-S1.  No evidence of hardware failure. 3. Stable mild spondylotic changes in the upper lumbar spine.    Dictated by (CST): Krzysztof Myles MD on 3/09/2024 at 4:13 PM     Finalized by (CST): Krzysztof Myles MD on 3/09/2024 at 4:16 PM           Data:  No results for input(s): \"RBC\", \"HGB\", \"HCT\", \"MCV\", \"MCH\", \"MCHC\", \"RDW\", \"NEPRELIM\", \"WBC\", \"PLT\" in the last 168 hours.  No results for input(s): \"GLU\", \"BUN\", \"CREATSERUM\", \"GFRAA\", \"GFRNAA\", \"CA\", \"ALB\", \"NA\", \"K\", \"CL\", \"CO2\", \"ALKPHO\", \"AST\", \"ALT\", \"BILT\", \"TP\" in the last 168 hours.    ASSESSMENT/PLAN      Acute left-sided low back pain with left-sided sciatica  -Likely 2/2 to fall  -Pt with hx of weakness from GBS  -Imaging without signs of acute fx or malpositioning   -Pain control as able  -PT/OT eval    Accelerated HTN  -bp grossly elevated  -Start coreg, hydralazine, nifedipine  -PRN IV hydralazine  -Cont to monitor     Hypothyroid  -Restart home levothyroxine      Plan of  care discussed with patient and family at bedside.  Discussed with ED physician and RN. Decision made that pt needs hospitalization for further management/monitoring.      Tawanda Felix MD    This note was prepared using Dragon Medical voice recognition dictation software. As a result errors may occur. When identified these errors have been corrected. While every attempt is made to correct errors during dictation discrepancies may still exist      Electronically signed by Tawanda Felix MD on 3/9/2024 10:04 PM              Consults - MD Consult Notes      Consults signed by Russell Leo MD at 3/11/2024  8:38 AM     Author: Russell Leo MD Service: Physical Medicine and Rehabilitation Author Type: Physician    Filed: 3/11/2024  8:38 AM Date of Service: 3/11/2024  8:33 AM Status: Signed    : Russell Leo MD (Physician)       [facility]  part of [facility]      Maximo Diggs [facility] Patient Status:  Inpatient    1955 MRN [id number]   Location [facility] 5SW/SE Attending Aixa Orr MD   Hosp Day # 1 PCP Lito Pedroza MD       CC: Self-care mobility deficits secondary recurrent falls, low back pain    History of Present Illness:    68-year-old gentleman with history of temporal arteritis, work-related back injury with multiple back surgeries presents to the hospital after recurrent falls.  He states he is generally does well however has had some left lower extremity weakness with sensation of a \"giving out\".  Present to the hospital with acute onset back pain.  Pain is starting to improve.  He has been able to better participate with therapies and we are asked to see the best way to approach his rehabilitation would be.  Of note patient is unable to have an MRI due to having a stimulator placed in the back which he states is for his temporal arteritis.  Patient is limited historian.      Medications Prior to Admission   Medication Sig Dispense Refill Last  Dose    hydrALAZINE 100 MG Oral Tab Take 1 tablet (100 mg total) by mouth every 8 (eight) hours. 90 tablet 1 3/9/2024    HYDROcodone-acetaminophen 5-325 MG Oral Tab Take 1 tablet by mouth every 6 (six) hours as needed for Pain. 20 tablet 0 3/9/2024    alfuzosin ER 10 MG Oral Tablet 24 Hr Take 1 tablet (10 mg total) by mouth daily.   3/9/2024    predniSONE 10 MG Oral Tab Take 2 tablets (20 mg total) by mouth 2 (two) times daily.   3/9/2024    Esomeprazole Magnesium 20 MG Oral Capsule Delayed Release Take 1 capsule (20 mg total) by mouth every morning before breakfast.   3/9/2024    alendronate 70 MG Oral Tab Take 1 tablet (70 mg total) by mouth once a week. On SUNDAYS only   Past Week    cyclobenzaprine 10 MG Oral Tab Take 1 tablet (10 mg total) by mouth 3 (three) times daily as needed for Muscle spasms.   3/9/2024    NIFEdipine 60 MG Oral Tablet 24 Hr Take 1 tablet (60 mg total) by mouth in the morning and 1 tablet (60 mg total) before bedtime.   3/9/2024    Vitamin D3 1000 units Oral Tab Take 1 tablet (1,000 Units total) by mouth daily.   3/9/2024    Coenzyme Q10 (COQ10) 100 MG Oral Cap Take 100 mg by mouth in the morning and 100 mg before bedtime.   3/9/2024    Bisoprolol Fumarate (ZEBETA) 5 MG Oral Tab Take 1 tablet (5 mg total) by mouth in the morning and 1 tablet (5 mg total) before bedtime.   3/9/2024    Levothyroxine Sodium (SYNTHROID, LEVOTHROID) 25 MCG Oral Tab Take 1 tablet (25 mcg total) by mouth before breakfast.   3/9/2024    clopidogrel 75 MG Oral Tab Take 1 tablet (75 mg total) by mouth daily.   3/9/2024    [] lidocaine 5 % External Patch Place 1 patch onto the skin daily for 10 days. 10 patch 0     ondansetron 4 MG Oral Tablet Dispersible Take 1 tablet (4 mg total) by mouth every 8 (eight) hours as needed for Nausea. 6 tablet 0 More than a month    pregabalin 75 MG Oral Cap Take 1 capsule (75 mg total) by mouth daily. 15 capsule 1      Allergies   Allergen Reactions    Aspirin NAUSEA AND  VOMITING    Clavulanic Acid HIVES    Losartan MYALGIA    Penicillins HIVES    Seafood ANAPHYLAXIS    Iodine (Topical) UNKNOWN    Pregabalin OTHER (SEE COMMENTS)    Seasonal      Past Medical History:   Diagnosis Date    PIERCE (acute kidney injury) ([facility]) 9/23/2021    Arthritis     CAD (coronary artery disease)     High cholesterol     Neuropathy     Retinal pigmentation     Spine fracture 1998    L3, L4, L5.  Rods placed    Thyroid disease     Ulcer     Unspecified essential hypertension      Past Surgical History:   Procedure Laterality Date    ANGIOPLASTY (CORONARY)  2012    with stent placement x 1    BACK SURGERY      EXCIS JT LINING,PROX I-P JT,EACH Right 10/14/2015    Procedure: ARTHROTOMY OF FINGER JOINT INTERPHALANGEAL;  Surgeon: Olvin Vences MD;  Location: [facility]    EXCIS JT LINING,PROX I-P JT,EACH Right 10/14/2015    Procedure: ARTHROTOMY OF FINGER JOINT INTERPHALANGEAL;  Surgeon: Olvin Vences MD;  Location: [facility]    OTHER SURGICAL HISTORY  2000,2002, 2004, 2006    back surgery     OTHER SURGICAL HISTORY  1982    \"ulcer surgery\"    OTHER SURGICAL HISTORY Right 10/14/15    right arthrotomy, debridement, synovectomy of the joints w/biopsy right index and long finger MP joint    PATIENT DOCUMENTED NOT TO HAVE EXPERIENCED ANY OF THE FOLLOWING EVENTS Right 10/14/2015    Procedure: ARTHROTOMY OF FINGER JOINT INTERPHALANGEAL;  Surgeon: Olvin Vences MD;  Location: [facility]    PATIENT WITH PREOPERATIVE ORDER FOR IV ANTIBIOTIC SURGICAL SITE INFECT Right 10/14/2015    Procedure: ARTHROTOMY OF FINGER JOINT INTERPHALANGEAL;  Surgeon: Olvin Vences MD;  Location: [facility]     Social History     Socioeconomic History    Marital status:      Spouse name: Not on file    Number of children: Not on file    Years of education: Not on file    Highest education level: Not on file   Occupational History    Not on file   Tobacco Use    Smoking status: Never    Smokeless tobacco: Never    Vaping Use    Vaping Use: Never used   Substance and Sexual Activity    Alcohol use: No    Drug use: No    Sexual activity: Not on file   Other Topics Concern    Not on file   Social History Narrative    Not on file     Social Determinants of Health     Financial Resource Strain: Not on file   Food Insecurity: No Food Insecurity (3/9/2024)    Food Insecurity     Food Insecurity: Never true   Transportation Needs: No Transportation Needs (3/9/2024)    Transportation Needs     Lack of Transportation: No   Physical Activity: Not on file   Stress: Not on file   Social Connections: Not on file   Housing Stability: Low Risk  (3/9/2024)    Housing Stability     Housing Instability: No     Housing Instability Emergency: Not on file     Family History   Problem Relation Age of Onset    Hypertension Father     Cancer Father         prostate cancer    Hypertension Mother     Diabetes Sister        PAIN SCALE: 2    ACP: full code    Review of Systems  Pertinent items are noted in HPI.    Physical Exam  Temp:  [98 °F (36.7 °C)-98.3 °F (36.8 °C)] 98 °F (36.7 °C)  Pulse:  [74-84] 74  Resp:  [16-18] 16  BP: (138-170)/(50-61) 170/61  SpO2:  [97 %-98 %] 97 %  97%    I/O last 3 completed shifts:  In: 920 [P.O.:920]  Out: 1525 [Urine:1525]  No intake/output data recorded.       CVS-S1-S2 RRR no edema by extremities  Extremities-warm to touch, peripheral pulses are palpable  Lymph nodes-no palpable lymph nodes in neck or groin  MSK-bilateral upper extremity strength 5 out of 5 throughout.  Left lower extremity 4+ out of 5 when compared to the right which is 5 out of 5  Neurological exam cranial 2-12 are to be intact sensation intact to light touch and joint proprioception bilateral lower extremities    Previous Function: Mod I with a rolling walker, lives with his wife in a two-story home.  Drives    CURRENT FUNCTION:  AM-PAC Score:  Raw Score: 17   Approx Degree of Impairment: 50.57%   Standardized Score (AM-PAC Scale): 42.13   CMS  Modifier (G-Code): CK     FUNCTIONAL ABILITY STATUS  Functional Mobility/Gait Assessment  Gait Assistance: Minimum assistance  Distance (ft): 3 feet x 1, 30 feet x 1  Assistive Device: Rolling walker  Pattern:  (decreased magdaleno, decreased step length, forward flexed posture, narrow base of support, verbal cues for sequencing and rolling walker management.)  Rolling: contact guard assist  Supine to Sit: contact guard assist  Sit to Supine:  not tested  Sit to Stand: minimal assist    Labs  Lab Results   Component Value Date    WBC 13.7 (H) 03/10/2024    HGB 10.6 (L) 03/10/2024    HCT 32.1 (L) 03/10/2024    MCV 81.9 03/10/2024    .0 03/10/2024     No results found for: \"PTT\"  No results found for: \"PROTIME\"  Lab Results   Component Value Date    GLUCOSE 102 (H) 09/23/2014     03/10/2024    K 4.3 03/10/2024    CO2 22.0 03/10/2024     03/10/2024    BUN 27 (H) 03/10/2024       Radiology:  CT SPINE LUMBAR (CPT=72131)    Result Date: 3/9/2024  PROCEDURE: CT SPINE LUMBAR (CPT=72131)  COMPARISON: [facility], XR LUMBAR SPINE (MIN 2 VIEWS) (CPT=72100), 3/09/2024, 3:49 PM.  [facility], XR LUMBAR SPINE (MIN 2 VIEWS) (CPT=72100), 2/25/2024, 7:34 AM.  [facility], CT ABDOMEN PELVIS IV CONTRAST NO ORAL (ER), 11/15/2021, 3:20 PM.  INDICATIONS: Lower back/tailbone pain post fall a few days ago.  TECHNIQUE: Multidetector CT images of the lumbar spine were obtained without the infusion of non-ionic intravenous contrast material. Automated exposure control for dose reduction was used. Adjustment of the mA and/or kV was done based on the patient's size. Iterative reconstruction technique for dose reduction was employed. Dose information was transmitted to the ACR (American College of Radiology) NRDR (National Radiology Data Registry), which includes the Dose Index Registry.   FINDINGS: ALIGNMENT: The lumbar lordosis is preserved.  The lumbar spine is well aligned.  BONES: The osseous structures are demineralized.  No fractures or osseous lesions are apparent.  Postoperative changes are again noted from a remote laminectomy and posterior fusion at L3-S1 including transpedicular screws and vertical interconnecting rods bilaterally at L3-L4.  The hardware is intact and in stable customary positioning.  Mature bone chip formation is again noted at L2-L3 through L5-S1.  There is stable mild sacroiliac joint osteoarthritis bilaterally. DISC LEVELS: Intervertebral disc spacers are again seen at L3-L4, L4-L5 and L5-S1, which are in stable customary positioning.  There is stable mild disc space narrowing at L1-L2 with endplate spurring and vacuum phenomenon.  There is a mild generalized circumferential disc bulge at L1-L2 with moderate bilateral facet joint hypertrophy and ligamentum flavum laxity.  Mild central vertebral canal stenosis and bilateral neural foraminal stenosis is seen at this level.  There is bilateral neural foraminal narrowing at L3-L4 and L4-L5. PARASPINAL AREA: No visible mass.  OTHER: There is no visible swelling of the prevertebral soft tissues.  Atherosclerotic calcifications are again noted throughout the abdominal aorta and bilateral common iliac arteries.  A stent is again seen at the origin of the celiac artery.  Surgical  clips are again noted about the gastroesophageal junction.         CONCLUSION:  1. No fracture or dislocation. 2. Stable postoperative changes from a remote laminectomy, interbody fusion and posterior fusion at L3-S1 including posterior instrumented fusion at L3-L4.  No evidence of hardware failure. 3. Bilateral neural foraminal narrowing at L3-L4 and L4-L5. 4. Residual spondylotic changes at L1-L2 result in mild central vertebral canal stenosis and bilateral neural foraminal stenosis. 5. Stable mild spondylotic changes in the upper lumbar spine.     Dictated by (CST): Krzysztof Myles MD on 3/09/2024 at 6:16 PM     Finalized by (CST):  Krzysztof Myles MD on 3/09/2024 at 6:22 PM          XR HUMERUS (MIN 2 VIEWS), LEFT (CPT=73060)    Result Date: 3/9/2024  PROCEDURE: XR HUMERUS (MIN 2 VIEWS), LEFT (CPT=73060)  COMPARISON: None.  INDICATIONS: Left arm paresthesias.  TECHNIQUE: 2 views were obtained.   FINDINGS:  BONES: There is no fracture or dislocation.  The glenohumeral joint is intact.  There is mild left acromioclavicular joint osteoarthritis.  There is mild spurring involving the ulnotrochlear articulation.  The bones and joint spaces are otherwise intact. SOFT TISSUES: Mild atherosclerotic calcifications are seen in the left axilla. No visible soft tissue swelling. EFFUSION: None visible. OTHER: Negative.         CONCLUSION:  1. No fracture dislocation. 2. Mild left acromioclavicular and ulnotrochlear joint osteoarthritis.    Dictated by (CST): Krzysztof Myles MD on 3/09/2024 at 6:10 PM     Finalized by (CST): Krzysztof Myles MD on 3/09/2024 at 6:11 PM          XR LUMBAR SPINE (MIN 2 VIEWS) (CPT=72100)    Result Date: 3/9/2024  PROCEDURE: XR LUMBAR SPINE (MIN 2 VIEWS) (CPT=72100)  COMPARISON: [facility], XR LUMBAR SPINE (MIN 2 VIEWS) (CPT=72100), 2/25/2024, 7:34 AM.  INDICATIONS: Low back pain post fall few day ago, tailbone pain.  TECHNIQUE: Lumbar spine radiographs (2-3 views)   FINDINGS:   ALIGNMENT: Normal alignment.  VERTEBRAL BODIES:   There are 5 lumbar type vertebrae.  Vertebral body heights are maintained.  Postoperative changes are again noted from a remote laminectomy and posterior fusion at L3-S1 including transpedicular screws and vertical interconnecting rods bilaterally at L3-L4.  The hardware is intact and in stable customary positioning.  Mature bone chip formation is again seen at L4-S1. DISC SPACES: Intervertebral disc spacers at L3-L4, L4-L5 and L5-S1 are in stable customary positioning.  There is stable mild disc space narrowing at L1-L2 with endplate sclerosis/spurring. SACROILIAC JOINTS: Stable  mild osteoarthritis bilaterally. OTHER: Atherosclerotic calcifications are again seen throughout the abdominal aorta.  A metallic vascular stent is again noted in the epigastric region.  Pelvic phleboliths are again noted bilaterally.  There is a partially imaged spinal cord stimulator device.         CONCLUSION:  1. No fracture or dislocation. 2. Stable postoperative changes from a remote laminectomy and posterior fusion at L3-S1.  No evidence of hardware failure. 3. Stable mild spondylotic changes in the upper lumbar spine.    Dictated by (CST): Krzysztof Myles MD on 3/09/2024 at 4:13 PM     Finalized by (CST): Krzysztof Myles MD on 3/09/2024 at 4:16 PM          CT BRAIN OR HEAD (65840)    Result Date: 2/25/2024  PROCEDURE: CT BRAIN OR HEAD (CPT=70450)  COMPARISON: [facility], CT BRAIN OR HEAD (CPT=70450), 9/23/2021, 12:21 PM.  INDICATIONS: LLE weakness, fall.  TECHNIQUE: CT images were obtained without contrast material.  Automated exposure control for dose reduction was used.  Dose information is transmitted to the ACR (American College of Radiology) NRDR (National Radiology Data Registry) which includes the Dose Index Registry.  FINDINGS:  No skull fracture or scalp hematoma  Normal midline ventricles  No hemorrhage or mass effect or edema         CONCLUSION: No acute finding    Dictated by (CST): Ken Chapa MD on 2/25/2024 at 10:21 AM     Finalized by (CST): Ken Chapa MD on 2/25/2024 at 10:22 AM          XR LUMBAR SPINE (MIN 2 VIEWS) (CPT=72100)    Result Date: 2/25/2024  PROCEDURE: XR LUMBAR SPINE (MIN 2 VIEWS) (CPT=72100)  COMPARISON: [facility], CT ABDOMEN PELVIS IV CONTRAST NO ORAL (ER), 11/15/2021, 3:20 PM.  INDICATIONS: Patient states back pain and lateral left hip pain post accidental fall x 1 day ago.  TECHNIQUE: Lumbar spine radiographs (2-3 views)   FINDINGS:   ALIGNMENT: Normal alignment.  VERTEBRAL BODIES:   Posterior postsurgical instrumented fusion  L3-L4 noted.  Mild chronic appearing T12 and L1 compression deformities.  Minimal compression deformity L2 also likely chronic.  Otherwise no acute fracture or malalignment.  Multilevel marginal osteophyte formation and facet hypertrophy. DISC SPACES: Disc spaces are seen at L4-L5 and L5-S1. SACROILIAC JOINTS: Mild bilateral degenerative change. OTHER: Partially imaged probable spinal stimulator.  Atherosclerotic vascular calcification including aortic calcification.         CONCLUSION:   No acute fracture or malalignment.  Mild chronic appearing compression deformities T12, L1 and L2.  Postsurgical change lumbar spine as above with moderate lumbar spine degenerative change.    Dictated by (CST): Lokesh Boland MD on 2/25/2024 at 8:26 AM     Finalized by (CST): Lokesh Boland MD on 2/25/2024 at 8:30 AM          XR HIP W OR WO PELVIS 2 OR 3 VIEWS, LEFT (CPT=73502)    Result Date: 2/25/2024  PROCEDURE: XR HIP W OR WO PELVIS 2 OR 3 VIEWS, LEFT (CPT=73502)  COMPARISON: None.  INDICATIONS: Patient states back pain and lateral left hip pain post accidental fall x 1 day ago.  TECHNIQUE: 3 views were obtained.   FINDINGS:  BONES: No acute fracture or dislocation.  Mild degenerative change left hip.  Mild degenerative change elsewhere in the pelvis.  Evidence of postsurgical change in the lower lumbar spine and upper sacrum including posterior instrumented fusion. SOFT TISSUES: Negative. No visible soft tissue swelling. EFFUSION: None visible. OTHER: Vascular calcification.         CONCLUSION:   No acute fracture or dislocation.  Mild left hip osteoarthritis.    Dictated by (CST): Lokesh Boland MD on 2/25/2024 at 8:25 AM     Finalized by (CST): Lokesh Boland MD on 2/25/2024 at 8:26 AM            Assessment    Patient Active Problem List   Diagnosis    ESR raised    Cramps, extremity    Enthesopathy of wrist and carpus    Calcium pyrophosphate arthropathy    Stiffness of finger joint, right    SX/GLOBAL/   /[facility]/ARTHROTOMY DEBRIDEMENT AND BIOPSY FOR CRYSTALS RIGHT INDEX AND LONG FINGERS MP / DOS 10-14-15 / EXP 1-12-16    Chondrocostal junction syndrome (tietze)    Anterior chest wall pain    Trigger finger, right middle finger    Complex regional pain syndrome type 1 of left upper extremity    Left arm pain    PIERCE (acute kidney injury) ([facility])    Acute nonintractable headache, unspecified headache type    Strain of lumbar region, initial encounter    Weakness of both lower extremities    Contusion of left hip, initial encounter    GBS (Guillain Cashmere syndrome) ([facility])    Resistant hypertension    Acute left-sided low back pain with left-sided sciatica    Pain of left humerus       Plan     Continue PT OT   BP being monitored as per primary service  Patient is on baseline steroids 20 mg  Function patient is doing well should be able to be discharged home, if unable to do so then recommend subacute rehab.  Patient's preference is to go to [facility]    Thank you this consultation allowing participate in this patient's care        LAURIE LEO MD    3/11/2024    8:34 AM      Electronically signed by Laurie Leo MD on 3/11/2024  8:38 AM           D/C Summary    No notes of this type exist for this encounter.     Physical Therapy Notes (last 72 hours)  Notes from 3/10/2024  3:00 PM through 3/13/2024  3:00 PM   No notes of this type exist for this encounter.        Occupational Therapy Notes (last 72 hours)      Occupational Therapy Note signed by Davi Moody OT at 3/11/2024  1:44 PM  Version 1 of 1    Author: Davi Moody OT Service: Rehab Author Type: Occupational Therapist    Filed: 3/11/2024  1:44 PM Date of Service: 3/11/2024  8:27 AM Status: Signed    : Davi Moody OT (Occupational Therapist)       OCCUPATIONAL THERAPY EVALUATION - INPATIENT     Room Number: 536/536-A  Evaluation Date: 3/11/2024  Type of Evaluation: Initial  Presenting Problem: sciatica    Physician Order: IP Consult to  Occupational Therapy  Reason for Therapy: ADL/IADL Dysfunction and Discharge Planning    OCCUPATIONAL THERAPY ASSESSMENT   Patient is a 68 year old male admitted 3/9/2024 for left sided low back pain and falls.  Prior to admission, patient's baseline is very active at baseline but has experienced increased pain and higher frequency of falls leading up to admission.  Patient is currently functioning below baseline with self care, transfers, and functional mobility.  Patient is requiring up to Min A as a result of the following impairments: pain, activity tolerance, weakness, balance, mobility. Occupational Therapy will continue to follow for duration of hospitalization.    Patient will benefit from continued skilled OT Services to promote return to prior level of function and safety with continuous assistance and gradual rehabilitative therapy     PLAN  OT Treatment Plan: Balance activities;Energy conservation/work simplification techniques;ADL training;Functional transfer training;Endurance training;Patient/Family training;Patient/Family education;Compensatory technique education  OT Device Recommendations: TBD    OCCUPATIONAL THERAPY MEDICAL/SOCIAL HISTORY   Problem List   Principal Problem:    Acute left-sided low back pain with left-sided sciatica  Active Problems:    Pain of left humerus    HOME SITUATION  Type of Home: House  Home Layout: Able to live on main level  Lives With: Spouse  Drives: No  Patient Regularly Uses: Glasses      SUBJECTIVE  I want to go to rehab     OCCUPATIONAL THERAPY EXAMINATION   OBJECTIVE  Fall Risk: Standard fall risk    WEIGHT BEARING RESTRICTION     PAIN ASSESSMENT  Ratin    ACTIVITY TOLERANCE  Pulse: 76        BP: 157/88             O2 SATURATIONS  Oxygen Therapy  SPO2% on Room Air at Rest: 97    COGNITION  Overall Cognitive Status:  WFL - within functional limits    RANGE OF MOTION   Upper extremity ROM is within functional limits     STRENGTH ASSESSMENT  Upper extremity  strength is within functional limits     ACTIVITIES OF DAILY LIVING ASSESSMENT  AM-PAC ‘6-Clicks’ Inpatient Daily Activity Short Form  How much help from another person does the patient currently need…  -   Putting on and taking off regular lower body clothing?: A Little  -   Bathing (including washing, rinsing, drying)?: A Little  -   Toileting, which includes using toilet, bedpan or urinal? : A Little  -   Putting on and taking off regular upper body clothing?: A Little  -   Taking care of personal grooming such as brushing teeth?: A Little  -   Eating meals?: A Little    AM-PAC Score:  Score: 18  Approx Degree of Impairment: 46.65%  Standardized Score (AM-PAC Scale): 38.66  CMS Modifier (G-Code): CK    FUNCTIONAL TRANSFER ASSESSMENT- Min A from chair; CGA from toilet     BED MOBILITY: SBA     BALANCE ASSESSMENT: CGA-Min A standing; SbA/SPV for seated     FUNCTIONAL ADL ASSESSMENT    Eating: Independent  Grooming: setup  Bathing: Min A  LE dressing: Min A  UE dressing: Setup  Toileting: CGA        Skilled Therapy Provided: Activity promotion, ADL retraining, energy conservation; left up in chair at end of session    EDUCATION PROVIDED  Patient: Role of Occupational Therapy; Plan of Care; Discharge Recommendations; Functional Transfer Techniques; Posture/Positioning; Compensatory ADL Techniques  Patient's Response to Education: Verbalized Understanding    The patient's Approx Degree of Impairment: 46.65% has been calculated based on documentation in the [facility] '6 clicks' Inpatient Daily Activity Short Form.  Research supports that patients with this level of impairment may benefit from HH, however, pt is below baseline and with high frequency of falls is at risk for further deconditioning; may benefit from GR to maximize function prior to return home.    .  Final disposition will be made by interdisciplinary medical team.    Patient End of Session: Up in chair;Needs met;Call light within reach;RN aware of  session/findings;All patient questions and concerns addressed    OT Goals  Patient self-stated goal is: to go to rehab     Patient will complete LE dressing with Kasia   Comment:     Patient will complete toilet transfer with Mod I   Comment:     Patient will complete self care task at sink level with Kasia    Comment:     Comment:         Goals  on:3/25  Frequency:3x week    Patient Evaluation Complexity Level:   Occupational Profile/Medical History low   Specific performance deficits impacting engagement in ADL/IADL low   Client Assessment/Performance Deficits low   Clinical Decision Making low   Overall Complexity Low      OT Session Time: 15 minutes  Self-Care Home Management: 15 minutes    Davi Moody Occupational Therapist, OTR/L ext [phone]                  Video Swallow Study Notes    No notes of this type exist for this encounter.     SLP Notes    No notes of this type exist for this encounter.     Immunizations     Name Date      Celestone Soluspan 3mg  17     Celestone Soluspan 3mg  06/04/15     Celestone Soluspan 3mg  04/02/15     Covid-19 Pfizer 21     Covid-19 Pfizer 21     Covid-19 Pfizer 21     Covid-19 Pfizer Bivalent 22     Methylprednisolone 80 mg 16       Multidisciplinary Problems     Active Goals        Problem: Patient/Family Goals    Goal Priority Disciplines Outcome Interventions   Patient/Family Long Term Goal     Interdisciplinary Progressing    Description: Patient's Long Term Goal: Go home    Interventions:  - Diagnostic testing  - Follow MD orders  - See additional Care Plan goals for specific interventions   Patient/Family Short Term Goal     Interdisciplinary Progressing    Description: Patient's Short Term Goal: Decreased pain    Interventions:   - Pain management  - PT/OT  - Follow MD orders  - See additional Care Plan goals for specific interventions

## (undated) NOTE — LETTER
March 14, 2024     Maximo Richardserson .  [address] [facility] [address] [facility] [address]      Dear Maximo:    Below are the results from your recent visit:    Resulted Orders   Myasthenia Gravis Reflex Panel   Result Value Ref Range    AChR Binding Abs <0.03 0.00 - 0.24 nmol/L    AChR Blocking Abs 20 0 - 25 %    AChR-modulating Ab 0 0 - 45 %    Reflex Information Comment     Striational Antibodies Negative Neg:<1:100    Narrative    Test(s) 963315-RMuZ Blocking Abs, Serum  This test was developed and its performance characteristics  determined by [facility]. It has not been cleared or approved  by the Food and Drug Administration.  Test(s) 561431-EHnZ-calzvqoxvq Ab  was developed and its performance characteristics determined  by [facility]. It has not been cleared or approved by the Food  and Drug Administration.  Test(s) 857742-Jthnxsjze Abs, Serum  was developed and its performance characteristics determined  by [facility]. It has not been cleared or approved by the Food  and Drug Administration.  Performed at:  01 - [facility]  [id number]  : Kennedi Dyer MD, Phone:  [phone]   MuSK Antibodies   Result Value Ref Range    MuSK Abs, Serum <1.0 U/mL    Narrative    Performed at:  02 - [facility]  [address] [place] [address]  [id number]  : Wicho Herrera MD, Phone:  [phone]     The test results show that your current  myasthenia gravis antibodies checked in the hospital were negative.    If you have any questions or concerns, please don't hesitate to call.     Loretta Orr, DO   Neurology